# Patient Record
Sex: FEMALE | Race: WHITE | Employment: OTHER | URBAN - METROPOLITAN AREA
[De-identification: names, ages, dates, MRNs, and addresses within clinical notes are randomized per-mention and may not be internally consistent; named-entity substitution may affect disease eponyms.]

---

## 2019-05-08 ENCOUNTER — HOSPITAL ENCOUNTER (INPATIENT)
Age: 69
LOS: 7 days | Discharge: HOME HEALTH | DRG: 330 | End: 2019-05-15
Attending: EMERGENCY MEDICINE | Admitting: FAMILY MEDICINE
Payer: MEDICARE

## 2019-05-08 ENCOUNTER — APPOINTMENT (OUTPATIENT)
Dept: CT IMAGING | Age: 69
DRG: 330 | End: 2019-05-08
Payer: MEDICARE

## 2019-05-08 ENCOUNTER — APPOINTMENT (OUTPATIENT)
Dept: GENERAL RADIOLOGY | Age: 69
DRG: 330 | End: 2019-05-08
Payer: MEDICARE

## 2019-05-08 DIAGNOSIS — K56.609 LARGE BOWEL OBSTRUCTION (HCC): Primary | ICD-10-CM

## 2019-05-08 PROBLEM — R79.89 ELEVATED LACTIC ACID LEVEL: Status: ACTIVE | Noted: 2019-05-08

## 2019-05-08 LAB
ALBUMIN SERPL-MCNC: 4.2 GM/DL (ref 3.4–5)
ALP BLD-CCNC: 77 IU/L (ref 40–129)
ALT SERPL-CCNC: 17 U/L (ref 10–40)
ANION GAP SERPL CALCULATED.3IONS-SCNC: 15 MMOL/L (ref 4–16)
AST SERPL-CCNC: 17 IU/L (ref 15–37)
BASOPHILS ABSOLUTE: 0 K/CU MM
BASOPHILS RELATIVE PERCENT: 0.4 % (ref 0–1)
BILIRUB SERPL-MCNC: 0.5 MG/DL (ref 0–1)
BUN BLDV-MCNC: 10 MG/DL (ref 6–23)
CALCIUM SERPL-MCNC: 10.1 MG/DL (ref 8.3–10.6)
CHLORIDE BLD-SCNC: 101 MMOL/L (ref 99–110)
CO2: 22 MMOL/L (ref 21–32)
CREAT SERPL-MCNC: 0.7 MG/DL (ref 0.6–1.1)
DIFFERENTIAL TYPE: ABNORMAL
EOSINOPHILS ABSOLUTE: 0 K/CU MM
EOSINOPHILS RELATIVE PERCENT: 0.3 % (ref 0–3)
GFR AFRICAN AMERICAN: >60 ML/MIN/1.73M2
GFR NON-AFRICAN AMERICAN: >60 ML/MIN/1.73M2
GLUCOSE BLD-MCNC: 129 MG/DL (ref 70–99)
HCT VFR BLD CALC: 45.6 % (ref 37–47)
HEMOGLOBIN: 13.6 GM/DL (ref 12.5–16)
IMMATURE NEUTROPHIL %: 0.4 % (ref 0–0.43)
LACTATE: 2 MMOL/L (ref 0.4–2)
LACTATE: ABNORMAL MMOL/L (ref 0.4–2)
LACTIC ACID, SEPSIS: 0.9 MMOL/L (ref 0.5–1.9)
LIPASE: 27 IU/L (ref 13–60)
LYMPHOCYTES ABSOLUTE: 1.8 K/CU MM
LYMPHOCYTES RELATIVE PERCENT: 15.8 % (ref 24–44)
MCH RBC QN AUTO: 25.8 PG (ref 27–31)
MCHC RBC AUTO-ENTMCNC: 29.8 % (ref 32–36)
MCV RBC AUTO: 86.5 FL (ref 78–100)
MONOCYTES ABSOLUTE: 0.5 K/CU MM
MONOCYTES RELATIVE PERCENT: 4.2 % (ref 0–4)
NUCLEATED RBC %: 0 %
PDW BLD-RTO: 15.7 % (ref 11.7–14.9)
PLATELET # BLD: 475 K/CU MM (ref 140–440)
PMV BLD AUTO: 9.5 FL (ref 7.5–11.1)
POTASSIUM SERPL-SCNC: 4 MMOL/L (ref 3.5–5.1)
RBC # BLD: 5.27 M/CU MM (ref 4.2–5.4)
SEGMENTED NEUTROPHILS ABSOLUTE COUNT: 8.9 K/CU MM
SEGMENTED NEUTROPHILS RELATIVE PERCENT: 78.9 % (ref 36–66)
SODIUM BLD-SCNC: 138 MMOL/L (ref 135–145)
TOTAL IMMATURE NEUTOROPHIL: 0.04 K/CU MM
TOTAL NUCLEATED RBC: 0 K/CU MM
TOTAL PROTEIN: 8.1 GM/DL (ref 6.4–8.2)
WBC # BLD: 11.3 K/CU MM (ref 4–10.5)

## 2019-05-08 PROCEDURE — 80053 COMPREHEN METABOLIC PANEL: CPT

## 2019-05-08 PROCEDURE — 85025 COMPLETE CBC W/AUTO DIFF WBC: CPT

## 2019-05-08 PROCEDURE — 74177 CT ABD & PELVIS W/CONTRAST: CPT

## 2019-05-08 PROCEDURE — 96375 TX/PRO/DX INJ NEW DRUG ADDON: CPT

## 2019-05-08 PROCEDURE — 93005 ELECTROCARDIOGRAM TRACING: CPT | Performed by: EMERGENCY MEDICINE

## 2019-05-08 PROCEDURE — 6370000000 HC RX 637 (ALT 250 FOR IP): Performed by: NURSE PRACTITIONER

## 2019-05-08 PROCEDURE — 1200000000 HC SEMI PRIVATE

## 2019-05-08 PROCEDURE — 6360000002 HC RX W HCPCS: Performed by: SURGERY

## 2019-05-08 PROCEDURE — 74018 RADEX ABDOMEN 1 VIEW: CPT

## 2019-05-08 PROCEDURE — 6360000004 HC RX CONTRAST MEDICATION: Performed by: PHYSICIAN ASSISTANT

## 2019-05-08 PROCEDURE — 94761 N-INVAS EAR/PLS OXIMETRY MLT: CPT

## 2019-05-08 PROCEDURE — 83690 ASSAY OF LIPASE: CPT

## 2019-05-08 PROCEDURE — 2580000003 HC RX 258: Performed by: NURSE PRACTITIONER

## 2019-05-08 PROCEDURE — 2580000003 HC RX 258: Performed by: PHYSICIAN ASSISTANT

## 2019-05-08 PROCEDURE — 2580000003 HC RX 258: Performed by: SURGERY

## 2019-05-08 PROCEDURE — 96376 TX/PRO/DX INJ SAME DRUG ADON: CPT

## 2019-05-08 PROCEDURE — 36415 COLL VENOUS BLD VENIPUNCTURE: CPT

## 2019-05-08 PROCEDURE — 83605 ASSAY OF LACTIC ACID: CPT

## 2019-05-08 PROCEDURE — 6360000002 HC RX W HCPCS: Performed by: PHYSICIAN ASSISTANT

## 2019-05-08 PROCEDURE — 96365 THER/PROPH/DIAG IV INF INIT: CPT

## 2019-05-08 PROCEDURE — 99285 EMERGENCY DEPT VISIT HI MDM: CPT

## 2019-05-08 PROCEDURE — 96361 HYDRATE IV INFUSION ADD-ON: CPT

## 2019-05-08 RX ORDER — KETOROLAC TROMETHAMINE 30 MG/ML
15 INJECTION, SOLUTION INTRAMUSCULAR; INTRAVENOUS EVERY 6 HOURS PRN
Status: DISPENSED | OUTPATIENT
Start: 2019-05-08 | End: 2019-05-13

## 2019-05-08 RX ORDER — HYDROCODONE BITARTRATE AND ACETAMINOPHEN 5; 325 MG/1; MG/1
1 TABLET ORAL EVERY 6 HOURS PRN
Status: ON HOLD | COMMUNITY
End: 2019-05-15 | Stop reason: HOSPADM

## 2019-05-08 RX ORDER — SODIUM CHLORIDE 0.9 % (FLUSH) 0.9 %
10 SYRINGE (ML) INJECTION PRN
Status: DISCONTINUED | OUTPATIENT
Start: 2019-05-08 | End: 2019-05-15 | Stop reason: HOSPADM

## 2019-05-08 RX ORDER — ATORVASTATIN CALCIUM 10 MG/1
10 TABLET, FILM COATED ORAL DAILY
COMMUNITY

## 2019-05-08 RX ORDER — 0.9 % SODIUM CHLORIDE 0.9 %
1000 INTRAVENOUS SOLUTION INTRAVENOUS ONCE
Status: COMPLETED | OUTPATIENT
Start: 2019-05-08 | End: 2019-05-08

## 2019-05-08 RX ORDER — SODIUM CHLORIDE 9 MG/ML
INJECTION, SOLUTION INTRAVENOUS CONTINUOUS
Status: DISCONTINUED | OUTPATIENT
Start: 2019-05-08 | End: 2019-05-09

## 2019-05-08 RX ORDER — FAMOTIDINE 20 MG/1
20 TABLET, FILM COATED ORAL 2 TIMES DAILY
Status: DISCONTINUED | OUTPATIENT
Start: 2019-05-08 | End: 2019-05-09

## 2019-05-08 RX ORDER — ONDANSETRON 2 MG/ML
4 INJECTION INTRAMUSCULAR; INTRAVENOUS EVERY 6 HOURS PRN
Status: DISCONTINUED | OUTPATIENT
Start: 2019-05-08 | End: 2019-05-15 | Stop reason: HOSPADM

## 2019-05-08 RX ORDER — ROPINIROLE 1 MG/1
2 TABLET, FILM COATED ORAL 2 TIMES DAILY
Status: DISCONTINUED | OUTPATIENT
Start: 2019-05-08 | End: 2019-05-15 | Stop reason: HOSPADM

## 2019-05-08 RX ORDER — OXYMETAZOLINE HYDROCHLORIDE 0.05 G/100ML
2 SPRAY NASAL ONCE
Status: ACTIVE | OUTPATIENT
Start: 2019-05-08 | End: 2019-05-11

## 2019-05-08 RX ORDER — LIDOCAINE HYDROCHLORIDE 20 MG/ML
JELLY TOPICAL ONCE
Status: DISCONTINUED | OUTPATIENT
Start: 2019-05-08 | End: 2019-05-15 | Stop reason: HOSPADM

## 2019-05-08 RX ORDER — ROPINIROLE 2 MG/1
2 TABLET, FILM COATED ORAL 2 TIMES DAILY
COMMUNITY
End: 2021-12-17

## 2019-05-08 RX ORDER — ONDANSETRON 2 MG/ML
4 INJECTION INTRAMUSCULAR; INTRAVENOUS EVERY 30 MIN PRN
Status: DISCONTINUED | OUTPATIENT
Start: 2019-05-08 | End: 2019-05-13

## 2019-05-08 RX ORDER — SODIUM CHLORIDE 0.9 % (FLUSH) 0.9 %
10 SYRINGE (ML) INJECTION EVERY 12 HOURS SCHEDULED
Status: DISCONTINUED | OUTPATIENT
Start: 2019-05-08 | End: 2019-05-15 | Stop reason: HOSPADM

## 2019-05-08 RX ORDER — MORPHINE SULFATE 4 MG/ML
4 INJECTION, SOLUTION INTRAMUSCULAR; INTRAVENOUS EVERY 30 MIN PRN
Status: DISCONTINUED | OUTPATIENT
Start: 2019-05-08 | End: 2019-05-09

## 2019-05-08 RX ORDER — AMOXICILLIN 875 MG/1
875 TABLET, COATED ORAL 2 TIMES DAILY
Status: ON HOLD | COMMUNITY
End: 2019-05-15 | Stop reason: HOSPADM

## 2019-05-08 RX ORDER — PROMETHAZINE HYDROCHLORIDE 25 MG/1
12.5 TABLET ORAL EVERY 6 HOURS PRN
Status: DISCONTINUED | OUTPATIENT
Start: 2019-05-08 | End: 2019-05-09

## 2019-05-08 RX ADMIN — PIPERACILLIN SODIUM AND TAZOBACTAM SODIUM 3.38 G: 3; .375 INJECTION, POWDER, FOR SOLUTION INTRAVENOUS at 18:29

## 2019-05-08 RX ADMIN — FAMOTIDINE 20 MG: 20 TABLET ORAL at 23:27

## 2019-05-08 RX ADMIN — PIPERACILLIN SODIUM AND TAZOBACTAM SODIUM 3.38 G: 3; .375 INJECTION, POWDER, FOR SOLUTION INTRAVENOUS at 23:26

## 2019-05-08 RX ADMIN — MORPHINE SULFATE 4 MG: 4 INJECTION INTRAVENOUS at 14:02

## 2019-05-08 RX ADMIN — MORPHINE SULFATE 4 MG: 4 INJECTION INTRAVENOUS at 18:20

## 2019-05-08 RX ADMIN — SODIUM CHLORIDE, PRESERVATIVE FREE 10 ML: 5 INJECTION INTRAVENOUS at 14:57

## 2019-05-08 RX ADMIN — ONDANSETRON 4 MG: 2 INJECTION INTRAMUSCULAR; INTRAVENOUS at 14:02

## 2019-05-08 RX ADMIN — SODIUM CHLORIDE, PRESERVATIVE FREE 10 ML: 5 INJECTION INTRAVENOUS at 23:27

## 2019-05-08 RX ADMIN — SODIUM CHLORIDE: 9 INJECTION, SOLUTION INTRAVENOUS at 23:26

## 2019-05-08 RX ADMIN — SODIUM CHLORIDE 1000 ML: 9 INJECTION, SOLUTION INTRAVENOUS at 14:02

## 2019-05-08 RX ADMIN — ONDANSETRON 4 MG: 2 INJECTION INTRAMUSCULAR; INTRAVENOUS at 18:20

## 2019-05-08 RX ADMIN — IOPAMIDOL 75 ML: 755 INJECTION, SOLUTION INTRAVENOUS at 14:57

## 2019-05-08 ASSESSMENT — PAIN SCALES - GENERAL
PAINLEVEL_OUTOF10: 8
PAINLEVEL_OUTOF10: 5
PAINLEVEL_OUTOF10: 0

## 2019-05-08 ASSESSMENT — PAIN DESCRIPTION - PAIN TYPE
TYPE: ACUTE PAIN
TYPE: ACUTE PAIN

## 2019-05-08 ASSESSMENT — PAIN DESCRIPTION - LOCATION
LOCATION: ABDOMEN
LOCATION: ABDOMEN

## 2019-05-08 ASSESSMENT — PAIN DESCRIPTION - DESCRIPTORS: DESCRIPTORS: SORE;PRESSURE

## 2019-05-08 ASSESSMENT — PAIN DESCRIPTION - ORIENTATION: ORIENTATION: MID;LOWER

## 2019-05-08 ASSESSMENT — PAIN DESCRIPTION - FREQUENCY: FREQUENCY: INTERMITTENT

## 2019-05-08 NOTE — ED PROVIDER NOTES
Patient Identification  Fam Scott is a 80 y.o. female    Chief Complaint  Abdominal Pain (patient states she is having a diverticulitis flare; NV)      HPI  (History provided by patient)  This is a 80 y.o. female who was brought in by self for chief complaint of abdominal pain, NV. Onset was 3 months ago. Pain is located from the ribs to the vagina throughout the abdomen. She describes it as constant since onset. States it is severe, worse over the last few days. She has a history of diverticulitis and states this feels similar. She has been diagnosed with diverticulitis twice in the last 2 months, states that symptoms never resolved, she has been on Cipro and Flagyl, also completed course of Augmentin without relief. She has never seen a surgeon for this. She notes recurrent issues of with diverticulitis for several years. Reports subjective fevers and chills at home but no documented elevated temperature. She reports vomiting but no changes in bowel movements, last bowel movement was 4 AM today, no bloody stools. REVIEW OF SYSTEMS    Constitutional:  + subjective fever, chills  HENT:  Denies sore throat or ear pain   Eyes: Denies vision changes, eye pain  Cardiovascular:  Denies chest pain, syncope  Respiratory:  Denies shortness of breath, cough   GI:  + abdominal pain, nausea, vomiting  :  Denies dysuria, discharge  Musculoskeletal:  Denies back pain, joint pain  Skin:  Denies rash, pruritis  Neurologic:  Denies headache, focal weakness, or sensory changes     See HPI and nursing notes for additional information     I have reviewed the following nursing documentation:  Allergies: No Known Allergies    Past medical history:  has a past medical history of Diverticulitis, Endometriosis, and Restless leg syndrome. Past surgical history:  has no past surgical history on file.     Home medications:   Prior to Admission medications    Not on File       Social history:  reports that she has quit smoking. She has never used smokeless tobacco. She reports that she does not use drugs. Family history:    Family History   Problem Relation Age of Onset    Dementia Mother     Cancer Father     Lung Cancer Sister     Colon Cancer Sister          Exam  /84   Pulse 96   Temp 97.5 °F (36.4 °C) (Oral)   Resp 16   Ht 5' 9\" (1.753 m)   Wt 192 lb (87.1 kg)   SpO2 99%   BMI 28.35 kg/m²   Nursing note and vitals reviewed. Constitutional: Well developed, well nourished. No acute distress. HENT:      Head: Normocephalic and atraumatic. Ears: External ears normal.      Nose: Nose normal.     Mouth: Membrane mucosa moist and pink. No posterior oropharynx erythema or tonsillar edema  Eyes: Anicteric sclera. No discharge, PERRL  Neck: Supple. Trachea midline. Cardiovascular: RRR, no murmurs, rubs, or gallops, radial pulses 2+ bilaterally. Pulmonary/Chest: Effort normal. No respiratory distress. CTAB. No stridor. No wheezes. No rales. Abdominal: Soft. TTP in suprapubic region, less so in LLQ. No distension. No guarding, rebound tenderness, or evidence of ascites. : No CVA tenderness. Musculoskeletal: Moves all extremities. No gross deformity. Neurological: Alert and oriented to person, place, and time. Normal muscle tone. Skin: Warm and dry. No rash. Psychiatric: Normal mood and affect. Behavior is normal.      Radiographs (if obtained):  [] The following radiograph was interpreted by myself in the absence of a radiologist:   [x] Radiologist's Report Reviewed:  CT ABDOMEN PELVIS W IV CONTRAST   Final Result   Findings compatible with distal large bowel obstruction with transition in   proximal sigmoid colon and upstream dilatation of remainder of large bowel as   well as of small bowel loops to the level of the proximal jejunum. More   distal sigmoid colon is collapsed.   Underlying wall thickening not excluded   which could reflect nonspecific infectious or inflammatory colitis. Underlying mass lesion not excluded. Recommend continued close follow-up and   consideration for direct visualization. Superimposed mild colonic   diverticulosis but without a particularly inflamed diverticulum to suggest   diverticulitis. Prominent appendix without periappendiceal inflammatory change or wall   thickening, felt unlikely to represent acute appendicitis. Mild ascites, likely reactive. Large hiatal hernia. Indeterminate low-attenuation right renal lesion measuring 1.7 x 1.6 cm. Recommend further evaluation with renal mass protocol MRI or CT on a   nonemergent basis.                 Labs  Results for orders placed or performed during the hospital encounter of 05/08/19   CBC auto diff   Result Value Ref Range    WBC 11.3 (H) 4.0 - 10.5 K/CU MM    RBC 5.27 4.2 - 5.4 M/CU MM    Hemoglobin 13.6 12.5 - 16.0 GM/DL    Hematocrit 45.6 37 - 47 %    MCV 86.5 78 - 100 FL    MCH 25.8 (L) 27 - 31 PG    MCHC 29.8 (L) 32.0 - 36.0 %    RDW 15.7 (H) 11.7 - 14.9 %    Platelets 258 (H) 307 - 440 K/CU MM    MPV 9.5 7.5 - 11.1 FL    Differential Type AUTOMATED DIFFERENTIAL     Segs Relative 78.9 (H) 36 - 66 %    Lymphocytes % 15.8 (L) 24 - 44 %    Monocytes % 4.2 (H) 0 - 4 %    Eosinophils % 0.3 0 - 3 %    Basophils % 0.4 0 - 1 %    Segs Absolute 8.9 K/CU MM    Lymphocytes # 1.8 K/CU MM    Monocytes # 0.5 K/CU MM    Eosinophils # 0.0 K/CU MM    Basophils # 0.0 K/CU MM    Nucleated RBC % 0.0 %    Total Nucleated RBC 0.0 K/CU MM    Total Immature Neutrophil 0.04 K/CU MM    Immature Neutrophil % 0.4 0 - 0.43 %   CMP   Result Value Ref Range    Sodium 138 135 - 145 MMOL/L    Potassium 4.0 3.5 - 5.1 MMOL/L    Chloride 101 99 - 110 mMol/L    CO2 22 21 - 32 MMOL/L    BUN 10 6 - 23 MG/DL    CREATININE 0.7 0.6 - 1.1 MG/DL    Glucose 129 (H) 70 - 99 MG/DL    Calcium 10.1 8.3 - 10.6 MG/DL    Alb 4.2 3.4 - 5.0 GM/DL    Total Protein 8.1 6.4 - 8.2 GM/DL    Total Bilirubin 0.5 0.0 - 1.0 MG/DL    ALT 17 10 - 40 U/L    AST 17 15 - 37 IU/L    Alkaline Phosphatase 77 40 - 129 IU/L    GFR Non-African American >60 >60 mL/min/1.73m2    GFR African American >60 >60 mL/min/1.73m2    Anion Gap 15 4 - 16   Lipase   Result Value Ref Range    Lipase 27 13 - 60 IU/L   Lactic Acid, Plasma   Result Value Ref Range    Lactate (HH) 0.4 - 2.0 mMOL/L     2.4  LACT CALLED TO BELA BHARDWAJ ON 05/08/2019 @ 1419 BY SHEN GALVEZ   RESULTS READ BACK     Lactic Acid, Plasma   Result Value Ref Range    Lactate 2.0 0.4 - 2.0 mMOL/L   EKG 12 Lead   Result Value Ref Range    Ventricular Rate 87 BPM    Atrial Rate 87 BPM    P-R Interval 142 ms    QRS Duration 74 ms    Q-T Interval 370 ms    QTc Calculation (Bazett) 445 ms    P Axis 36 degrees    R Axis 54 degrees    T Axis 50 degrees    Diagnosis       Sinus rhythm with fusion complexes  Otherwise normal ECG  No previous ECGs available           MDM  Patient presents for abdominal pain, vomiting. She reports she's been having difficulty passing flatulence. She is concerned may be due to diverticulitis and has been on multiple antibiotics recently for this. Found to have a large bowel obstruction area initial lactic acid was slightly elevated, repeat after fluids is normal.  NG tube placed. Pain is improved with morphine and Zofran. Discussed all results with patient. Discussed that I will need to contact his general surgeon regarding her symptoms. Consult was placed to Dr. Denisse Briggs at patient's request, discussed history and physical examination and imaging, he will assess today, likely surgery tomorrow afternoon. Consult placed to LORENZO Packer who will admit. This patient was also seen and evaluated by Dr. Drew Johnson. Final Impression  1. Large bowel obstruction (HCC)        Blood pressure 121/84, pulse 96, temperature 97.5 °F (36.4 °C), temperature source Oral, resp. rate 16, height 5' 9\" (1.753 m), weight 192 lb (87.1 kg), SpO2 99 %.      Disposition:  Admit to med/surg floor in stable condition. Patient was given scripts for the following medications. I counseled patient how to take these medications. New Prescriptions    No medications on file       This chart was generated using the [address] [place] [address] [address]  dictation system. I created this record but it may contain dictation errors given the limitations of this technology.        Marcel Mcghee PA-C  05/08/19 9602

## 2019-05-08 NOTE — ED PROVIDER NOTES
I independently examined and evaluated Angela Meek. In brief their history revealed a 80 y.o. female who was brought in by self for chief complaint of abdominal pain, NV. Onset was 3 months ago. Pain is located from the ribs to the vagina throughout the abdomen. She describes it as constant since onset. States it is severe, worse over the last few days. She has a history of diverticulitis and states this feels similar. She has been diagnosed with diverticulitis twice in the last 2 months, states that symptoms never resolved, she has been on Cipro and Flagyl, also completed course of Augmentin without relief. She has never seen a surgeon for this. She notes recurrent issues of with diverticulitis for several years. Reports subjective fevers and chills at home but no documented elevated temperature. She reports vomiting but no changes in bowel movements, last bowel movement was 4 AM today, no bloody stools.     Their focused exam revealed alert and oriented female resting in bed in no distress normocephalic atraumatic sclerae clear airway normal lungs clear heart regular rhythm 2 pulse pulses throughout abdomen soft tender diffusely no rebound or guarding or rigidity. Sounds decreased. 5/5 strength throughout skin has no rash or swelling cranial nerves intact     ED course: Patient seen with PA please see his note. Patient here with abdominal pain nausea vomiting constipation found to have a large bowel obstruction general surgery consulted, NG tube inserted patient admitted for observation given pain medicine nausea medicine. 12 lead EKG per my interpretation:  Normal Sinus Rhythm 86  Axis is   Normal  QTc is  440  There is no specific T wave changes appreciated. There is no specific ST wave changes appreciated.     Prior EKG to compare with was not available       All diagnostic, treatment, and disposition decisions were made by myself in conjunction with the [facility]

## 2019-05-08 NOTE — H&P
History and Physical  FABIEN Ram-BC   Internal Medicine Hospitalist        Name:  Marsa Peabody /Age/Sex: 1950  (80 y.o. female)   MRN & CSN:  [id number] & [id number] Admission Date/Time: 2019  1:19 PM   Location:  [facility]01 PCP: No primary care provider on file. Hospital Day: 1      Supervising Physician: Dr. Abdelrahman Beasley    Chief Complaint: Abdominal Pain (patient states she is having a diverticulitis flare; NV)     Assessment and Plan:   Marsa Peabody is a 80 y.o.  female who presents with Large bowel obstruction ([address]     Acute Large Bowel obstruction - could be from adhesions, history of hemorrhoids surgery in . No recent colonoscopy or EGD, CT abdomen findings compatible with distal large bowel obstruction. - admit inpatient, telemetry monitoring           - Consult Gen surgery, Dr. Ryan Sue, possible surgery tomorrow afternoon         - ABx: Zosyn per Dr. Ryan Sue         - strict NPO effective now         - IVF NS         - NG tube placed in ED         - pain control: PRN toradol         - prn Zofran         - check lab works in AM     Elevated lactic acid level - could be 2/2 fluid depletion, lactate 2.4         - cont IVF hydration         - series lactate     Chronic Illnesses: will continue current home medications unless contraindicated by above plan and assessment. - Diverticulitis         - Restless leg syndrome     Current diagnosis and plan of management discussed with the patient at the time of admission in lay language who agree(s) to the above plan and disposition of admission for further care. All concerns and questions addressed. Patient assessment and plan in conjunction with supervising physician - Dr. Alfred Seo Effective Now    DVT Prophylaxis [] Lovenox, []  Heparin, [x] SCDs, [] Ambulation  [] Long term AC  Hold Lovenox for now d/t possible surgery tomorrow afternoon.    GI Prophylaxis [x] PPI,  [] father; Genette Zurdo in her sister; Dementia in her mother; Corinne Stevens in her sister. Soc HX:   Social History     Socioeconomic History    Marital status:      Spouse name: None    Number of children: None    Years of education: None    Highest education level: None   Occupational History    None   Social Needs    Financial resource strain: None    Food insecurity:     Worry: None     Inability: None    Transportation needs:     Medical: None     Non-medical: None   Tobacco Use    Smoking status: Former Smoker    Smokeless tobacco: Never Used   Substance and Sexual Activity    Alcohol use: None    Drug use: Never    Sexual activity: None   Lifestyle    Physical activity:     Days per week: None     Minutes per session: None    Stress: None   Relationships    Social connections:     Talks on phone: None     Gets together: None     Attends Synagogue service: None     Active member of club or organization: None     Attends meetings of clubs or organizations: None     Relationship status: None    Intimate partner violence:     Fear of current or ex partner: None     Emotionally abused: None     Physically abused: None     Forced sexual activity: None   Other Topics Concern    None   Social History Narrative    None     TOBACCO:   reports that she has quit smoking. She has never used smokeless tobacco.  ETOH:   has no alcohol history on file. Drugs:  reports that she does not use drugs.     Allergies: No Known Allergies  Medications:   Medications:    oxymetazoline  2 spray Nasal Once    lidocaine   Topical Once    sodium chloride flush  10 mL Intravenous 2 times per day    famotidine  20 mg Oral BID    piperacillin-tazobactam  3.375 g Intravenous Once    [START ON 5/9/2019] piperacillin-tazobactam  3.375 g Intravenous Q8H      Infusions:   PRN Meds:     morphine 4 mg Q30 Min PRN   ondansetron 4 mg Q30 Min PRN   sodium chloride flush 10 mL PRN   sodium chloride flush 10 mL PRN magnesium hydroxide 30 mL Daily PRN   ondansetron 4 mg Q6H PRN   ketorolac 15 mg Q6H PRN     Prior to Admission Meds:  Prior to Admission medications    Medication Sig Start Date End Date Taking? Authorizing Provider   HYDROcodone-acetaminophen (NORCO) 5-325 MG per tablet Take 1 tablet by mouth every 6 hours as needed for Pain. Yes Historical Provider, MD   rOPINIRole (REQUIP) 2 MG tablet Take 2 mg by mouth 2 times daily   Yes Historical Provider, MD   amoxicillin (AMOXIL) 875 MG tablet Take 875 mg by mouth 2 times daily   Yes Historical Provider, MD   atorvastatin (LIPITOR) 10 MG tablet Take 10 mg by mouth daily   Yes Historical Provider, MD   triamcinolone (KENALOG) 0.1 % ointment Apply topically 2 times daily Apply topically 2 times daily. Yes Historical Provider, MD     Data:     Laboratory this visit:  Reviewed  Recent Labs     05/08/19  1230   WBC 11.3*   HGB 13.6   HCT 45.6   *      Recent Labs     05/08/19  1230      K 4.0      CO2 22   BUN 10   CREATININE 0.7     Recent Labs     05/08/19  1230   AST 17   ALT 17   BILITOT 0.5   ALKPHOS 77     No results for input(s): INR in the last 72 hours. No results for input(s): CKTOTAL, CKMB, CKMBINDEX in the last 72 hours. Invalid input(s): Nikkie Duran input(s): PRO-BNP    Radiology this visit:  Reviewed. Ct Abdomen Pelvis W Iv Contrast    Result Date: 5/8/2019  EXAMINATION: CT OF THE ABDOMEN AND PELVIS WITH CONTRAST 5/8/2019 2:52 pm TECHNIQUE: CT of the abdomen and pelvis was performed with the administration of intravenous contrast. Multiplanar reformatted images are provided for review. Dose modulation, iterative reconstruction, and/or weight based adjustment of the mA/kV was utilized to reduce the radiation dose to as low as reasonably achievable. COMPARISON: None HISTORY: ORDERING SYSTEM PROVIDED HISTORY: ABDOMINAL PAIN TECHNOLOGIST PROVIDED HISTORY: IV contrast only. Thank you.  Ordering Physician Provided Reason for Exam: Abdominal pain; Diverticulitis Acuity: Unknown Type of Exam: Unknown Additional signs and symptoms: PT STATES PAIN X A MONTH Relevant Medical/Surgical History: 75 ML [address]OVUE 370 FINDINGS: Lower Chest: Visualized lung bases appear clear. No pleural effusions. Large hiatal hernia. Organs: Benign liver cyst versus hemangioma to the left lobe of the liver measuring 8 mm in size. No follow-up imaging recommended. Liver otherwise appears unremarkable. Spleen, pancreas, gallbladder and adrenal glands are unremarkable. Bilobed low-attenuation partially exophytic focus to the upper lobe of the left kidney with attenuation characteristics in the range of fluid compatible with benign renal cyst measuring 2.0 x 1.0 cm. There are also two smaller low-attenuation foci noted to the lower pole of the left kidney which are too small to further characterize but compatible with benign renal cysts as well. No follow up imaging of any of these foci recommended. Left kidney is otherwise unremarkable. There is a low-attenuation focus noted to the upper pole of the right kidney with attenuation characteristics higher than fluid. This measures 1.7 x 1.6 cm. Right kidney otherwise appears unremarkable. GI/Bowel: Fluid and air-filled distention of multiple small bowel loops involving jejunum through terminal ileum. There is also fluid in air-filled distention of large bowel to level of proximal sigmoid colon. Distal to this the sigmoid colon is collapsed. Underlying wall thickening not excluded. There may be a few scattered colonic diverticula but no diverticula appeared to be particularly inflamed. Appendix is prominent measuring up to 9 mm in caliber but without periappendiceal inflammatory change or wall thickening of the appendix. Pelvis: Urinary bladder, uterus and adnexa appear unremarkable. Peritoneum/Retroperitoneum: Small amount of intraperitoneal free fluid. No focal fluid collections or intraperitoneal free air. Abdominal aorta is normal in caliber. Mild atherosclerosis. No lymphadenopathy is seen. Bones/Soft Tissues: No acute or suspicious bony or soft tissue abnormalities. Findings compatible with distal large bowel obstruction with transition in proximal sigmoid colon and upstream dilatation of remainder of large bowel as well as of small bowel loops to the level of the jejunum. More distal sigmoid colon is collapsed. Underlying wall thickening not excluded which could reflect nonspecific infectious or inflammatory colitis. Underlying mass lesion not excluded. Recommend continued close follow-up and consideration for direct visualization. Superimposed mild colonic diverticulosis but without a particularly inflamed diverticulum to suggest diverticulitis. Prominent appendix without periappendiceal inflammatory change or wall thickening, felt unlikely to represent acute appendicitis. Mild ascites, likely reactive. Large hiatal hernia. Indeterminate low-attenuation right renal lesion measuring 1.7 x 1.6 cm. Recommend further evaluation with renal mass protocol MRI or CT on a nonemergent basis. EKG this visit:   EKG: Sinus rhythm with fusion complexes, rate 87. Otherwise normal ECG. No previous ECGs available. Current Treatment Team:  Treatment Team: Attending Provider: Erika Qiu MD; Consulting Physician: Joby Higgins MD; Registered Nurse: Roque Gama LPN; Patient Care Tech: Adam Piper; Registered Nurse: Annabelle Birmingham A. Marzella Kayser, RN Sharlyne Grimes, APRN-BC   [facility]  5/8/2019 4:18 PM      Electronically signed by FABIEN Pickard CNP on 5/8/2019 at 4:18 PM

## 2019-05-09 ENCOUNTER — ANESTHESIA EVENT (OUTPATIENT)
Dept: OPERATING ROOM | Age: 69
DRG: 330 | End: 2019-05-09
Payer: MEDICARE

## 2019-05-09 ENCOUNTER — ANESTHESIA (OUTPATIENT)
Dept: OPERATING ROOM | Age: 69
DRG: 330 | End: 2019-05-09
Payer: MEDICARE

## 2019-05-09 VITALS
RESPIRATION RATE: 4 BRPM | SYSTOLIC BLOOD PRESSURE: 138 MMHG | TEMPERATURE: 98 F | OXYGEN SATURATION: 100 % | DIASTOLIC BLOOD PRESSURE: 103 MMHG

## 2019-05-09 LAB
ANION GAP SERPL CALCULATED.3IONS-SCNC: 10 MMOL/L (ref 4–16)
BACTERIA: NEGATIVE /HPF
BASOPHILS ABSOLUTE: 0 K/CU MM
BASOPHILS RELATIVE PERCENT: 0.3 % (ref 0–1)
BILIRUBIN URINE: NEGATIVE MG/DL
BLOOD, URINE: NEGATIVE
BUN BLDV-MCNC: 10 MG/DL (ref 6–23)
CALCIUM SERPL-MCNC: 9.1 MG/DL (ref 8.3–10.6)
CHLORIDE BLD-SCNC: 104 MMOL/L (ref 99–110)
CLARITY: CLEAR
CO2: 26 MMOL/L (ref 21–32)
COLOR: YELLOW
CREAT SERPL-MCNC: 0.8 MG/DL (ref 0.6–1.1)
DIFFERENTIAL TYPE: ABNORMAL
EOSINOPHILS ABSOLUTE: 0.1 K/CU MM
EOSINOPHILS RELATIVE PERCENT: 1.1 % (ref 0–3)
GFR AFRICAN AMERICAN: >60 ML/MIN/1.73M2
GFR NON-AFRICAN AMERICAN: >60 ML/MIN/1.73M2
GLUCOSE BLD-MCNC: 116 MG/DL (ref 70–99)
GLUCOSE, URINE: NEGATIVE MG/DL
HCT VFR BLD CALC: 39.6 % (ref 37–47)
HEMOGLOBIN: 11.9 GM/DL (ref 12.5–16)
IMMATURE NEUTROPHIL %: 0.3 % (ref 0–0.43)
KETONES, URINE: NEGATIVE MG/DL
LEUKOCYTE ESTERASE, URINE: NEGATIVE
LYMPHOCYTES ABSOLUTE: 1 K/CU MM
LYMPHOCYTES RELATIVE PERCENT: 11.4 % (ref 24–44)
MCH RBC QN AUTO: 26 PG (ref 27–31)
MCHC RBC AUTO-ENTMCNC: 30.1 % (ref 32–36)
MCV RBC AUTO: 86.7 FL (ref 78–100)
MONOCYTES ABSOLUTE: 0.6 K/CU MM
MONOCYTES RELATIVE PERCENT: 6.6 % (ref 0–4)
NITRITE URINE, QUANTITATIVE: NEGATIVE
NUCLEATED RBC %: 0 %
PDW BLD-RTO: 15.9 % (ref 11.7–14.9)
PH, URINE: 5 (ref 5–8)
PLATELET # BLD: 362 K/CU MM (ref 140–440)
PMV BLD AUTO: 9.3 FL (ref 7.5–11.1)
POTASSIUM SERPL-SCNC: 4 MMOL/L (ref 3.5–5.1)
PROTEIN UA: NEGATIVE MG/DL
RBC # BLD: 4.57 M/CU MM (ref 4.2–5.4)
RBC URINE: 1 /HPF (ref 0–6)
SEGMENTED NEUTROPHILS ABSOLUTE COUNT: 7.3 K/CU MM
SEGMENTED NEUTROPHILS RELATIVE PERCENT: 80.3 % (ref 36–66)
SODIUM BLD-SCNC: 140 MMOL/L (ref 135–145)
SPECIFIC GRAVITY UA: 1.04 (ref 1–1.03)
SPECIFIC GRAVITY UA: ABNORMAL (ref 1–1.03)
SQUAMOUS EPITHELIAL: 4 /HPF
TOTAL IMMATURE NEUTOROPHIL: 0.03 K/CU MM
TOTAL NUCLEATED RBC: 0 K/CU MM
TRICHOMONAS: ABNORMAL /HPF
UROBILINOGEN, URINE: NORMAL MG/DL (ref 0.2–1)
WBC # BLD: 9.1 K/CU MM (ref 4–10.5)
WBC UA: 2 /HPF (ref 0–5)

## 2019-05-09 PROCEDURE — 7100000000 HC PACU RECOVERY - FIRST 15 MIN: Performed by: SURGERY

## 2019-05-09 PROCEDURE — 0D1N0Z4 BYPASS SIGMOID COLON TO CUTANEOUS, OPEN APPROACH: ICD-10-PCS | Performed by: SURGERY

## 2019-05-09 PROCEDURE — 6360000002 HC RX W HCPCS: Performed by: SURGERY

## 2019-05-09 PROCEDURE — 81001 URINALYSIS AUTO W/SCOPE: CPT

## 2019-05-09 PROCEDURE — 2580000003 HC RX 258: Performed by: NURSE PRACTITIONER

## 2019-05-09 PROCEDURE — 3700000001 HC ADD 15 MINUTES (ANESTHESIA): Performed by: SURGERY

## 2019-05-09 PROCEDURE — 2580000003 HC RX 258: Performed by: NURSE ANESTHETIST, CERTIFIED REGISTERED

## 2019-05-09 PROCEDURE — 6360000002 HC RX W HCPCS: Performed by: NURSE ANESTHETIST, CERTIFIED REGISTERED

## 2019-05-09 PROCEDURE — 2500000003 HC RX 250 WO HCPCS: Performed by: NURSE ANESTHETIST, CERTIFIED REGISTERED

## 2019-05-09 PROCEDURE — 6360000002 HC RX W HCPCS: Performed by: NURSE PRACTITIONER

## 2019-05-09 PROCEDURE — 6360000002 HC RX W HCPCS: Performed by: PHYSICIAN ASSISTANT

## 2019-05-09 PROCEDURE — 3600000004 HC SURGERY LEVEL 4 BASE: Performed by: SURGERY

## 2019-05-09 PROCEDURE — 0DBN0ZZ EXCISION OF SIGMOID COLON, OPEN APPROACH: ICD-10-PCS | Performed by: SURGERY

## 2019-05-09 PROCEDURE — 6360000002 HC RX W HCPCS: Performed by: ANESTHESIOLOGY

## 2019-05-09 PROCEDURE — 88304 TISSUE EXAM BY PATHOLOGIST: CPT

## 2019-05-09 PROCEDURE — 2580000003 HC RX 258: Performed by: SURGERY

## 2019-05-09 PROCEDURE — 7100000001 HC PACU RECOVERY - ADDTL 15 MIN: Performed by: SURGERY

## 2019-05-09 PROCEDURE — 85025 COMPLETE CBC W/AUTO DIFF WBC: CPT

## 2019-05-09 PROCEDURE — 6370000000 HC RX 637 (ALT 250 FOR IP): Performed by: NURSE PRACTITIONER

## 2019-05-09 PROCEDURE — 2720000010 HC SURG SUPPLY STERILE: Performed by: SURGERY

## 2019-05-09 PROCEDURE — C9113 INJ PANTOPRAZOLE SODIUM, VIA: HCPCS | Performed by: SURGERY

## 2019-05-09 PROCEDURE — 3700000000 HC ANESTHESIA ATTENDED CARE: Performed by: SURGERY

## 2019-05-09 PROCEDURE — 3600000014 HC SURGERY LEVEL 4 ADDTL 15MIN: Performed by: SURGERY

## 2019-05-09 PROCEDURE — 36415 COLL VENOUS BLD VENIPUNCTURE: CPT

## 2019-05-09 PROCEDURE — 93010 ELECTROCARDIOGRAM REPORT: CPT | Performed by: INTERNAL MEDICINE

## 2019-05-09 PROCEDURE — 6370000000 HC RX 637 (ALT 250 FOR IP): Performed by: SURGERY

## 2019-05-09 PROCEDURE — 1200000000 HC SEMI PRIVATE

## 2019-05-09 PROCEDURE — 80048 BASIC METABOLIC PNL TOTAL CA: CPT

## 2019-05-09 PROCEDURE — 2580000003 HC RX 258

## 2019-05-09 PROCEDURE — 2709999900 HC NON-CHARGEABLE SUPPLY: Performed by: SURGERY

## 2019-05-09 PROCEDURE — 88307 TISSUE EXAM BY PATHOLOGIST: CPT

## 2019-05-09 PROCEDURE — 0DTJ0ZZ RESECTION OF APPENDIX, OPEN APPROACH: ICD-10-PCS | Performed by: SURGERY

## 2019-05-09 RX ORDER — PROMETHAZINE HYDROCHLORIDE 25 MG/ML
6.25 INJECTION, SOLUTION INTRAMUSCULAR; INTRAVENOUS
Status: DISCONTINUED | OUTPATIENT
Start: 2019-05-09 | End: 2019-05-09 | Stop reason: HOSPADM

## 2019-05-09 RX ORDER — PANTOPRAZOLE SODIUM 40 MG/10ML
40 INJECTION, POWDER, LYOPHILIZED, FOR SOLUTION INTRAVENOUS DAILY
Status: DISCONTINUED | OUTPATIENT
Start: 2019-05-09 | End: 2019-05-15 | Stop reason: HOSPADM

## 2019-05-09 RX ORDER — LIDOCAINE HYDROCHLORIDE 20 MG/ML
INJECTION, SOLUTION INTRAVENOUS PRN
Status: DISCONTINUED | OUTPATIENT
Start: 2019-05-09 | End: 2019-05-09 | Stop reason: SDUPTHER

## 2019-05-09 RX ORDER — LORAZEPAM 2 MG/ML
1 INJECTION INTRAMUSCULAR EVERY 6 HOURS PRN
Status: DISCONTINUED | OUTPATIENT
Start: 2019-05-09 | End: 2019-05-15 | Stop reason: HOSPADM

## 2019-05-09 RX ORDER — SODIUM CHLORIDE 9 MG/ML
INJECTION, SOLUTION INTRAVENOUS CONTINUOUS PRN
Status: DISCONTINUED | OUTPATIENT
Start: 2019-05-09 | End: 2019-05-09 | Stop reason: SDUPTHER

## 2019-05-09 RX ORDER — PROMETHAZINE HYDROCHLORIDE 25 MG/ML
12.5 INJECTION, SOLUTION INTRAMUSCULAR; INTRAVENOUS EVERY 6 HOURS PRN
Status: DISCONTINUED | OUTPATIENT
Start: 2019-05-09 | End: 2019-05-15 | Stop reason: HOSPADM

## 2019-05-09 RX ORDER — HYDROMORPHONE HCL 110MG/55ML
PATIENT CONTROLLED ANALGESIA SYRINGE INTRAVENOUS PRN
Status: DISCONTINUED | OUTPATIENT
Start: 2019-05-09 | End: 2019-05-09 | Stop reason: SDUPTHER

## 2019-05-09 RX ORDER — DEXAMETHASONE SODIUM PHOSPHATE 4 MG/ML
INJECTION, SOLUTION INTRA-ARTICULAR; INTRALESIONAL; INTRAMUSCULAR; INTRAVENOUS; SOFT TISSUE PRN
Status: DISCONTINUED | OUTPATIENT
Start: 2019-05-09 | End: 2019-05-09 | Stop reason: SDUPTHER

## 2019-05-09 RX ORDER — ACETAMINOPHEN 10 MG/ML
1000 INJECTION, SOLUTION INTRAVENOUS ONCE
Status: COMPLETED | OUTPATIENT
Start: 2019-05-09 | End: 2019-05-09

## 2019-05-09 RX ORDER — NALOXONE HYDROCHLORIDE 0.4 MG/ML
0.4 INJECTION, SOLUTION INTRAMUSCULAR; INTRAVENOUS; SUBCUTANEOUS PRN
Status: DISCONTINUED | OUTPATIENT
Start: 2019-05-09 | End: 2019-05-14

## 2019-05-09 RX ORDER — DIPHENHYDRAMINE HYDROCHLORIDE 50 MG/ML
25 INJECTION INTRAMUSCULAR; INTRAVENOUS EVERY 6 HOURS PRN
Status: DISCONTINUED | OUTPATIENT
Start: 2019-05-09 | End: 2019-05-14

## 2019-05-09 RX ORDER — SODIUM CHLORIDE 9 MG/ML
INJECTION, SOLUTION INTRAVENOUS CONTINUOUS
Status: DISCONTINUED | OUTPATIENT
Start: 2019-05-09 | End: 2019-05-13

## 2019-05-09 RX ORDER — HYDRALAZINE HYDROCHLORIDE 20 MG/ML
5 INJECTION INTRAMUSCULAR; INTRAVENOUS EVERY 10 MIN PRN
Status: DISCONTINUED | OUTPATIENT
Start: 2019-05-09 | End: 2019-05-09 | Stop reason: HOSPADM

## 2019-05-09 RX ORDER — HYDROMORPHONE HCL 110MG/55ML
0.25 PATIENT CONTROLLED ANALGESIA SYRINGE INTRAVENOUS EVERY 5 MIN PRN
Status: DISCONTINUED | OUTPATIENT
Start: 2019-05-09 | End: 2019-05-09 | Stop reason: HOSPADM

## 2019-05-09 RX ORDER — ONDANSETRON 2 MG/ML
INJECTION INTRAMUSCULAR; INTRAVENOUS PRN
Status: DISCONTINUED | OUTPATIENT
Start: 2019-05-09 | End: 2019-05-09 | Stop reason: SDUPTHER

## 2019-05-09 RX ORDER — FENTANYL CITRATE 50 UG/ML
INJECTION, SOLUTION INTRAMUSCULAR; INTRAVENOUS PRN
Status: DISCONTINUED | OUTPATIENT
Start: 2019-05-09 | End: 2019-05-09 | Stop reason: SDUPTHER

## 2019-05-09 RX ORDER — ROCURONIUM BROMIDE 10 MG/ML
INJECTION, SOLUTION INTRAVENOUS PRN
Status: DISCONTINUED | OUTPATIENT
Start: 2019-05-09 | End: 2019-05-09 | Stop reason: SDUPTHER

## 2019-05-09 RX ORDER — FENTANYL CITRATE 50 UG/ML
25 INJECTION, SOLUTION INTRAMUSCULAR; INTRAVENOUS EVERY 5 MIN PRN
Status: DISCONTINUED | OUTPATIENT
Start: 2019-05-09 | End: 2019-05-09 | Stop reason: HOSPADM

## 2019-05-09 RX ORDER — HYDROMORPHONE HCL 110MG/55ML
0.5 PATIENT CONTROLLED ANALGESIA SYRINGE INTRAVENOUS EVERY 5 MIN PRN
Status: DISCONTINUED | OUTPATIENT
Start: 2019-05-09 | End: 2019-05-09 | Stop reason: HOSPADM

## 2019-05-09 RX ORDER — PROPOFOL 10 MG/ML
INJECTION, EMULSION INTRAVENOUS PRN
Status: DISCONTINUED | OUTPATIENT
Start: 2019-05-09 | End: 2019-05-09 | Stop reason: SDUPTHER

## 2019-05-09 RX ORDER — SODIUM CHLORIDE 9 MG/ML
INJECTION, SOLUTION INTRAVENOUS
Status: COMPLETED
Start: 2019-05-09 | End: 2019-05-09

## 2019-05-09 RX ORDER — MORPHINE SULFATE 2 MG/ML
2 INJECTION, SOLUTION INTRAMUSCULAR; INTRAVENOUS EVERY 5 MIN PRN
Status: DISCONTINUED | OUTPATIENT
Start: 2019-05-09 | End: 2019-05-09 | Stop reason: HOSPADM

## 2019-05-09 RX ORDER — LABETALOL HYDROCHLORIDE 5 MG/ML
5 INJECTION, SOLUTION INTRAVENOUS EVERY 10 MIN PRN
Status: DISCONTINUED | OUTPATIENT
Start: 2019-05-09 | End: 2019-05-09 | Stop reason: HOSPADM

## 2019-05-09 RX ADMIN — HYDROMORPHONE HYDROCHLORIDE 1 MG: 2 INJECTION INTRAMUSCULAR; INTRAVENOUS; SUBCUTANEOUS at 18:48

## 2019-05-09 RX ADMIN — ONDANSETRON 4 MG: 2 INJECTION INTRAMUSCULAR; INTRAVENOUS at 16:15

## 2019-05-09 RX ADMIN — ROPINIROLE HYDROCHLORIDE 2 MG: 1 TABLET, FILM COATED ORAL at 22:48

## 2019-05-09 RX ADMIN — SODIUM CHLORIDE: 9 INJECTION, SOLUTION INTRAVENOUS at 16:04

## 2019-05-09 RX ADMIN — DEXAMETHASONE SODIUM PHOSPHATE 8 MG: 4 INJECTION, SOLUTION INTRAMUSCULAR; INTRAVENOUS at 16:15

## 2019-05-09 RX ADMIN — FENTANYL CITRATE 25 MCG: 50 INJECTION, SOLUTION INTRAMUSCULAR; INTRAVENOUS at 19:38

## 2019-05-09 RX ADMIN — ACETAMINOPHEN 1000 MG: 10 INJECTION, SOLUTION INTRAVENOUS at 18:44

## 2019-05-09 RX ADMIN — PHENYLEPHRINE HYDROCHLORIDE 100 MCG: 10 INJECTION INTRAVENOUS at 16:24

## 2019-05-09 RX ADMIN — FENTANYL CITRATE 50 MCG: 50 INJECTION INTRAMUSCULAR; INTRAVENOUS at 16:39

## 2019-05-09 RX ADMIN — PIPERACILLIN SODIUM,TAZOBACTAM SODIUM 3.38 G: 3; .375 INJECTION, POWDER, FOR SOLUTION INTRAVENOUS at 10:16

## 2019-05-09 RX ADMIN — PROMETHAZINE HYDROCHLORIDE 12.5 MG: 25 TABLET ORAL at 00:26

## 2019-05-09 RX ADMIN — FENTANYL CITRATE 25 MCG: 50 INJECTION INTRAMUSCULAR; INTRAVENOUS at 18:42

## 2019-05-09 RX ADMIN — SODIUM CHLORIDE: 9 INJECTION, SOLUTION INTRAVENOUS at 10:07

## 2019-05-09 RX ADMIN — MORPHINE SULFATE 4 MG: 4 INJECTION INTRAVENOUS at 00:24

## 2019-05-09 RX ADMIN — FENTANYL CITRATE 50 MCG: 50 INJECTION INTRAMUSCULAR; INTRAVENOUS at 17:04

## 2019-05-09 RX ADMIN — PIPERACILLIN SODIUM,TAZOBACTAM SODIUM 3.38 G: 3; .375 INJECTION, POWDER, FOR SOLUTION INTRAVENOUS at 02:44

## 2019-05-09 RX ADMIN — Medication 12 MG: at 22:01

## 2019-05-09 RX ADMIN — SODIUM CHLORIDE: 9 INJECTION, SOLUTION INTRAVENOUS at 17:23

## 2019-05-09 RX ADMIN — ONDANSETRON 4 MG: 2 INJECTION INTRAMUSCULAR; INTRAVENOUS at 22:48

## 2019-05-09 RX ADMIN — FENTANYL CITRATE 50 MCG: 50 INJECTION INTRAMUSCULAR; INTRAVENOUS at 17:21

## 2019-05-09 RX ADMIN — FENTANYL CITRATE 25 MCG: 50 INJECTION INTRAMUSCULAR; INTRAVENOUS at 17:44

## 2019-05-09 RX ADMIN — PANTOPRAZOLE SODIUM 40 MG: 40 INJECTION, POWDER, LYOPHILIZED, FOR SOLUTION INTRAVENOUS at 19:34

## 2019-05-09 RX ADMIN — ROCURONIUM BROMIDE 50 MG: 50 INJECTION, SOLUTION INTRAVENOUS at 16:08

## 2019-05-09 RX ADMIN — ROCURONIUM BROMIDE 10 MG: 50 INJECTION, SOLUTION INTRAVENOUS at 17:18

## 2019-05-09 RX ADMIN — ONDANSETRON 4 MG: 2 INJECTION INTRAMUSCULAR; INTRAVENOUS at 10:06

## 2019-05-09 RX ADMIN — HYDROMORPHONE HYDROCHLORIDE 1 MG: 2 INJECTION INTRAMUSCULAR; INTRAVENOUS; SUBCUTANEOUS at 18:47

## 2019-05-09 RX ADMIN — FENTANYL CITRATE 100 MCG: 50 INJECTION INTRAMUSCULAR; INTRAVENOUS at 16:08

## 2019-05-09 RX ADMIN — FENTANYL CITRATE 25 MCG: 50 INJECTION, SOLUTION INTRAMUSCULAR; INTRAVENOUS at 19:44

## 2019-05-09 RX ADMIN — SODIUM CHLORIDE: 9 INJECTION, SOLUTION INTRAVENOUS at 19:27

## 2019-05-09 RX ADMIN — PROPOFOL 200 MG: 10 INJECTION, EMULSION INTRAVENOUS at 16:08

## 2019-05-09 RX ADMIN — LIDOCAINE HYDROCHLORIDE 100 MG: 20 INJECTION, SOLUTION INTRAVENOUS at 16:08

## 2019-05-09 RX ADMIN — KETOROLAC TROMETHAMINE 15 MG: 30 INJECTION, SOLUTION INTRAMUSCULAR at 10:21

## 2019-05-09 RX ADMIN — ROPINIROLE HYDROCHLORIDE 2 MG: 1 TABLET, FILM COATED ORAL at 00:25

## 2019-05-09 RX ADMIN — PIPERACILLIN SODIUM,TAZOBACTAM SODIUM 3.38 G: 3; .375 INJECTION, POWDER, FOR SOLUTION INTRAVENOUS at 18:22

## 2019-05-09 RX ADMIN — SUGAMMADEX 200 MG: 100 INJECTION, SOLUTION INTRAVENOUS at 18:43

## 2019-05-09 RX ADMIN — ROCURONIUM BROMIDE 20 MG: 50 INJECTION, SOLUTION INTRAVENOUS at 16:58

## 2019-05-09 ASSESSMENT — PULMONARY FUNCTION TESTS
PIF_VALUE: 18
PIF_VALUE: 18
PIF_VALUE: 4
PIF_VALUE: 16
PIF_VALUE: 18
PIF_VALUE: 16
PIF_VALUE: 17
PIF_VALUE: 16
PIF_VALUE: 16
PIF_VALUE: 18
PIF_VALUE: 16
PIF_VALUE: 17
PIF_VALUE: 17
PIF_VALUE: 18
PIF_VALUE: 17
PIF_VALUE: 18
PIF_VALUE: 16
PIF_VALUE: 18
PIF_VALUE: 16
PIF_VALUE: 17
PIF_VALUE: 18
PIF_VALUE: 17
PIF_VALUE: 18
PIF_VALUE: 17
PIF_VALUE: 16
PIF_VALUE: 18
PIF_VALUE: 8
PIF_VALUE: 16
PIF_VALUE: 18
PIF_VALUE: 17
PIF_VALUE: 18
PIF_VALUE: 18
PIF_VALUE: 16
PIF_VALUE: 18
PIF_VALUE: 18
PIF_VALUE: 16
PIF_VALUE: 16
PIF_VALUE: 17
PIF_VALUE: 18
PIF_VALUE: 13
PIF_VALUE: 17
PIF_VALUE: 4
PIF_VALUE: 17
PIF_VALUE: 16
PIF_VALUE: 17
PIF_VALUE: 17
PIF_VALUE: 0
PIF_VALUE: 19
PIF_VALUE: 17
PIF_VALUE: 18
PIF_VALUE: 16
PIF_VALUE: 16
PIF_VALUE: 4
PIF_VALUE: 18
PIF_VALUE: 14
PIF_VALUE: 18
PIF_VALUE: 12
PIF_VALUE: 17
PIF_VALUE: 17
PIF_VALUE: 11
PIF_VALUE: 18
PIF_VALUE: 17
PIF_VALUE: 18
PIF_VALUE: 17
PIF_VALUE: 16
PIF_VALUE: 18
PIF_VALUE: 16
PIF_VALUE: 17
PIF_VALUE: 4
PIF_VALUE: 16
PIF_VALUE: 16
PIF_VALUE: 17
PIF_VALUE: 17
PIF_VALUE: 16
PIF_VALUE: 18
PIF_VALUE: 16
PIF_VALUE: 17
PIF_VALUE: 18
PIF_VALUE: 17
PIF_VALUE: 17
PIF_VALUE: 0
PIF_VALUE: 18
PIF_VALUE: 18
PIF_VALUE: 16
PIF_VALUE: 0
PIF_VALUE: 16
PIF_VALUE: 18
PIF_VALUE: 17
PIF_VALUE: 1
PIF_VALUE: 9
PIF_VALUE: 17
PIF_VALUE: 6
PIF_VALUE: 17
PIF_VALUE: 16
PIF_VALUE: 17
PIF_VALUE: 16
PIF_VALUE: 16
PIF_VALUE: 18
PIF_VALUE: 18
PIF_VALUE: 17
PIF_VALUE: 16
PIF_VALUE: 16
PIF_VALUE: 18
PIF_VALUE: 17
PIF_VALUE: 17
PIF_VALUE: 18
PIF_VALUE: 16
PIF_VALUE: 16
PIF_VALUE: 18
PIF_VALUE: 18
PIF_VALUE: 16
PIF_VALUE: 16
PIF_VALUE: 17
PIF_VALUE: 19
PIF_VALUE: 15
PIF_VALUE: 18
PIF_VALUE: 17
PIF_VALUE: 16
PIF_VALUE: 17
PIF_VALUE: 18
PIF_VALUE: 16
PIF_VALUE: 16
PIF_VALUE: 18
PIF_VALUE: 16
PIF_VALUE: 18
PIF_VALUE: 17
PIF_VALUE: 0
PIF_VALUE: 17
PIF_VALUE: 16
PIF_VALUE: 18
PIF_VALUE: 16
PIF_VALUE: 17
PIF_VALUE: 18
PIF_VALUE: 17
PIF_VALUE: 17
PIF_VALUE: 18
PIF_VALUE: 3
PIF_VALUE: 18
PIF_VALUE: 5
PIF_VALUE: 16
PIF_VALUE: 18
PIF_VALUE: 16
PIF_VALUE: 18
PIF_VALUE: 16
PIF_VALUE: 15
PIF_VALUE: 1
PIF_VALUE: 5

## 2019-05-09 ASSESSMENT — PAIN SCALES - GENERAL
PAINLEVEL_OUTOF10: 0
PAINLEVEL_OUTOF10: 5
PAINLEVEL_OUTOF10: 8
PAINLEVEL_OUTOF10: 0
PAINLEVEL_OUTOF10: 6
PAINLEVEL_OUTOF10: 7
PAINLEVEL_OUTOF10: 6
PAINLEVEL_OUTOF10: 7
PAINLEVEL_OUTOF10: 6
PAINLEVEL_OUTOF10: 0
PAINLEVEL_OUTOF10: 0

## 2019-05-09 ASSESSMENT — PAIN DESCRIPTION - FREQUENCY
FREQUENCY: CONTINUOUS
FREQUENCY: INTERMITTENT
FREQUENCY: CONTINUOUS

## 2019-05-09 ASSESSMENT — PAIN DESCRIPTION - DESCRIPTORS
DESCRIPTORS: DISCOMFORT
DESCRIPTORS: DISCOMFORT
DESCRIPTORS: ACHING;CONSTANT;DISCOMFORT
DESCRIPTORS: DISCOMFORT
DESCRIPTORS: CRAMPING;DISCOMFORT

## 2019-05-09 ASSESSMENT — PAIN DESCRIPTION - LOCATION
LOCATION: ABDOMEN

## 2019-05-09 ASSESSMENT — PAIN - FUNCTIONAL ASSESSMENT
PAIN_FUNCTIONAL_ASSESSMENT: PREVENTS OR INTERFERES SOME ACTIVE ACTIVITIES AND ADLS
PAIN_FUNCTIONAL_ASSESSMENT: PREVENTS OR INTERFERES SOME ACTIVE ACTIVITIES AND ADLS

## 2019-05-09 ASSESSMENT — PAIN DESCRIPTION - PAIN TYPE
TYPE: SURGICAL PAIN
TYPE: SURGICAL PAIN
TYPE: ACUTE PAIN
TYPE: ACUTE PAIN;SURGICAL PAIN
TYPE: SURGICAL PAIN

## 2019-05-09 ASSESSMENT — PAIN DESCRIPTION - ORIENTATION
ORIENTATION: MID
ORIENTATION: MID;LOWER

## 2019-05-09 ASSESSMENT — PAIN DESCRIPTION - ONSET
ONSET: ON-GOING
ONSET: ON-GOING

## 2019-05-09 ASSESSMENT — PAIN DESCRIPTION - PROGRESSION: CLINICAL_PROGRESSION: NOT CHANGED

## 2019-05-09 NOTE — BRIEF OP NOTE
Brief Postoperative Note  ______________________________________________________________    Patient: Ki Wadsworth  YOB: 1950  MRN: [id number]  Date of Procedure: 5/9/2019    Pre-Op Diagnosis: LBO secondary to sigmoid diverticulitis. Post-Op Diagnosis: Same       Procedure(s):  BOWEL RESECTION COLOSTOMY (HARTMANS PROCEDURE) INCIDENTAL APPENDECTOMY MULTIPLE SMALL BOWEL SEROSAL TEAR REPAIRS    Anesthesia: General    Surgeon(s):  Josh Espinosa MD    Assistant: nonphysician    Estimated Blood Loss (mL): 50    Complications: None    Specimens:   ID Type Source Tests Collected by Time Destination   A : APPENDIX Tissue Appendix SURGICAL PATHOLOGY Josh Espinosa MD 5/9/2019 1706    B : SIGMOID COLON  Tissue Colon SURGICAL PATHOLOGY Josh Espinosa MD 5/9/2019 1823        Implants:  * No implants in log *      Drains:   NG/OG/NJ/NE Tube Nasogastric Right nostril (Active)   Surrounding Skin Intact;Dry 5/8/2019  7:21 PM   Securement device Yes 5/8/2019  7:21 PM   Placement Verified by X-Ray (Initial) 5/8/2019  7:21 PM   Output (mL) 500 ml 5/9/2019  6:00 AM       Colostomy LLQ Descending/sigmoid (Active)       Urethral Catheter Non-latex 16 fr (Active)       [REMOVED] NG/OG/NJ/NE Tube Nasogastric 14 fr Right nostril (Removed)   Surrounding Skin Non reddened; Intact 5/8/2019  3:45 PM   Securement device Yes 5/8/2019  3:45 PM   Status Suction-low continuous 5/8/2019  3:45 PM   Placement Verified by External Catheter Length;by X-Ray (repeat) 5/8/2019  3:45 PM   Drainage Appearance Bile 5/8/2019  3:45 PM       Findings: as above    Josh Espinosa MD  Date: 5/9/2019  Time: 6:42 PM

## 2019-05-09 NOTE — CONSULTS
[address], [address] [place] [address]                                  CONSULTATION    PATIENT NAME: Carlos Kenney                    :        1950  MED REC NO:   [id number]                          ROOM:       1187  ACCOUNT NO:   [de-identified]                           ADMIT DATE: 2019  PROVIDER:     Cristina Magallanes MD    CONSULT DATE:  2019    REFERRING PHYSICIAN:  Hospitalist service, Dr. Calista Combs. CHIEF COMPLAINT AND HISTORY OF PRESENT ILLNESS:  The patient is a very  pleasant 50-year-old  female, who has an ongoing history now  since February of left lower quadrant abdominal pain. She has already  been seen twice over in [facility] and imaging studies have been  performed which did reveal sigmoid diverticulitis. She then saw Dr. Calista Combs and she is going to see him in consultation. She was placed on  antibiotic therapy and then starting yesterday into early this morning,  she had increased nausea, episodes of emesis and worsening abdominal  pain. Due to the severity of her pain, she then presented to the  Emergency Room at [facility] for further  evaluation. On that evaluation, she did have a slight leukocytosis of  11,300. Electrolyte panel was within normal limits along with liver  function tests. Additionally, her lactic acid level was 2.0. She then  had a CT of the abdomen and pelvis performed and this did reveal  findings compatible with large bowel obstruction with a transition zone  in the proximal sigmoid colon. She also had multiple dilated loops of  small bowel secondary to the large bowel obstruction. Wall thickening  was present within this area suggestive of her diverticular disease. On  further questioning, she reports that she had multiple colonoscopies in  the past and denies any familial history for colon cancer.   She does  report thin caliber stools, but denies any prior melena, hematochezia,  black tarry stools, or bright red blood per rectum. She also denies any  dysuria or frequency or hematuria. A nasogastric tube was placed. She  got immediate relief from this and is mostly gastric and bilious nature  while seeing her at the bedside. She also reports the abdominal  distention is decreased and her pain has been controlled with IV  medications at this time. She denies any fevers or chills also. PAST MEDICAL HISTORY:  Restless leg syndrome. PAST SURGICAL HISTORY:  Prior history of endometriosis with a left  oophorectomy. HOME MEDICATIONS:  Please refer to the electronic medication  reconciliation sheet. SOCIAL HISTORY:  Prior history of smoking, but has quit. Denies any  alcohol or IV drug abuse. FAMILY HISTORY:  History of dementia also lung cancer. REVIEW OF SYSTEMS:  A 10-point review of systems is otherwise negative  unless stated as above in the history of present illness. PHYSICAL EXAMINATION:  VITAL SIGNS:  Temperature 97.5, respirations 16, pulse 96, blood  pressure 121/84, O2 sat 99% on room air. GENERAL:  No acute distress. Alert, awake, oriented x3, pleasant,  cooperative, very informative patient. HEENT:  Normocephalic, atraumatic. Pupils are equal, round, and  reactive to light. Extraocular movements intact. Trachea is midline. No lymphadenopathy. Anicteric sclerae. Dry mucous membranes. NECK:  Supple. No lymphadenopathy. HEART:  Positive S1, S2.  Normal sinus rhythm. No murmurs, rubs, or  gallops. CHEST:  Clear to auscultation bilaterally. No rales, rhonchi, wheezes,  or crackles. ABDOMEN:  Soft. minimal left lower quadrant tenderness with no rebound  or guarding. Tympanic. No CVA tenderness. No hernias, pulsations or  fluid shifts. Well healed Pfannenstiel incision. EXTREMITIES:  Negative for erythema, edema, cellulitis, or cyanosis. Good capillary refill.   Extremities are well perfused. ASSESSMENT AND PLAN:  A 62-year-old  female who presents with  large bowel obstruction secondary to a likely diverticular stenosis or  stricture. We will continue nasogastric tube decompression at this  time, IV fluid resuscitation, and the plan will be to take her to the  operative room tomorrow for an exploratory laparotomy, sigmoid  colectomy, and a proximal diverting colostomy. I will go ahead and  start her on IV antibiotic coverage at this time. I did discuss the  procedure in detail to the patient along with all risks, benefits and  alternatives and she is in agreement to proceed with surgical  intervention.         Rodolfo Fletcher MD    D: 05/08/2019 18:07:42       T: 05/08/2019 19:23:27     [email or web address]  Job#: [id number]     Doc#: [id number]    CC:  <>

## 2019-05-09 NOTE — PROGRESS NOTES
Hospitalist Progress Note      Name:  Riley Eubanks /Age/Sex: 1950  (80 y.o. female)   MRN & CSN:  [id number] & [id number] Admission Date/Time: 2019  1:19 PM   Location:  [address] [place] [address] PCP: No primary care provider on file. Riley Eubanks is a 80 y.o.  female  who presents with Abdominal Pain (patient states she is having a diverticulitis flare; NV)      Assessment and Plan:   Large Bowel Obstruction, Clinical Diverticulitis  - NPO  - IVF  - NGT  - plan for OR today for Gabriel's  - Empiric Zosyn  - SCDs  - surgery following            Diet Diet NPO Effective Now   Code Status Full Code     Medications:   Medications:    oxymetazoline  2 spray Nasal Once    lidocaine   Topical Once    sodium chloride flush  10 mL Intravenous 2 times per day    famotidine  20 mg Oral BID    piperacillin-tazobactam  3.375 g Intravenous Q8H    rOPINIRole  2 mg Oral BID      Infusions:    sodium chloride 100 mL/hr at 19 2326     PRN Meds:   promethazine 12.5 mg Q6H PRN   LORazepam 1 mg Q6H PRN   morphine 4 mg Q30 Min PRN   ondansetron 4 mg Q30 Min PRN   sodium chloride flush 10 mL PRN   sodium chloride flush 10 mL PRN   ondansetron 4 mg Q6H PRN   ketorolac 15 mg Q6H PRN     Subjective:   No distress, + lower abd pain    Objective: Intake/Output Summary (Last 24 hours) at 2019 0959  Last data filed at 2019 0600  Gross per 24 hour   Intake --   Output 500 ml   Net -500 ml      Vitals:   Vitals:    19 0713   BP: 116/70   Pulse: 70   Resp: 15   Temp: 97.7 °F (36.5 °C)   SpO2: 95%     Physical Exam:   Gen:  awake, alert, cooperative, no apparent distress  Head/Eyes:  Normocephalic atraumatic, EOMI   NECK:   symmetrical, trachea midline  LUNGS: Normal Effort   CARDIOVASCULAR:  Normal rate  ABDOMEN: +mild tenderness in lower abdomen, non distended, no HSM noted. MUSCULOSKELETAL:  ROM WNL  NEUROLOGIC: Alert and Oriented,  Cranial nerves II-XII are grossly intact.    SKIN:  no bruising or bleeding, normal skin color,  no redness      Data:       CBC   Recent Labs     05/08/19  1230 05/09/19  0603   WBC 11.3* 9.1   HGB 13.6 11.9*   HCT 45.6 39.6   * 362      BMP Recent Labs     05/08/19  1230 05/09/19  0603    140   K 4.0 4.0    104   CO2 22 26   BUN 10 10   CREATININE 0.7 0.8         Electronically signed by Angie Newell MD on 5/9/2019 at 9:59 AM

## 2019-05-09 NOTE — PLAN OF CARE
Problem: Pain Control  Goal: Maintain pain level at or below patient's acceptable level (or 5 if patient is unable to determine acceptable level)  Outcome: Ongoing  Goal: Improvement in pain related behaviors BP/HR WNL  Outcome: Ongoing     Problem: GI  Goal: No bowel complications  Outcome: Ongoing  Goal: Bowel movement at least every other day  Outcome: Ongoing     Problem: Nutrition  Goal: Optimal nutrition therapy  Outcome: Ongoing  Goal: Understanding of nutritional guidelines  Outcome: Ongoing     Problem: Intellectual/Education/Knowledge Deficit  Goal: Teaching initiated upon admission  Outcome: Ongoing  Goal: Written Disposition Instruction form completed  Outcome: Ongoing     Problem: Emotional/Psychosocial  Goal: Understanding of community resources  Outcome: Ongoing     Problem: Spiritual  Goal: Coping methods/spiritual issues explored  Outcome: Ongoing  Goal: Acknowledge understanding of advance directives  Outcome: Ongoing  Goal: Supportive care provided  Outcome: Ongoing

## 2019-05-09 NOTE — ANESTHESIA PRE PROCEDURE
Department of Anesthesiology  Preprocedure Note       Name:  Geraldine Eastman   Age:  80 y.o.  :  1950                                          MRN:  [id number]         Date:  2019      Surgeon: Tutu Gottlieb):  Angi Rodriguez MD    Procedure: BOWEL RESECTION COLOSTOMY HARTMANS PROCEDURE (N/A )    Medications prior to admission:   Prior to Admission medications    Medication Sig Start Date End Date Taking? Authorizing Provider   HYDROcodone-acetaminophen (NORCO) 5-325 MG per tablet Take 1 tablet by mouth every 6 hours as needed for Pain. Yes Historical Provider, MD   rOPINIRole (REQUIP) 2 MG tablet Take 2 mg by mouth 2 times daily   Yes Historical Provider, MD   amoxicillin (AMOXIL) 875 MG tablet Take 875 mg by mouth 2 times daily   Yes Historical Provider, MD   atorvastatin (LIPITOR) 10 MG tablet Take 10 mg by mouth daily   Yes Historical Provider, MD   triamcinolone (KENALOG) 0.1 % ointment Apply topically 2 times daily Apply topically 2 times daily.    Yes Historical Provider, MD       Current medications:    Current Facility-Administered Medications   Medication Dose Route Frequency Provider Last Rate Last Dose    promethazine (PHENERGAN) injection 12.5 mg  12.5 mg Intravenous Q6H PRN FABIEN Cox - NP        LORazepam (ATIVAN) injection 1 mg  1 mg Intravenous Q6H PRN Angi Rodriguez MD        morphine sulfate (PF) injection 4 mg  4 mg Intravenous Q30 Min PRN LASHAE Gonzalez-C   4 mg at 19 0024    ondansetron (ZOFRAN) injection 4 mg  4 mg Intravenous Q30 Min PRN LASHAE Gonzalez-C   4 mg at 19 1820    sodium chloride flush 0.9 % injection 10 mL  10 mL Intravenous PRN Ricco Jorge PA-C   10 mL at 19 1457    oxymetazoline (AFRIN) 0.05 % nasal spray 2 spray  2 spray Nasal Once Ricco Jorge PA-C        lidocaine (XYLOCAINE) 2 % jelly   Topical Once Ricco Jorge PA-C        sodium chloride flush 0.9 % injection 10 mL  10 mL Intravenous 2 times per day FABIEN Crews - CNP   10 mL at 05/08/19 2327    sodium chloride flush 0.9 % injection 10 mL  10 mL Intravenous PRN Iva Mcgarry APRN - CNP        ondansetron (ZOFRAN) injection 4 mg  4 mg Intravenous Q6H PRN Iva Mcgarry APRN - CNP        famotidine (PEPCID) tablet 20 mg  20 mg Oral BID FABIEN Crews - CNP   20 mg at 05/08/19 2327    0.9 % sodium chloride infusion   Intravenous Continuous FABIEN Moran -  mL/hr at 05/08/19 2326      ketorolac (TORADOL) injection 15 mg  15 mg Intravenous Q6H PRN Desirae Lowe APRN - CNP        piperacillin-tazobactam (ZOSYN) 3.375 g in dextrose 5 % 50 mL IVPB extended infusion (mini-bag)  3.375 g Intravenous Q8H Pravin Hernandez MD   Stopped at 05/09/19 0658    rOPINIRole (REQUIP) tablet 2 mg  2 mg Oral BID FABIEN Dumont NP   2 mg at 05/09/19 0025       Allergies:  No Known Allergies    Problem List:    Patient Active Problem List   Diagnosis Code    Large bowel obstruction ([facility] K56.609    Elevated lactic acid level R79.89       Past Medical History:        Diagnosis Date    Diverticulitis     Endometriosis     Restless leg syndrome        Past Surgical History:  History reviewed. No pertinent surgical history.     Social History:    Social History     Tobacco Use    Smoking status: Former Smoker    Smokeless tobacco: Never Used   Substance Use Topics    Alcohol use: Not on file                                Counseling given: Not Answered      Vital Signs (Current):   Vitals:    05/08/19 1908 05/08/19 2023 05/08/19 2203 05/09/19 0713   BP: 132/65  116/65 116/70   Pulse: 68  76 70   Resp: 19  18 15   Temp: 36.6 °C (97.9 °F)  36.9 °C (98.5 °F) 36.5 °C (97.7 °F)   TempSrc: Oral  Oral Oral   SpO2: 97%  97% 95%   Weight:  192 lb 1.6 oz (87.1 kg)     Height:                                                  BP Readings from Last 3 Encounters:   05/09/19 116/70       NPO Status: BMI:   Wt Readings from Last 3 Encounters:   05/08/19 192 lb 1.6 oz (87.1 kg)     Body mass index is 28.37 kg/m². CBC:   Lab Results   Component Value Date    WBC 9.1 05/09/2019    RBC 4.57 05/09/2019    HGB 11.9 05/09/2019    HCT 39.6 05/09/2019    MCV 86.7 05/09/2019    RDW 15.9 05/09/2019     05/09/2019       CMP:   Lab Results   Component Value Date     05/09/2019    K 4.0 05/09/2019     05/09/2019    CO2 26 05/09/2019    BUN 10 05/09/2019    CREATININE 0.8 05/09/2019    GFRAA >60 05/09/2019    LABGLOM >60 05/09/2019    GLUCOSE 116 05/09/2019    PROT 8.1 05/08/2019    CALCIUM 9.1 05/09/2019    BILITOT 0.5 05/08/2019    ALKPHOS 77 05/08/2019    AST 17 05/08/2019    ALT 17 05/08/2019       POC Tests: No results for input(s): POCGLU, POCNA, POCK, POCCL, POCBUN, POCHEMO, POCHCT in the last 72 hours.     Coags: No results found for: PROTIME, INR, APTT    HCG (If Applicable): No results found for: PREGTESTUR, PREGSERUM, HCG, HCGQUANT     ABGs: No results found for: PHART, PO2ART, LGS8HVH, HPQ6APP, BEART, M5AHMVKU     Type & Screen (If Applicable):  No results found for: LABABO, [address]    Anesthesia Evaluation  Patient summary reviewed and Nursing notes reviewed no history of anesthetic complications:   Airway: Mallampati: II  TM distance: >3 FB   Neck ROM: full  Mouth opening: > = 3 FB Dental: normal exam         Pulmonary:                              Cardiovascular:    (+) hyperlipidemia      ECG reviewed               Beta Blocker:  Not on Beta Blocker      ROS comment: HR 87   Sinus rhythm with fusion complexes   Otherwise normal ECG   No previous ECGs available   Confirmed by Jessenia Turner MD, Sohail Brock ([id number]) on 5/9/2019 4:57:26 AM      Neuro/Psych:                ROS comment: Patient states she has bone chips/spurs in her skull that can become lodged GI/Hepatic/Renal:   (+) GERD:,          ROS comment: Diverticulitis, bowel

## 2019-05-09 NOTE — PROGRESS NOTES
Pt seen and examined. AF VSS. One episode of emesis last night. NGT with bilious contents. Abd is distended, NR, NG, lower abd tenderness within LLQ. Complicated sigmoid diverticulitis with LBO. Plan for OR this afternoon for Hima's procedure. NPO, NGT, IVF's. SCD's. Zosyn. Consent to be signed and placed in chart. R/B/A's discussed with pt, agrees to proceed with surgical intervention.

## 2019-05-10 LAB
ANION GAP SERPL CALCULATED.3IONS-SCNC: 13 MMOL/L (ref 4–16)
BUN BLDV-MCNC: 13 MG/DL (ref 6–23)
CALCIUM SERPL-MCNC: 8.6 MG/DL (ref 8.3–10.6)
CHLORIDE BLD-SCNC: 104 MMOL/L (ref 99–110)
CO2: 24 MMOL/L (ref 21–32)
CREAT SERPL-MCNC: 0.8 MG/DL (ref 0.6–1.1)
GFR AFRICAN AMERICAN: >60 ML/MIN/1.73M2
GFR NON-AFRICAN AMERICAN: >60 ML/MIN/1.73M2
GLUCOSE BLD-MCNC: 106 MG/DL (ref 70–99)
HCT VFR BLD CALC: 38 % (ref 37–47)
HEMOGLOBIN: 11.2 GM/DL (ref 12.5–16)
MCH RBC QN AUTO: 26.4 PG (ref 27–31)
MCHC RBC AUTO-ENTMCNC: 29.5 % (ref 32–36)
MCV RBC AUTO: 89.4 FL (ref 78–100)
PDW BLD-RTO: 16.2 % (ref 11.7–14.9)
PLATELET # BLD: 338 K/CU MM (ref 140–440)
PMV BLD AUTO: 9.6 FL (ref 7.5–11.1)
POTASSIUM SERPL-SCNC: 4.7 MMOL/L (ref 3.5–5.1)
RBC # BLD: 4.25 M/CU MM (ref 4.2–5.4)
SODIUM BLD-SCNC: 141 MMOL/L (ref 135–145)
WBC # BLD: 5.5 K/CU MM (ref 4–10.5)

## 2019-05-10 PROCEDURE — 6370000000 HC RX 637 (ALT 250 FOR IP): Performed by: SURGERY

## 2019-05-10 PROCEDURE — 2580000003 HC RX 258: Performed by: SURGERY

## 2019-05-10 PROCEDURE — 85027 COMPLETE CBC AUTOMATED: CPT

## 2019-05-10 PROCEDURE — C9113 INJ PANTOPRAZOLE SODIUM, VIA: HCPCS | Performed by: SURGERY

## 2019-05-10 PROCEDURE — 6360000002 HC RX W HCPCS: Performed by: SURGERY

## 2019-05-10 PROCEDURE — 1200000000 HC SEMI PRIVATE

## 2019-05-10 PROCEDURE — 36415 COLL VENOUS BLD VENIPUNCTURE: CPT

## 2019-05-10 PROCEDURE — 80048 BASIC METABOLIC PNL TOTAL CA: CPT

## 2019-05-10 RX ADMIN — PIPERACILLIN SODIUM,TAZOBACTAM SODIUM 3.38 G: 3; .375 INJECTION, POWDER, FOR SOLUTION INTRAVENOUS at 09:33

## 2019-05-10 RX ADMIN — PIPERACILLIN SODIUM,TAZOBACTAM SODIUM 3.38 G: 3; .375 INJECTION, POWDER, FOR SOLUTION INTRAVENOUS at 02:25

## 2019-05-10 RX ADMIN — PANTOPRAZOLE SODIUM 40 MG: 40 INJECTION, POWDER, LYOPHILIZED, FOR SOLUTION INTRAVENOUS at 06:12

## 2019-05-10 RX ADMIN — PIPERACILLIN SODIUM,TAZOBACTAM SODIUM 3.38 G: 3; .375 INJECTION, POWDER, FOR SOLUTION INTRAVENOUS at 18:50

## 2019-05-10 RX ADMIN — SODIUM CHLORIDE: 9 INJECTION, SOLUTION INTRAVENOUS at 09:32

## 2019-05-10 RX ADMIN — SODIUM CHLORIDE: 9 INJECTION, SOLUTION INTRAVENOUS at 17:18

## 2019-05-10 RX ADMIN — SODIUM CHLORIDE: 9 INJECTION, SOLUTION INTRAVENOUS at 02:25

## 2019-05-10 RX ADMIN — ROPINIROLE HYDROCHLORIDE 2 MG: 1 TABLET, FILM COATED ORAL at 09:33

## 2019-05-10 RX ADMIN — ROPINIROLE HYDROCHLORIDE 2 MG: 1 TABLET, FILM COATED ORAL at 23:04

## 2019-05-10 RX ADMIN — ENOXAPARIN SODIUM 40 MG: 40 INJECTION SUBCUTANEOUS at 09:32

## 2019-05-10 ASSESSMENT — PAIN DESCRIPTION - PROGRESSION
CLINICAL_PROGRESSION: NOT CHANGED

## 2019-05-10 ASSESSMENT — PAIN SCALES - GENERAL
PAINLEVEL_OUTOF10: 5
PAINLEVEL_OUTOF10: 8

## 2019-05-10 NOTE — PROGRESS NOTES
POD 1 Hima's procedure. AF VSS  Some pain issues. Will adjust PCA. NGT with bilious contents. Adequate UOP. Abd is soft, colostomy is pink and edematous. Incision c/d/i  Decrease IVF's. Abx's x 24 hrs and then d/c  Lovenox. Await for return of GI function. Mobilize. Check labs. Keep in echols to monitor I/O's.

## 2019-05-10 NOTE — OP NOTE
[address] [place] [address], [address] [place] [address]                                OPERATIVE REPORT    PATIENT NAME: Wu Cardenas                    :        1950  MED REC NO:   [id number]                          ROOM:       4011  ACCOUNT NO:   [de-identified]                           ADMIT DATE: 2019  PROVIDER:     Dax Musa MD    DATE OF PROCEDURE:  2019    PREOPERATIVE DIAGNOSIS:  Large bowel obstruction secondary to  diverticulitis of the sigmoid colon. POSTOPERATIVE DIAGNOSIS:  Large bowel obstruction secondary to  diverticulitis of the sigmoid colon. PROCEDURE PERFORMED:  1. Exploratory laparotomy. 2.  Hima's procedure. 3.  Small bowel serosal tear repairs x2.  4.  Incidental appendectomy. SURGEON:  Dax Musa MD    ANESTHESIA:  General anesthesia combined with endotracheal tube  intubation. SPECIMEN:  1. Sigmoid colon. 2.  Appendix. COMPLICATIONS:  None. DRAINS:  None. ESTIMATED BLOOD LOSS:  50 mL. INTRAOPERATIVE FINDINGS:  Large bowel obstruction caused by very severe  sigmoid diverticulitis. INDICATIONS FOR PROCEDURE:  As follows: This 78-year-old   female who presents with abdominal pain and obstructive symptoms due to  history of sigmoid diverticulitis since 2019. It then became so  severe she presented to [facility] for further  evaluation. CT imaging did revealed large bowel obstruction secondary  to a obstructive process in the sigmoid colon. A nasogastric tube was  placed. IV fluid was started along with IV antibiotics. She was then  admitted to the hospital.  There is no evidence of any perforation  within this area or intraabdominal abscess. She had no evidence of any  pneumoperitoneum either. She did have a slight leukocytosis on  admission. She was then brought into the hospital overnight and  resuscitated with IV fluids. She felt much better after having  nasogastric tube placed. An incision was then made. I recommend we  proceed to the operating room for Hima's procedure. I did discuss  all the risks, benefits and alternatives of the procedure in detail with  the patient. Her operative consent was signed and placed upon the  chart. DESCRIPTION OF PROCEDURE:  The patient was brought to the operating  room, placed in the supine position. Bilateral lower extremity  compression boots were placed. General anesthesia combined with  endotracheal tube intubation was then performed. A Mendez catheter was  then inserted. The abdomen was then prepped and draped in the usual  sterile fashion. Universal time-out was performed verifying the  patient, date of birth, site of surgery and we were all in agreement to  proceed. A vertical midline incision was then made half way between the  xiphoid process on the umbilicus to just above the pubis. This was then  deepened to the subcutaneous tissues and hemostasis was achieved with  electrocautery. Linea alba was identified and incised. The peritoneal  cavity was then entered. The abdomen was explored. A small bowel  adhesion was then identified to the diverticular process with sigmoid  colon and these were then bluntly taken down along sharply under direct  vision with Metzenbaum scissors. Examination of the small bowel  demonstrated two areas of serosal tears that did occurred during this  process. These were repaired with 3-0 silk sutures in a Lembert fashion  along the _____ and I repaired these in a transverse manner _____ the  small bowel lumen. The small bowel was then inspected from the ligament  of Treitz to ileocecal valve and no other abnormalities were noted. During this process, I had also noticed that the appendix was very  dilated from the large bowel obstruction process.   I did not have any  gross findings from the appendicitis, but due to the size of incision was deepened through all layers  of the abdominal wall and dilated to admit two fingers. The colon was  then passed out through the ostomy site without torsion or tension. It  was then tagged with interrupted sutures of 3-0 silk. I did tagged my  rectal stump on the sides of the staple line with 2.0 Prolene sutures. I then copiously irrigated out the abdominal cavity with 1 liter of warm  normal saline solution. Hemostasis was showing to be intact. The small  bowel was then placed back and the self-retaining Bookwalter device was  then removed. Small bowel was reexamined. No other abnormalities were  noted. It was then placed back into the abdominal cavity lacy S-like  fashion. Fascia was then closed with a running suture of #1 looped PDS  and subcutaneous tissue was then closed with a running 3-0 Vicryl. The  skin was then approximated with surgical staples. A dry sterile  dressing with Tegaderm was then applied. The colostomy was then sutured  with multiple interrupted sutures of 4-0 Vicryl. Ostomy bag was then  applied. The patient tolerated the procedure well and was taken to the  post anesthetic care unit in satisfactory and stable condition. All  instrument, sponge and needle counts were correct at the end of the  case. Jesica Orantes MD    D: 05/10/2019 6:17:10       T: 05/10/2019 9:49:26     [id number]  Job#: [id number]     Doc#: [id number]    CC:   Terrence Post MD

## 2019-05-10 NOTE — CARE COORDINATION
Reviewed chart and spoke with pt about discharge needs/ plans. Pt lives with her partner and her  lives in [place]. PTA she was independent but was struggling d/t abd pain. Pt has a PCP but does not like him, gave her PCP list of local PCPs taking new pt's. Pt has  Insurance and can afford her medications. Discussed possible need for SNU or Home Care. She is not open to discussing either once at this point d/t pain. CM will revisit Monday AM.  Patients white board updated and  card provided to pt/family.

## 2019-05-10 NOTE — PROGRESS NOTES
Hospitalist Progress Note      Name:  Julia Horton /Age/Sex: 1950  (80 y.o. female)   MRN & CSN:  [id number] & [id number] Admission Date/Time: 2019  1:19 PM   Location:  [address] [place] [address] PCP: No primary care provider on file. Julia Horton is a 80 y.o.  female  who presents with Abdominal Pain (patient states she is having a diverticulitis flare; NV)      Assessment and Plan:   Large Bowel Obstruction, Clinical Diverticulitis, Appendicitis  - s/p Bowel resection colostomy, incidental appendectomy, multiple small bowel serosal tear repairs POD#1  - NPO  - IVF  - NGT  - dilaudid PCA for pain  - Empiric Zosyn for 1 more day  - lovenox   - surgery following                Diet Diet NPO Effective Now   Code Status Full Code     Medications:   Medications:    pantoprazole  40 mg Intravenous Daily    enoxaparin  40 mg Subcutaneous Daily    oxymetazoline  2 spray Nasal Once    lidocaine   Topical Once    sodium chloride flush  10 mL Intravenous 2 times per day    piperacillin-tazobactam  3.375 g Intravenous Q8H    rOPINIRole  2 mg Oral BID      Infusions:    sodium chloride 125 mL/hr at 05/10/19 0834    HYDROmorphone       PRN Meds:   promethazine 12.5 mg Q6H PRN   LORazepam 1 mg Q6H PRN   naloxone 0.4 mg PRN   diphenhydrAMINE 25 mg Q6H PRN   ondansetron 4 mg Q30 Min PRN   sodium chloride flush 10 mL PRN   sodium chloride flush 10 mL PRN   ondansetron 4 mg Q6H PRN   ketorolac 15 mg Q6H PRN     Subjective:   Reports mid abd pain, feels thirsty    Objective:        Intake/Output Summary (Last 24 hours) at 5/10/2019 0914  Last data filed at 5/10/2019 0740  Gross per 24 hour   Intake 4082.5 ml   Output 1035 ml   Net 3047.5 ml      Vitals:   Vitals:    05/10/19 0700   BP: 135/74   Pulse: 87   Resp: 15   Temp:    SpO2: 100%     Physical Exam:   Gen:  awake, alert, cooperative, no apparent distress  Head/Eyes:  Normocephalic atraumatic, EOMI   NECK:   symmetrical, trachea midline  LUNGS: Normal Effort CARDIOVASCULAR:  Normal rate  ABDOMEN: +tenderness to palpation  MUSCULOSKELETAL:  ROM WNL  NEUROLOGIC: Alert and Oriented,  Cranial nerves II-XII are grossly intact.    SKIN:  no bruising or bleeding, normal skin color,  no redness      Data:       CBC   Recent Labs     05/08/19  1230 05/09/19  0603   WBC 11.3* 9.1   HGB 13.6 11.9*   HCT 45.6 39.6   * 362      BMP Recent Labs     05/08/19  1230 05/09/19  0603    140   K 4.0 4.0    104   CO2 22 26   BUN 10 10   CREATININE 0.7 0.8         Electronically signed by Miki Whitlock MD on 5/10/2019 at 9:14 AM

## 2019-05-10 NOTE — ANESTHESIA POSTPROCEDURE EVALUATION
Department of Anesthesiology  Postprocedure Note    Patient: Fam Scott  MRN: [id number]  YOB: 1950  Date of evaluation: 5/10/2019  Time:  8:33 AM     Procedure Summary     Date:  05/09/19 Room / Location:  [place] [address] 03 / [facility]    Anesthesia Start:  3878 Anesthesia Stop:  1912    Procedure:  BOWEL RESECTION COLOSTOMY HARTMANS PROCEDURE INCIDENTAL APPENDECTOMY MULTIPLE SMALL BOWEL SEROSAL REPAIR (N/A Abdomen) Diagnosis:  (BOWEL)    Surgeon:  Trinidad Arceo MD Responsible Provider:  Alisa Sanders MD    Anesthesia Type:  general ASA Status:  3          Anesthesia Type: general    Sheila Phase I: Sheila Score: 8    Sheila Phase II:      Last vitals: Reviewed and per EMR flowsheets.        Anesthesia Post Evaluation    Patient location during evaluation: PACU  Patient participation: complete - patient participated  Level of consciousness: awake and alert  Pain score: 3  Airway patency: patent  Nausea & Vomiting: no nausea and no vomiting  Complications: no  Cardiovascular status: blood pressure returned to baseline  Respiratory status: acceptable  Hydration status: euvolemic

## 2019-05-10 NOTE — PLAN OF CARE
Problem: Falls - Risk of:  Goal: Will remain free from falls  Description  Will remain free from falls  Outcome: Met This Shift  Goal: Absence of physical injury  Description  Absence of physical injury  Outcome: Met This Shift     Problem: Pain Control  Goal: Maintain pain level at or below patient's acceptable level (or 5 if patient is unable to determine acceptable level)  Outcome: Ongoing  Goal: Improvement in pain related behaviors BP/HR WNL  Outcome: Ongoing     Problem: GI  Goal: No bowel complications  Outcome: Ongoing  Goal: Bowel movement at least every other day  Outcome: Ongoing     Problem: Nutrition  Goal: Optimal nutrition therapy  Outcome: Ongoing  Goal: Understanding of nutritional guidelines  Outcome: Ongoing     Problem: Intellectual/Education/Knowledge Deficit  Goal: Teaching initiated upon admission  Outcome: Ongoing  Goal: Written Disposition Instruction form completed  Outcome: Ongoing     Problem: Emotional/Psychosocial  Goal: Understanding of community resources  Outcome: Ongoing     Problem: Spiritual  Goal: Coping methods/spiritual issues explored  Outcome: Ongoing  Goal: Acknowledge understanding of advance directives  Outcome: Ongoing  Goal: Supportive care provided  Outcome: Ongoing

## 2019-05-11 PROCEDURE — 6360000002 HC RX W HCPCS: Performed by: SURGERY

## 2019-05-11 PROCEDURE — 2700000000 HC OXYGEN THERAPY PER DAY

## 2019-05-11 PROCEDURE — 6370000000 HC RX 637 (ALT 250 FOR IP): Performed by: SURGERY

## 2019-05-11 PROCEDURE — 1200000000 HC SEMI PRIVATE

## 2019-05-11 PROCEDURE — 94761 N-INVAS EAR/PLS OXIMETRY MLT: CPT

## 2019-05-11 PROCEDURE — 2580000003 HC RX 258: Performed by: SURGERY

## 2019-05-11 PROCEDURE — C9113 INJ PANTOPRAZOLE SODIUM, VIA: HCPCS | Performed by: SURGERY

## 2019-05-11 RX ORDER — HYDROMORPHONE HCL 110MG/55ML
1 PATIENT CONTROLLED ANALGESIA SYRINGE INTRAVENOUS
Status: DISCONTINUED | OUTPATIENT
Start: 2019-05-11 | End: 2019-05-15 | Stop reason: HOSPADM

## 2019-05-11 RX ORDER — OXYCODONE HYDROCHLORIDE AND ACETAMINOPHEN 5; 325 MG/1; MG/1
1 TABLET ORAL EVERY 4 HOURS PRN
Status: DISCONTINUED | OUTPATIENT
Start: 2019-05-11 | End: 2019-05-15 | Stop reason: HOSPADM

## 2019-05-11 RX ADMIN — ONDANSETRON 4 MG: 2 INJECTION INTRAMUSCULAR; INTRAVENOUS at 21:33

## 2019-05-11 RX ADMIN — PANTOPRAZOLE SODIUM 40 MG: 40 INJECTION, POWDER, LYOPHILIZED, FOR SOLUTION INTRAVENOUS at 06:46

## 2019-05-11 RX ADMIN — PROMETHAZINE HYDROCHLORIDE 12.5 MG: 25 INJECTION INTRAMUSCULAR; INTRAVENOUS at 13:25

## 2019-05-11 RX ADMIN — SODIUM CHLORIDE: 9 INJECTION, SOLUTION INTRAVENOUS at 13:27

## 2019-05-11 RX ADMIN — ENOXAPARIN SODIUM 40 MG: 40 INJECTION SUBCUTANEOUS at 10:24

## 2019-05-11 RX ADMIN — ROPINIROLE HYDROCHLORIDE 2 MG: 1 TABLET, FILM COATED ORAL at 10:24

## 2019-05-11 RX ADMIN — SODIUM CHLORIDE: 9 INJECTION, SOLUTION INTRAVENOUS at 01:39

## 2019-05-11 RX ADMIN — PIPERACILLIN SODIUM,TAZOBACTAM SODIUM 3.38 G: 3; .375 INJECTION, POWDER, FOR SOLUTION INTRAVENOUS at 02:39

## 2019-05-11 ASSESSMENT — PAIN SCALES - GENERAL: PAINLEVEL_OUTOF10: 4

## 2019-05-11 NOTE — PROGRESS NOTES
Hospitalist Progress Note      Name:  Kristopher Ontiveros /Age/Sex: 1950  (80 y.o. female)   MRN & CSN:  [id number] & [id number] Admission Date/Time: 2019  1:19 PM   Location:  [address] [place] [address] PCP: No primary care provider on file. Kristopher Ontiveros is a 80 y.o.  female  who presents with Abdominal Pain (patient states she is having a diverticulitis flare; NV)      Assessment and Plan:   Large Bowel Obstruction, Clinical Diverticulitis, Appendicitis  - s/p Bowel resection colostomy, incidental appendectomy, multiple small bowel serosal tear repairs POD#2  - NPO, advanced to clear liquids  - IVF  - NGT out today  - dilaudid PCA for pain  - zosyn stopped   - lovenox   - surgery following  - ambulate                   Diet DIET CLEAR LIQUID;   Code Status Full Code     Medications:   Medications:    pantoprazole  40 mg Intravenous Daily    enoxaparin  40 mg Subcutaneous Daily    oxymetazoline  2 spray Nasal Once    lidocaine   Topical Once    sodium chloride flush  10 mL Intravenous 2 times per day    rOPINIRole  2 mg Oral BID      Infusions:    sodium chloride 50 mL/hr at 19 0646    HYDROmorphone       PRN Meds:   oxyCODONE-acetaminophen 1 tablet Q4H PRN   HYDROmorphone 1 mg Q3H PRN   promethazine 12.5 mg Q6H PRN   LORazepam 1 mg Q6H PRN   naloxone 0.4 mg PRN   diphenhydrAMINE 25 mg Q6H PRN   ondansetron 4 mg Q30 Min PRN   sodium chloride flush 10 mL PRN   sodium chloride flush 10 mL PRN   ondansetron 4 mg Q6H PRN   ketorolac 15 mg Q6H PRN     Subjective:   +BM, less pain    Objective:        Intake/Output Summary (Last 24 hours) at 2019 0935  Last data filed at 2019 0856  Gross per 24 hour   Intake 1711.67 ml   Output 1400 ml   Net 311.67 ml      Vitals:   Vitals:    19 0856   BP: 133/73   Pulse: 93   Resp: 15   Temp: 98.6 °F (37 °C)   SpO2: 99%     Physical Exam:   Gen:  awake, alert, cooperative, no apparent distress  Head/Eyes:  Normocephalic atraumatic, EOMI   NECK: symmetrical, trachea midline  LUNGS: Normal Effort   CARDIOVASCULAR:  Normal rate  ABDOMEN: Non tender, non distended, no HSM noted. MUSCULOSKELETAL:  ROM WNL  NEUROLOGIC: Alert and Oriented,  Cranial nerves II-XII are grossly intact.    SKIN:  no bruising or bleeding, normal skin color,  no redness      Data:       CBC   Recent Labs     05/08/19  1230 05/09/19  0603 05/10/19  1255   WBC 11.3* 9.1 5.5   HGB 13.6 11.9* 11.2*   HCT 45.6 39.6 38.0   * 362 338      BMP Recent Labs     05/08/19  1230 05/09/19  0603 05/10/19  1255    140 141   K 4.0 4.0 4.7    104 104   CO2 22 26 24   BUN 10 10 13   CREATININE 0.7 0.8 0.8         Electronically signed by Oswaldo Tam MD on 5/11/2019 at 9:35 AM

## 2019-05-11 NOTE — PROGRESS NOTES
POD 2 Hima's procedure. AF VSS  Still with some pain issues. Will adjust PCA. NGT with bilious contents,decreased. Excellent UOP. Abd is soft, colostomy is pink and edematous. Some stool in appliance now. Incision c/d/i  Decrease IVF's. Remove abx's. Lovenox. I removed NGT. Start clrs and percocet  Mobilize. Keep in echols to monitor I/O's.

## 2019-05-12 PROCEDURE — 6360000002 HC RX W HCPCS: Performed by: SURGERY

## 2019-05-12 PROCEDURE — C9113 INJ PANTOPRAZOLE SODIUM, VIA: HCPCS | Performed by: SURGERY

## 2019-05-12 PROCEDURE — 6370000000 HC RX 637 (ALT 250 FOR IP): Performed by: SURGERY

## 2019-05-12 PROCEDURE — 2700000000 HC OXYGEN THERAPY PER DAY

## 2019-05-12 PROCEDURE — 94761 N-INVAS EAR/PLS OXIMETRY MLT: CPT

## 2019-05-12 PROCEDURE — 1200000000 HC SEMI PRIVATE

## 2019-05-12 PROCEDURE — 2580000003 HC RX 258: Performed by: SURGERY

## 2019-05-12 RX ADMIN — ROPINIROLE HYDROCHLORIDE 2 MG: 1 TABLET, FILM COATED ORAL at 20:31

## 2019-05-12 RX ADMIN — PANTOPRAZOLE SODIUM 40 MG: 40 INJECTION, POWDER, LYOPHILIZED, FOR SOLUTION INTRAVENOUS at 06:01

## 2019-05-12 RX ADMIN — ONDANSETRON 4 MG: 2 INJECTION INTRAMUSCULAR; INTRAVENOUS at 14:38

## 2019-05-12 RX ADMIN — ENOXAPARIN SODIUM 40 MG: 40 INJECTION SUBCUTANEOUS at 09:21

## 2019-05-12 RX ADMIN — SODIUM CHLORIDE, PRESERVATIVE FREE 10 ML: 5 INJECTION INTRAVENOUS at 09:22

## 2019-05-12 RX ADMIN — ROPINIROLE HYDROCHLORIDE 2 MG: 1 TABLET, FILM COATED ORAL at 09:21

## 2019-05-12 RX ADMIN — PROMETHAZINE HYDROCHLORIDE 12.5 MG: 25 INJECTION INTRAMUSCULAR; INTRAVENOUS at 19:03

## 2019-05-12 NOTE — PROGRESS NOTES
Hospitalist Progress Note      Name:  Georgia Lynch /Age/Sex: 1950  (80 y.o. female)   MRN & CSN:  [id number] & [id number] Admission Date/Time: 2019  1:19 PM   Location:  [address] [place] [address] PCP: No primary care provider on file. Georgia Lynch is a 80 y.o.  female  who presents with Abdominal Pain (patient states she is having a diverticulitis flare; NV)      Assessment and Plan:   Large Bowel Obstruction, Clinical Diverticulitis, Appendicitis  - s/p Bowel resection colostomy, incidental appendectomy, multiple small bowel serosal tear repairs POD#3  - full liquids  - IVF decreased  - NGT out   - dilaudid PCA for pain  - zosyn stopped   - lovenox   - surgery following  - ambulate                   Diet DIET FULL LIQUID;   Code Status Full Code     Medications:   Medications:    pantoprazole  40 mg Intravenous Daily    enoxaparin  40 mg Subcutaneous Daily    lidocaine   Topical Once    sodium chloride flush  10 mL Intravenous 2 times per day    rOPINIRole  2 mg Oral BID      Infusions:    sodium chloride 25 mL/hr at 19 0601    HYDROmorphone       PRN Meds:   oxyCODONE-acetaminophen 1 tablet Q4H PRN   HYDROmorphone 1 mg Q3H PRN   promethazine 12.5 mg Q6H PRN   LORazepam 1 mg Q6H PRN   naloxone 0.4 mg PRN   diphenhydrAMINE 25 mg Q6H PRN   ondansetron 4 mg Q30 Min PRN   sodium chloride flush 10 mL PRN   sodium chloride flush 10 mL PRN   ondansetron 4 mg Q6H PRN   ketorolac 15 mg Q6H PRN     Subjective:     Doing better  Objective:        Intake/Output Summary (Last 24 hours) at 2019 0914  Last data filed at 2019 0819  Gross per 24 hour   Intake 932 ml   Output 1350 ml   Net -418 ml      Vitals:   Vitals:    19 0554   BP: 122/68   Pulse: 88   Resp: 16   Temp: 98 °F (36.7 °C)   SpO2: 100%     Physical Exam:   Gen:  awake, alert, cooperative, no apparent distress  Head/Eyes:  Normocephalic atraumatic, EOMI   NECK:   symmetrical, trachea midline  LUNGS: Normal Effort CARDIOVASCULAR:  Normal rate  ABDOMEN: mild tender, non distended, no HSM noted. MUSCULOSKELETAL:  ROM WNL  NEUROLOGIC: Alert and Oriented,  Cranial nerves II-XII are grossly intact.    SKIN:  no bruising or bleeding, normal skin color,  no redness      Data:       CBC   Recent Labs     05/10/19  1255   WBC 5.5   HGB 11.2*   HCT 38.0         BMP Recent Labs     05/10/19  1255      K 4.7      CO2 24   BUN 13   CREATININE 0.8         Electronically signed by Gene Mendez MD on 5/12/2019 at 9:14 AM

## 2019-05-13 LAB
ANION GAP SERPL CALCULATED.3IONS-SCNC: 13 MMOL/L (ref 4–16)
BUN BLDV-MCNC: 8 MG/DL (ref 6–23)
CALCIUM SERPL-MCNC: 8.4 MG/DL (ref 8.3–10.6)
CHLORIDE BLD-SCNC: 98 MMOL/L (ref 99–110)
CO2: 26 MMOL/L (ref 21–32)
CREAT SERPL-MCNC: 0.5 MG/DL (ref 0.6–1.1)
GFR AFRICAN AMERICAN: >60 ML/MIN/1.73M2
GFR NON-AFRICAN AMERICAN: >60 ML/MIN/1.73M2
GLUCOSE BLD-MCNC: 105 MG/DL (ref 70–99)
HCT VFR BLD CALC: 34.8 % (ref 37–47)
HEMOGLOBIN: 10.7 GM/DL (ref 12.5–16)
MCH RBC QN AUTO: 26.2 PG (ref 27–31)
MCHC RBC AUTO-ENTMCNC: 30.7 % (ref 32–36)
MCV RBC AUTO: 85.1 FL (ref 78–100)
PDW BLD-RTO: 15.2 % (ref 11.7–14.9)
PLATELET # BLD: 303 K/CU MM (ref 140–440)
PMV BLD AUTO: 9.4 FL (ref 7.5–11.1)
POTASSIUM SERPL-SCNC: ABNORMAL MMOL/L (ref 3.5–5.1)
RBC # BLD: 4.09 M/CU MM (ref 4.2–5.4)
SODIUM BLD-SCNC: 137 MMOL/L (ref 135–145)
WBC # BLD: 7 K/CU MM (ref 4–10.5)

## 2019-05-13 PROCEDURE — 6370000000 HC RX 637 (ALT 250 FOR IP): Performed by: SURGERY

## 2019-05-13 PROCEDURE — 6360000002 HC RX W HCPCS: Performed by: SURGERY

## 2019-05-13 PROCEDURE — 85027 COMPLETE CBC AUTOMATED: CPT

## 2019-05-13 PROCEDURE — 6370000000 HC RX 637 (ALT 250 FOR IP): Performed by: FAMILY MEDICINE

## 2019-05-13 PROCEDURE — 99213 OFFICE O/P EST LOW 20 MIN: CPT

## 2019-05-13 PROCEDURE — 1200000000 HC SEMI PRIVATE

## 2019-05-13 PROCEDURE — C9113 INJ PANTOPRAZOLE SODIUM, VIA: HCPCS | Performed by: SURGERY

## 2019-05-13 PROCEDURE — 36415 COLL VENOUS BLD VENIPUNCTURE: CPT

## 2019-05-13 PROCEDURE — 97116 GAIT TRAINING THERAPY: CPT

## 2019-05-13 PROCEDURE — 80048 BASIC METABOLIC PNL TOTAL CA: CPT

## 2019-05-13 PROCEDURE — 97162 PT EVAL MOD COMPLEX 30 MIN: CPT

## 2019-05-13 PROCEDURE — 97166 OT EVAL MOD COMPLEX 45 MIN: CPT

## 2019-05-13 PROCEDURE — 97530 THERAPEUTIC ACTIVITIES: CPT

## 2019-05-13 RX ORDER — POTASSIUM CHLORIDE 20 MEQ/1
40 TABLET, EXTENDED RELEASE ORAL 2 TIMES DAILY WITH MEALS
Status: COMPLETED | OUTPATIENT
Start: 2019-05-13 | End: 2019-05-14

## 2019-05-13 RX ADMIN — ROPINIROLE HYDROCHLORIDE 2 MG: 1 TABLET, FILM COATED ORAL at 20:57

## 2019-05-13 RX ADMIN — PANTOPRAZOLE SODIUM 40 MG: 40 INJECTION, POWDER, LYOPHILIZED, FOR SOLUTION INTRAVENOUS at 06:21

## 2019-05-13 RX ADMIN — ONDANSETRON 4 MG: 2 INJECTION INTRAMUSCULAR; INTRAVENOUS at 01:10

## 2019-05-13 RX ADMIN — POTASSIUM CHLORIDE 40 MEQ: 20 TABLET, EXTENDED RELEASE ORAL at 17:22

## 2019-05-13 RX ADMIN — ENOXAPARIN SODIUM 40 MG: 40 INJECTION SUBCUTANEOUS at 09:43

## 2019-05-13 RX ADMIN — ONDANSETRON 4 MG: 2 INJECTION INTRAMUSCULAR; INTRAVENOUS at 12:26

## 2019-05-13 RX ADMIN — ROPINIROLE HYDROCHLORIDE 2 MG: 1 TABLET, FILM COATED ORAL at 09:42

## 2019-05-13 ASSESSMENT — PAIN DESCRIPTION - DESCRIPTORS
DESCRIPTORS: CONSTANT;SORE
DESCRIPTORS: CONSTANT;SHARP

## 2019-05-13 ASSESSMENT — PAIN DESCRIPTION - PROGRESSION
CLINICAL_PROGRESSION: GRADUALLY WORSENING
CLINICAL_PROGRESSION: NOT CHANGED

## 2019-05-13 ASSESSMENT — PAIN SCALES - GENERAL
PAINLEVEL_OUTOF10: 0
PAINLEVEL_OUTOF10: 3
PAINLEVEL_OUTOF10: 6
PAINLEVEL_OUTOF10: 1
PAINLEVEL_OUTOF10: 5

## 2019-05-13 ASSESSMENT — PAIN DESCRIPTION - ORIENTATION
ORIENTATION: LEFT;MID
ORIENTATION: MID

## 2019-05-13 ASSESSMENT — PAIN DESCRIPTION - ONSET
ONSET: ON-GOING
ONSET: ON-GOING

## 2019-05-13 ASSESSMENT — PAIN DESCRIPTION - PAIN TYPE
TYPE: SURGICAL PAIN
TYPE: SURGICAL PAIN

## 2019-05-13 ASSESSMENT — PAIN DESCRIPTION - FREQUENCY
FREQUENCY: CONTINUOUS
FREQUENCY: CONTINUOUS

## 2019-05-13 ASSESSMENT — PAIN DESCRIPTION - LOCATION
LOCATION: ABDOMEN
LOCATION: ABDOMEN

## 2019-05-13 NOTE — PROGRESS NOTES
Visit to room and delivered odor eliminator spray, had patient sign Tuan's form for free starter kit, provided [employer] literature for stoma care. Patient up in chair and spouse in room. She requested an emesis basin C/O nausea. Call light within reach.   Electronically signed by Adrien Sky RN on 5/13/2019 at 3:44 PM

## 2019-05-13 NOTE — CONSULTS
[facility] PHYSICAL THERAPY EVALUATION  Karthikeyan Norman, 1950, 4011/4011-A, 5/13/2019    History  Torres Martinez:  The encounter diagnosis was Large bowel obstruction ([address] Patient  has a past medical history of Diverticulitis, Endometriosis, and Restless leg syndrome. Patient  has a past surgical history that includes colostomy (N/A, 5/9/2019). Subjective:  Patient states:  Amenable to therapy. Pain:  5/10, abdomen. Communication with other providers:  Handoff to RN, co-eval with OT. Restrictions: Fall risk    Home Setup/Prior level of function  Social/Functional History  Lives With: Spouse  Type of Home: House  Home Layout: One level, Work area in basement(goes to basement frequently but could live on one floor)  Home Access: Stairs to enter without rails  Entrance Stairs - Number of Steps: 1  Bathroom Shower/Tub: Walk-in shower, Shower chair with back  Bathroom Toilet: Standard  Bathroom Equipment: Grab bars in shower  Bathroom Accessibility: Accessible  Home Equipment: Standard walker, Cane  ADL Assistance: Independent  Homemaking Assistance: Needs assistance  Ambulation Assistance: Independent  Transfer Assistance: Independent  Active : Yes  Mode of Transportation: Car  Type of occupation: garage selling  Additional Comments: Pt reports no falls in last 6 months    Examination of body systems (includes body structures/functions, activity/participation limitations):  · Observation:  Supine in bed upon arrival   · Vision:  WFL, glasses  · Hearing:  [facility]  · Cardiopulmonary: On 4L, no O2 needs at baseline, performed entire assessment without O2, maintained O2 sats at 98%  · Cognition: WFL, see OT/SLP note for further evaluation. Musculoskeletal  · ROM R/L:  WFL. · Strength R/L:  4+/5, min weakness in function and endurance. Mobility:  · Supine to sit:  SBA  · Transfers: CGA  · Sitting balance:  CGA. · Standing balance:  CGA.     · Gait: Min A    [facility] 6 Clicks

## 2019-05-13 NOTE — PROGRESS NOTES
Hospitalist Progress Note      Name:  Marsa Peabody /Age/Sex: 1950  (80 y.o. female)   MRN & CSN:  [id number] & [id number] Admission Date/Time: 2019  1:19 PM   Location:  [address] [place] [address] PCP: No primary care provider on file. Marsa Peabody is a 80 y.o.  female  who presents with Abdominal Pain (patient states she is having a diverticulitis flare; NV)      Assessment and Plan:   Large Bowel Obstruction, Clinical Diverticulitis, Appendicitis  - s/p Bowel resection colostomy, incidental appendectomy, multiple small bowel serosal tear repairs POD#4  - full liquids, advanced to gen diet today  - IVF stopped  - NGT out   - dilaudid PCA for pain, weaned  - zosyn stopped   - lovenox   - surgery following  - ambulate                Diet DIET GENERAL;   Code Status Full Code     Medications:   Medications:    pantoprazole  40 mg Intravenous Daily    enoxaparin  40 mg Subcutaneous Daily    lidocaine   Topical Once    sodium chloride flush  10 mL Intravenous 2 times per day    rOPINIRole  2 mg Oral BID      Infusions:    HYDROmorphone       PRN Meds:   oxyCODONE-acetaminophen 1 tablet Q4H PRN   HYDROmorphone 1 mg Q3H PRN   promethazine 12.5 mg Q6H PRN   LORazepam 1 mg Q6H PRN   naloxone 0.4 mg PRN   diphenhydrAMINE 25 mg Q6H PRN   sodium chloride flush 10 mL PRN   sodium chloride flush 10 mL PRN   ondansetron 4 mg Q6H PRN   ketorolac 15 mg Q6H PRN     Subjective:   Getting wound care done currently, no distress    Objective:        Intake/Output Summary (Last 24 hours) at 2019 0940  Last data filed at 2019 0705  Gross per 24 hour   Intake 150 ml   Output 1650 ml   Net -1500 ml      Vitals:   Vitals:    19 0428   BP: 135/68   Pulse: 88   Resp: 16   Temp: 98.6 °F (37 °C)   SpO2: 100%     Physical Exam:   Gen:  awake, alert, cooperative, no apparent distress  Head/Eyes:  Normocephalic atraumatic, EOMI   NECK:   symmetrical, trachea midline  LUNGS: Normal Effort   CARDIOVASCULAR:  Normal rate  ABDOMEN: did not assess  MUSCULOSKELETAL:  ROM WNL  NEUROLOGIC: Alert and Oriented,  Cranial nerves II-XII are grossly intact.    SKIN:  no bruising or bleeding, normal skin color,  no redness      Data:       CBC   Recent Labs     05/10/19  1255   WBC 5.5   HGB 11.2*   HCT 38.0         BMP Recent Labs     05/10/19  1255      K 4.7      CO2 24   BUN 13   CREATININE 0.8         Electronically signed by Akbar Andrade MD on 5/13/2019 at 9:40 AM

## 2019-05-13 NOTE — PROGRESS NOTES
Occupational Therapy   Occupational Therapy Initial Assessment  Date: 2019   Patient Name: Rowena Puentes  MRN: [id number]     : 1950    Date of Service: 2019    Discharge Recommendations:  Home with Home health OT, S Level 1, 24 hour supervision or assist(from )  OT Equipment Recommendations  Other: defer    Assessment   Performance deficits / Impairments: Decreased functional mobility ; Decreased endurance;Decreased ADL status; Decreased safe awareness;Decreased high-level IADLs;Decreased balance  Treatment Diagnosis: Large bowel obstruction  Prognosis: Good  Decision Making: Medium Complexity  Assistance / Modification: Pt is a 79 yo female admitted from home for bowel obstruction. Pt at baseline is Independent for ADLs, needs assistance for high level IADLs and is Independent for functional mobility/transfers w/ AD. Pt currently presents w/ above deficits and requires increased assistance for functional activities. Pt would benefit from continued acute care OT services w/ discharge to home w/ home health OT S1 w/  supervision/assist from . Patient Education: ot role, discharge rec, log rolling bed mobility  Barriers to Learning: none  REQUIRES OT FOLLOW UP: Yes  Activity Tolerance  Activity Tolerance: Patient Tolerated treatment well  Safety Devices  Safety Devices in place: Yes  Type of devices: All fall risk precautions in place;Nurse notified;Call light within reach; Chair alarm in place; Left in chair;Patient at risk for falls;Gait belt( in room at end of session)  Restraints  Initially in place: No           Patient Diagnosis(es): The encounter diagnosis was Large bowel obstruction ([facility] has a past medical history of Diverticulitis, Endometriosis, and Restless leg syndrome. has a past surgical history that includes colostomy (N/A, 2019).     Treatment Diagnosis: Large bowel obstruction      Restrictions  Restrictions/Precautions  Restrictions/Precautions: Supervision  Transfers  Stand Step Transfers: Contact guard assistance  Sit to stand: Contact guard assistance  Stand to sit: Contact guard assistance     Cognition  Overall Cognitive Status: WNL  Perception  Overall Perceptual Status: WFL     Sensation  Overall Sensation Status: WFL        LUE AROM (degrees)  LUE AROM : WFL  RUE AROM (degrees)  RUE AROM : WFL  LUE Strength  Gross LUE Strength: WFL  L Hand Grasp: 4+/5  RUE Strength  Gross RUE Strength: WFL  R Hand Grasp: 4+/5           Therapeutic Activity Training:   Therapeutic activity training was instructed today. Cues were given for safety, sequence, UE/LE placement, awareness, and balance.     Activities performed today included bed mobility training, sup-sit, sit-stand, functional mobility, stand to sit          Plan   Plan  Times per week: 2x+  Times per day: Daily  Current Treatment Recommendations: Endurance Training, Patient/Caregiver Education & Training, Positioning, Equipment Evaluation, Education, & procurement, Balance Training, Gait Training, Pain Management, Self-Care / ADL, Functional Mobility Training, Stair training, Safety Education & Training, Home Management Training             -PAC Score        [facility] Inpatient Daily Activity Raw Score: 17  AM-PAC Inpatient ADL T-Scale Score : 37.26  ADL Inpatient CMS 0-100% Score: 50.11  ADL Inpatient CMS G-Code Modifier : CK    Goals  Short term goals  Time Frame for Short term goals: discharge  Short term goal 1: Pt will perform UE bathing/dressing Independent  Short term goal 2: Pt will perform LE bathing/dressing Arabella w/ AD  Short term goal 3: Pt will perform functional transfer w/ AD Arabella  Short term goal 4: Pt will perform functional mobility to/from bathroom w/ AD Arabella  Short term goal 5: Pt will perform toileting w/AD Arabella  Patient Goals   Patient goals : go home     Time In: 1435  Time Out: 1455  Total Treatment Minutes: 10 minutes  Total Treatment Time: 20 minutes    [facility] OTR/L [id number]  4:20 PM,5/13/2019

## 2019-05-13 NOTE — PROGRESS NOTES
POD 4 Hima's procedure. AF VSS  Excellent UOP. Tolerating diet. Having some issues with flange on colostomy. Stoma nurse to see pt today. Abd is soft, colostomy is pink and edematous. Some stool in appliance now. Incision c/d/i  Lovenox. Regular diet. Check labs. Mobilize. Wean PCA.

## 2019-05-13 NOTE — CONSULTS
[facility] Contin[facility] Nurse  Consult Note       Delta Ahr  AGE: 80 y.o. GENDER: female  : 1950  TODAY'S DATE:  2019    Subjective:     Reason for [facility] Evaluation and Assessment: stoma consult      Delta Ahr is a 80 y.o. female referred by:   [x] Physician  [] Nursing  [] Other:     Wound Identification:  Wound Type: colostomy  Contributing Factors: none        PAST MEDICAL HISTORY        Diagnosis Date    Diverticulitis     Endometriosis     Restless leg syndrome        PAST SURGICAL HISTORY    Past Surgical History:   Procedure Laterality Date    COLOSTOMY N/A 2019    BOWEL RESECTION COLOSTOMY HARTMANS PROCEDURE INCIDENTAL APPENDECTOMY MULTIPLE SMALL BOWEL SEROSAL REPAIR performed by Dereck Rapp MD at [facility]    Family History   Problem Relation Age of Onset    Dementia Mother     Cancer Father     Lung Cancer Sister     Colon Cancer Sister        SOCIAL HISTORY    Social History     Tobacco Use    Smoking status: Former Smoker    Smokeless tobacco: Never Used   Substance Use Topics    Alcohol use: Not on file    Drug use: Never       ALLERGIES    No Known Allergies    MEDICATIONS    No current facility-administered medications on file prior to encounter. Current Outpatient Medications on File Prior to Encounter   Medication Sig Dispense Refill    HYDROcodone-acetaminophen (NORCO) 5-325 MG per tablet Take 1 tablet by mouth every 6 hours as needed for Pain.  rOPINIRole (REQUIP) 2 MG tablet Take 2 mg by mouth 2 times daily      amoxicillin (AMOXIL) 875 MG tablet Take 875 mg by mouth 2 times daily      atorvastatin (LIPITOR) 10 MG tablet Take 10 mg by mouth daily      triamcinolone (KENALOG) 0.1 % ointment Apply topically 2 times daily Apply topically 2 times daily.            Objective:      BP (!) 149/68   Pulse 89   Temp 98.2 °F (36.8 °C) (Oral)   Resp 16   Ht 5' 9\" (1.753 m)   Wt 211 lb 6.4 oz (95.9 kg)   SpO2 96% BMI 31.22 kg/m²   Anibal Risk Score: Anibal Scale Score: 20    LABS    CBC:   Lab Results   Component Value Date    WBC 7.0 05/13/2019    RBC 4.09 05/13/2019    HGB 10.7 05/13/2019    HCT 34.8 05/13/2019    MCV 85.1 05/13/2019    MCH 26.2 05/13/2019    MCHC 30.7 05/13/2019    RDW 15.2 05/13/2019     05/13/2019    MPV 9.4 05/13/2019     CMP:    Lab Results   Component Value Date     05/13/2019    K 4.7 05/10/2019    CL 98 05/13/2019    CO2 26 05/13/2019    BUN 8 05/13/2019    CREATININE 0.5 05/13/2019    GFRAA >60 05/13/2019    LABGLOM >60 05/13/2019    GLUCOSE 105 05/13/2019    PROT 8.1 05/08/2019    LABALBU 4.2 05/08/2019    CALCIUM 8.4 05/13/2019    BILITOT 0.5 05/08/2019    ALKPHOS 77 05/08/2019    AST 17 05/08/2019    ALT 17 05/08/2019     Albumin:    Lab Results   Component Value Date    LABALBU 4.2 05/08/2019     PT/INR:  No results found for: PROTIME, INR  HgBA1c:  No results found for: LABA1C      Assessment:     Patient Active Problem List   Diagnosis    Large bowel obstruction (HCC)    Elevated lactic acid level       Measurements:       Response to treatment:  Well tolerated by patient. Pain Assessment:  Severity:  mild  Quality of pain: tender  Wound Pain Timing/Severity: when applying any pressure to left abdomen  Premedicated: no    Plan:     Plan of Care: Incision 05/09/19 Abdomen-Dressing/Treatment: Non adherent, Dry Dressing  Noted leaking colostomy with liquid stool. Removed stoma pouching system and cleansed skin. Picture taken. Provided stoma care and educated while demonstrating. Recommend cutting oval opening for stoma instead of round to match the size and shape of stoma. Patient denies questions and is attentive. Recommended [facility] for her to assist in further education until she becomes independent. Practiced using the velcro closure. After applying a one-piece convex and a stoma belt seal was obtained.   Plan to supply literature and [employer]uan's form later today. Report given to RN. Specialty Bed Required : no, ambulatory  [] Low Air Loss   [] Pressure Redistribution  [] Fluid Immersion  [] Bariatric  [] Total Pressure Relief  [] Other:     Discharge Plan:  Placement for patient upon discharge: home  Hospice Care: no  Patient appropriate for Outpatient [address] [place] [address] Street: no    Patient/Caregiver Teaching:  Level of patient/caregiver understanding able to:   PT voices understanding of basic stoma care.        Electronically signed by Maral Bray RN, [address] [place] [address],[address] on 5/13/2019 at 11:57 AM

## 2019-05-13 NOTE — PROGRESS NOTES
Pt has remained alert and oriented. Pt ambulated in the halls today with therapy. Pt has been up and voided. Small liquid stool in colostomy bag. Colostomy bag was changed today to a one piece bag and has remained intact so far today. Pt has some pain suggested that patient use the pca and she refused for now. Daysi Eunice is within reach and pt states she will use if she needs to.

## 2019-05-14 LAB
ANION GAP SERPL CALCULATED.3IONS-SCNC: 10 MMOL/L (ref 4–16)
BUN BLDV-MCNC: 6 MG/DL (ref 6–23)
CALCIUM SERPL-MCNC: 8.1 MG/DL (ref 8.3–10.6)
CHLORIDE BLD-SCNC: 99 MMOL/L (ref 99–110)
CO2: 29 MMOL/L (ref 21–32)
CREAT SERPL-MCNC: 0.5 MG/DL (ref 0.6–1.1)
GFR AFRICAN AMERICAN: >60 ML/MIN/1.73M2
GFR NON-AFRICAN AMERICAN: >60 ML/MIN/1.73M2
GLUCOSE BLD-MCNC: 92 MG/DL (ref 70–99)
HCT VFR BLD CALC: 30.2 % (ref 37–47)
HEMOGLOBIN: 9.5 GM/DL (ref 12.5–16)
MCH RBC QN AUTO: 26.2 PG (ref 27–31)
MCHC RBC AUTO-ENTMCNC: 31.5 % (ref 32–36)
MCV RBC AUTO: 83.2 FL (ref 78–100)
PDW BLD-RTO: 15.1 % (ref 11.7–14.9)
PLATELET # BLD: 315 K/CU MM (ref 140–440)
PMV BLD AUTO: 9.5 FL (ref 7.5–11.1)
POTASSIUM SERPL-SCNC: 3.6 MMOL/L (ref 3.5–5.1)
RBC # BLD: 3.63 M/CU MM (ref 4.2–5.4)
SODIUM BLD-SCNC: 138 MMOL/L (ref 135–145)
WBC # BLD: 6.3 K/CU MM (ref 4–10.5)

## 2019-05-14 PROCEDURE — 6360000002 HC RX W HCPCS: Performed by: SURGERY

## 2019-05-14 PROCEDURE — 1200000000 HC SEMI PRIVATE

## 2019-05-14 PROCEDURE — 6370000000 HC RX 637 (ALT 250 FOR IP): Performed by: FAMILY MEDICINE

## 2019-05-14 PROCEDURE — 6370000000 HC RX 637 (ALT 250 FOR IP): Performed by: SURGERY

## 2019-05-14 PROCEDURE — 80048 BASIC METABOLIC PNL TOTAL CA: CPT

## 2019-05-14 PROCEDURE — 97530 THERAPEUTIC ACTIVITIES: CPT

## 2019-05-14 PROCEDURE — 97110 THERAPEUTIC EXERCISES: CPT

## 2019-05-14 PROCEDURE — C9113 INJ PANTOPRAZOLE SODIUM, VIA: HCPCS | Performed by: SURGERY

## 2019-05-14 PROCEDURE — 2580000003 HC RX 258: Performed by: SURGERY

## 2019-05-14 PROCEDURE — 85027 COMPLETE CBC AUTOMATED: CPT

## 2019-05-14 RX ADMIN — ONDANSETRON 4 MG: 2 INJECTION INTRAMUSCULAR; INTRAVENOUS at 01:04

## 2019-05-14 RX ADMIN — ROPINIROLE HYDROCHLORIDE 2 MG: 1 TABLET, FILM COATED ORAL at 10:09

## 2019-05-14 RX ADMIN — SODIUM CHLORIDE, PRESERVATIVE FREE 10 ML: 5 INJECTION INTRAVENOUS at 10:10

## 2019-05-14 RX ADMIN — POTASSIUM CHLORIDE 40 MEQ: 20 TABLET, EXTENDED RELEASE ORAL at 10:09

## 2019-05-14 RX ADMIN — SODIUM CHLORIDE, PRESERVATIVE FREE 10 ML: 5 INJECTION INTRAVENOUS at 20:16

## 2019-05-14 RX ADMIN — OXYCODONE HYDROCHLORIDE AND ACETAMINOPHEN 1 TABLET: 5; 325 TABLET ORAL at 23:09

## 2019-05-14 RX ADMIN — PANTOPRAZOLE SODIUM 40 MG: 40 INJECTION, POWDER, LYOPHILIZED, FOR SOLUTION INTRAVENOUS at 05:34

## 2019-05-14 RX ADMIN — ONDANSETRON 4 MG: 2 INJECTION INTRAMUSCULAR; INTRAVENOUS at 23:09

## 2019-05-14 RX ADMIN — OXYCODONE HYDROCHLORIDE AND ACETAMINOPHEN 1 TABLET: 5; 325 TABLET ORAL at 17:46

## 2019-05-14 RX ADMIN — ROPINIROLE HYDROCHLORIDE 2 MG: 1 TABLET, FILM COATED ORAL at 20:15

## 2019-05-14 RX ADMIN — ENOXAPARIN SODIUM 40 MG: 40 INJECTION SUBCUTANEOUS at 10:09

## 2019-05-14 ASSESSMENT — PAIN DESCRIPTION - LOCATION
LOCATION: ABDOMEN
LOCATION: ABDOMEN

## 2019-05-14 ASSESSMENT — PAIN SCALES - GENERAL
PAINLEVEL_OUTOF10: 5
PAINLEVEL_OUTOF10: 2
PAINLEVEL_OUTOF10: 2
PAINLEVEL_OUTOF10: 5

## 2019-05-14 ASSESSMENT — PAIN DESCRIPTION - ORIENTATION
ORIENTATION: MID;RIGHT;LEFT
ORIENTATION: LEFT;MID

## 2019-05-14 ASSESSMENT — PAIN - FUNCTIONAL ASSESSMENT
PAIN_FUNCTIONAL_ASSESSMENT: ACTIVITIES ARE NOT PREVENTED
PAIN_FUNCTIONAL_ASSESSMENT: PREVENTS OR INTERFERES SOME ACTIVE ACTIVITIES AND ADLS

## 2019-05-14 ASSESSMENT — PAIN DESCRIPTION - PROGRESSION
CLINICAL_PROGRESSION: GRADUALLY WORSENING
CLINICAL_PROGRESSION: GRADUALLY WORSENING

## 2019-05-14 ASSESSMENT — PAIN DESCRIPTION - DESCRIPTORS
DESCRIPTORS: ACHING;DISCOMFORT
DESCRIPTORS: ACHING

## 2019-05-14 ASSESSMENT — PAIN DESCRIPTION - ONSET
ONSET: ON-GOING
ONSET: ON-GOING

## 2019-05-14 ASSESSMENT — PAIN DESCRIPTION - FREQUENCY
FREQUENCY: CONTINUOUS
FREQUENCY: INTERMITTENT

## 2019-05-14 ASSESSMENT — PAIN DESCRIPTION - PAIN TYPE
TYPE: SURGICAL PAIN
TYPE: SURGICAL PAIN

## 2019-05-14 NOTE — CARE COORDINATION
Spoke with pt and she is hoping to return home with [address]. PS to Nancy Young, requesting she speak with d/t she has questions about home care. CM will continue to follow.

## 2019-05-14 NOTE — PROGRESS NOTES
POD 5 Hima's procedure. AF VSS  Excellent UOP. Voiding after echols out. Had some nausea yesterday. Better now. Abd is soft, colostomy is pink and edematous. Some stool in appliance. Incision c/d/i  Lovenox. Regular diet. Mobilize. HLIV. Remove PCA. ?  Discharge tomorrow.

## 2019-05-14 NOTE — PROGRESS NOTES
Hospitalist Progress Note      Name:  Natalie Carrera /Age/Sex: 1950  (80 y.o. female)   MRN & CSN:  [id number] & [id number] Admission Date/Time: 2019  1:19 PM   Location:  [address] [place] [address] PCP: No primary care provider on file. Natalie Carrera is a 80 y.o.  female  who presents with Abdominal Pain (patient states she is having a diverticulitis flare; NV)      Assessment and Plan:   Large Bowel Obstruction, Clinical Diverticulitis, Appendicitis  - s/p Bowel resection colostomy, incidental appendectomy, multiple small bowel serosal tear repairs POD#5  - tolerated gen diet now  - IVF stopped  - NGT out   - dilaudid PCA off  - zosyn stopped   - lovenox   - surgery following  - ambulate    D/c tomorrow                   Diet DIET GENERAL;   Code Status Full Code     Medications:   Medications:    pantoprazole  40 mg Intravenous Daily    enoxaparin  40 mg Subcutaneous Daily    lidocaine   Topical Once    sodium chloride flush  10 mL Intravenous 2 times per day    rOPINIRole  2 mg Oral BID      Infusions:   PRN Meds:   oxyCODONE-acetaminophen 1 tablet Q4H PRN   HYDROmorphone 1 mg Q3H PRN   promethazine 12.5 mg Q6H PRN   LORazepam 1 mg Q6H PRN   sodium chloride flush 10 mL PRN   sodium chloride flush 10 mL PRN   ondansetron 4 mg Q6H PRN     Subjective:   Doing well    Objective: Intake/Output Summary (Last 24 hours) at 2019 1023  Last data filed at 2019 1010  Gross per 24 hour   Intake 533 ml   Output 1675 ml   Net -1142 ml      Vitals:   Vitals:    19 0450   BP: 139/82   Pulse: 89   Resp: 18   Temp: 98.5 °F (36.9 °C)   SpO2: 99%     Physical Exam:   Gen:  awake, alert, cooperative, no apparent distress  Head/Eyes:  Normocephalic atraumatic, EOMI   NECK:   symmetrical, trachea midline  LUNGS: Normal Effort   CARDIOVASCULAR:  Normal rate  ABDOMEN: Non tender, non distended, no HSM noted.   MUSCULOSKELETAL:  ROM WNL  NEUROLOGIC: Alert and Oriented,  Cranial nerves II-XII are grossly intact.    SKIN:  no bruising or bleeding, normal skin color,  no redness      Data:       CBC   Recent Labs     05/13/19  1055 05/14/19  0500   WBC 7.0 6.3   HGB 10.7* 9.5*   HCT 34.8* 30.2*    315      BMP Recent Labs     05/13/19  1055 05/14/19  0500    138   K 3.0  K CALLED TO IDALMIS HERNANDEZ ON 05/13/2019 @ 1212 BY SHEN GALVEZ   RESULTS READ BACK  * 3.6   CL 98* 99   CO2 26 29   BUN 8 6   CREATININE 0.5* 0.5*         Electronically signed by Tobey Merlin, MD on 5/14/2019 at 10:23 AM

## 2019-05-14 NOTE — PROGRESS NOTES
Occupational Therapy    Occupational Therapy Treatment Note  Name: Amilcar Guerin MRN: [id number] :   1950   Date:  2019   Admission Date: 2019 Room:  [address] [place], [place] [address]   Restrictions/Precautions:  Restrictions/Precautions  Restrictions/Precautions: General Precautions, Fall Risk     Communication with other providers:   Cleared for treatment by King Arnulfo RN. Subjective:  Patient states:  Pt reports \"I would like to go on a walk\". Pain:   Location, Type, Intensity (0/10 to 10/10):  5/10 abdomen    Objective:    Observation:  Pt alert and oriented. Treatment, including education: For BUE strengthening for ADL & functional mobility Indep pt performed BUE strengthening HEP using Medium strength theraband  X 7 exer for R/L UE x 15 reps c minimal rest breaks needed. Pt completed functional mobility with no AD x 50' x 2 with SBA with noted SOB and required mod rest breaks throughout session with nursing notified at end of treatment session. Assessment / Impression:        Patient's tolerance of treatment: Good   Adverse Reaction: None  Significant change in status and impact:  None  Barriers to improvement:  None    Plan for Next Session:    Continue with POC.     Time in:  1435  Time out: 1515  Timed treatment minutes:  40  Total treatment time:  40  Electronically signed by:    Rakan Reyes, [address]     2019, 3:21 PM    Previously filed values:  Patient Goals   Patient goals : go home  Short term goals  Time Frame for Short term goals: discharge  Short term goal 1: Pt will perform UE bathing/dressing Independent  Short term goal 2: Pt will perform LE bathing/dressing Arabella w/ AD  Short term goal 3: Pt will perform functional transfer w/ AD Arabella  Short term goal 4: Pt will perform functional mobility to/from bathroom w/ AD Arabella  Short term goal 5: Pt will perform toileting w/AD Arabella

## 2019-05-15 VITALS
DIASTOLIC BLOOD PRESSURE: 61 MMHG | TEMPERATURE: 98.4 F | HEART RATE: 88 BPM | WEIGHT: 205.2 LBS | HEIGHT: 69 IN | OXYGEN SATURATION: 96 % | SYSTOLIC BLOOD PRESSURE: 135 MMHG | BODY MASS INDEX: 30.39 KG/M2 | RESPIRATION RATE: 16 BRPM

## 2019-05-15 PROCEDURE — 6360000002 HC RX W HCPCS: Performed by: SURGERY

## 2019-05-15 PROCEDURE — 6370000000 HC RX 637 (ALT 250 FOR IP): Performed by: SURGERY

## 2019-05-15 PROCEDURE — 2580000003 HC RX 258: Performed by: SURGERY

## 2019-05-15 PROCEDURE — C9113 INJ PANTOPRAZOLE SODIUM, VIA: HCPCS | Performed by: SURGERY

## 2019-05-15 PROCEDURE — 99213 OFFICE O/P EST LOW 20 MIN: CPT

## 2019-05-15 RX ORDER — OXYCODONE HYDROCHLORIDE AND ACETAMINOPHEN 5; 325 MG/1; MG/1
1 TABLET ORAL EVERY 6 HOURS PRN
Qty: 10 TABLET | Refills: 0 | Status: SHIPPED | OUTPATIENT
Start: 2019-05-15 | End: 2019-05-18

## 2019-05-15 RX ADMIN — SODIUM CHLORIDE, PRESERVATIVE FREE 10 ML: 5 INJECTION INTRAVENOUS at 10:02

## 2019-05-15 RX ADMIN — PANTOPRAZOLE SODIUM 40 MG: 40 INJECTION, POWDER, LYOPHILIZED, FOR SOLUTION INTRAVENOUS at 05:28

## 2019-05-15 RX ADMIN — ENOXAPARIN SODIUM 40 MG: 40 INJECTION SUBCUTANEOUS at 09:57

## 2019-05-15 RX ADMIN — ROPINIROLE HYDROCHLORIDE 2 MG: 1 TABLET, FILM COATED ORAL at 09:57

## 2019-05-15 ASSESSMENT — PAIN SCALES - GENERAL: PAINLEVEL_OUTOF10: 0

## 2019-05-15 NOTE — CONSULTS
[facility] Continence Nurse  Consult Note       Marsa Peabody  AGE: 80 y.o. GENDER: female  : 1950  TODAY'S DATE:  5/15/2019    Subjective:     Reason for [facility] Evaluation and Assessment: stoma consult and education      Marsa Peabody is a 80 y.o. female referred by:   [x] Physician  [] Nursing  [] Other:     Wound Identification:  Wound Type: colostomy  Contributing Factors: none        PAST MEDICAL HISTORY        Diagnosis Date    Diverticulitis     Endometriosis     Restless leg syndrome        PAST SURGICAL HISTORY    Past Surgical History:   Procedure Laterality Date    COLOSTOMY N/A 2019    BOWEL RESECTION COLOSTOMY HARTMANS PROCEDURE INCIDENTAL APPENDECTOMY MULTIPLE SMALL BOWEL SEROSAL REPAIR performed by Shahid Nash MD at [facility]    Family History   Problem Relation Age of Onset    Dementia Mother     Cancer Father     Lung Cancer Sister     Colon Cancer Sister        SOCIAL HISTORY    Social History     Tobacco Use    Smoking status: Former Smoker    Smokeless tobacco: Never Used   Substance Use Topics    Alcohol use: Not on file    Drug use: Never       ALLERGIES    No Known Allergies    MEDICATIONS    No current facility-administered medications on file prior to encounter. Current Outpatient Medications on File Prior to Encounter   Medication Sig Dispense Refill    rOPINIRole (REQUIP) 2 MG tablet Take 2 mg by mouth 2 times daily      atorvastatin (LIPITOR) 10 MG tablet Take 10 mg by mouth daily      triamcinolone (KENALOG) 0.1 % ointment Apply topically 2 times daily Apply topically 2 times daily.            Objective:      /61   Pulse 88   Temp 98.4 °F (36.9 °C) (Oral)   Resp 16   Ht 5' 9\" (1.753 m)   Wt 205 lb 3.2 oz (93.1 kg)   SpO2 96%   BMI 30.30 kg/m²   Anibal Risk Score: Anibal Scale Score: 20    LABS    CBC:   Lab Results   Component Value Date    WBC 6.3 2019    RBC 3.63 2019    HGB 9.5 2019 including powder, 1-piece pouches, barrier extenders, and she is wearing a belt. Leo Secure Start kit should be arriving at her home by early next week. She has my contact # as well as Leo's # and booklets. She is moderately confident that she will be able to become independent with stoma care. Report given to RN, Jared Pan. Karuna ambulates in room and mcguire is denies skin problems. Her Anibal is 20. Specialty Bed Required : no  [] Low Air Loss   [] Pressure Redistribution  [] Fluid Immersion  [] Bariatric  [] Total Pressure Relief  [] Other:     Discharge Plan:  Placement for patient upon discharge: home  Hospice Care: no  Patient appropriate for Outpatient [address] [place] [address] Street: no    Patient/Caregiver Teaching:  Level of patient/caregiver understanding able to:   Pt voices understanding of basic stoma care.        Electronically signed by Zaid Dominguez RN, [address] [place] [address],[address] on 5/15/2019 at 10:24 AM

## 2019-05-15 NOTE — PROGRESS NOTES
POD 6 Hima's procedure. AF VSS  Pathology revealed:  Final Pathologic Diagnosis:  A.  Appendix, appendectomy:  -  Focal mild acute appendicitis. Vernette Pates, sigmoid, resection:  -  Marked diverticulitis with surrounding inflammatory  reaction and fibrosis.    -  Focal benign lymphoid aggregate is noted. -  One benign reactive lymph node is identified. -  Negative for dysplasia or malignancy.    I did an incidental appendectomy at the time of the colon resection due to marked dilation of the appendix secondary to the LBO. No perforation or acute inflammation changes were present intra-operatively. Excellent UOP. Voiding after echols out. Had some nausea yesterday. Better now. Abd is soft, colostomy is pink and edematous. Some stool in appliance. Incision c/d/i  Discharge today. F/U in one week in my office.

## 2019-05-15 NOTE — DISCHARGE SUMMARY
[facility] 1950 [id number]  PCP:  No primary care provider on file. Admit date: 5/8/2019  Admitting Physician: Kendell Barber MD    Discharge date: 5/15/2019 Discharge Physician: Kendell Barber MD         Hospital Course and Discharge Diagnoses Include:    Large Bowel Obstruction, Clinical Diverticulitis, Appendicitis  - s/p Bowel resection colostomy, incidental appendectomy, multiple small bowel serosal tear repairs POD#6  - tolerated gen diet now  - IVF stopped  - NGT out   - dilaudid PCA off  - zosyn stopped   - lovenox   - surgery following  - ambulated well    Final Pathologic Diagnosis:  A.  Appendix, appendectomy:  -  Focal mild acute appendicitis. Dollene Milder, sigmoid, resection:  -  Marked diverticulitis with surrounding inflammatory  reaction and fibrosis.    -  Focal benign lymphoid aggregate is noted. -  One benign reactive lymph node is identified. -  Negative for dysplasia or malignancy.                           Physical Exam on Discharge date: 05/15/19  Gen:  awake, alert, cooperative, no apparent distress  Head/Eyes:  Normocephalic atraumatic, EOMI   NECK:   symmetrical, trachea midline  LUNGS: Normal Effort   CARDIOVASCULAR:  Normal rate  ABDOMEN: Non tender, non distended, no HSM noted. MUSCULOSKELETAL:  ROM WNL  NEUROLOGIC: Alert and Oriented,  Cranial nerves II-XII are grossly intact. SKIN:  no bruising or bleeding, normal skin color,  no redness    Procedures:  See above  Ct Abdomen Pelvis W Iv Contrast    Result Date: 5/8/2019  EXAMINATION: CT OF THE ABDOMEN AND PELVIS WITH CONTRAST 5/8/2019 2:52 pm TECHNIQUE: CT of the abdomen and pelvis was performed with the administration of intravenous contrast. Multiplanar reformatted images are provided for review. Dose modulation, iterative reconstruction, and/or weight based adjustment of the mA/kV was utilized to reduce the radiation dose to as low as reasonably achievable.  COMPARISON: None HISTORY: [address] PROVIDED HISTORY: ABDOMINAL PAIN TECHNOLOGIST PROVIDED HISTORY: IV contrast only. Thank you. Ordering Physician Provided Reason for Exam: Abdominal pain; Diverticulitis Acuity: Unknown Type of Exam: Unknown Additional signs and symptoms: PT STATES PAIN X A MONTH Relevant Medical/Surgical History: 75 ML ISOVUE 370 FINDINGS: Lower Chest: Visualized lung bases appear clear. No pleural effusions. Large hiatal hernia. Organs: Benign liver cyst versus hemangioma to the left lobe of the liver measuring 8 mm in size. No follow-up imaging recommended. Liver otherwise appears unremarkable. Spleen, pancreas, gallbladder and adrenal glands are unremarkable. Bilobed low-attenuation partially exophytic focus to the upper lobe of the left kidney with attenuation characteristics in the range of fluid compatible with benign renal cyst measuring 2.0 x 1.0 cm. There are also two smaller low-attenuation foci noted to the lower pole of the left kidney which are too small to further characterize but compatible with benign renal cysts as well. No follow up imaging of any of these foci recommended. Left kidney is otherwise unremarkable. There is a low-attenuation focus noted to the upper pole of the right kidney with attenuation characteristics higher than fluid. This measures 1.7 x 1.6 cm. Right kidney otherwise appears unremarkable. GI/Bowel: Fluid and air-filled distention of multiple small bowel loops involving jejunum through terminal ileum. There is also fluid in air-filled distention of large bowel to level of proximal sigmoid colon. Distal to this the sigmoid colon is collapsed. Underlying wall thickening not excluded. There may be a few scattered colonic diverticula but no diverticula appeared to be particularly inflamed. Appendix is prominent measuring up to 9 mm in caliber but without periappendiceal inflammatory change or wall thickening of the appendix. Pelvis: Urinary bladder, uterus and adnexa appear unremarkable. cm.  No free air or pneumatosis. 1. Nasogastric tube is located within the hiatal hernia. 2. Gas distended loops of small bowel measuring up to 4.5 cm. Findings are compatible with the patient's known distal obstruction. Significant Diagnostic Studies at discharge:   CBC:   Lab Results   Component Value Date    WBC 6.3 05/14/2019    RBC 3.63 05/14/2019    HGB 9.5 05/14/2019    HCT 30.2 05/14/2019    MCV 83.2 05/14/2019    MCH 26.2 05/14/2019    MCHC 31.5 05/14/2019    RDW 15.1 05/14/2019     05/14/2019    MPV 9.5 05/14/2019       Patient Instructions:     Medication List      START taking these medications    oxyCODONE-acetaminophen 5-325 MG per tablet  Commonly known as:  PERCOCET  Take 1 tablet by mouth every 6 hours as needed for Pain for up to 3 days. CONTINUE taking these medications    atorvastatin 10 MG tablet  Commonly known as:  LIPITOR     rOPINIRole 2 MG tablet  Commonly known as:  REQUIP     triamcinolone 0.1 % ointment  Commonly known as:  KENALOG        STOP taking these medications    amoxicillin 875 MG tablet  Commonly known as:  AMOXIL     HYDROcodone-acetaminophen 5-325 MG per tablet  Commonly known as:  [facility]           Where to Get Your Medications      You can get these medications from any pharmacy    Bring a paper prescription for each of these medications  · oxyCODONE-acetaminophen 5-325 MG per tablet            Code Status: Full Code     Consults:   IP CONSULT TO HOSPITALIST  IP CONSULT TO GENERAL SURGERY  IP CONSULT TO SOCIAL WORK  IP CONSULT TO HOME CARE NEEDS    Diet: regular diet    Activity: activity as tolerated   Work:    Discharged Condition: good    Prognosis: Fair - Good    Disposition: home      Follow-up with   1. PCP within   5-7    Days    Follow up labs: none       Discharge Physician Signed: Telma Esteban M.D.     The patient was seen and examined on day of discharge and this discharge summary is in conjunction with any daily progress note from day of discharge.   Time spent on discharge in the examination, evaluation, counseling and review of medications and discharge plan: 34 minutes

## 2019-05-15 NOTE — PROGRESS NOTES
[facility] Liaison aware of discharge & will initiate [facility].

## 2019-05-16 LAB
EKG ATRIAL RATE: 87 BPM
EKG DIAGNOSIS: NORMAL
EKG P AXIS: 36 DEGREES
EKG P-R INTERVAL: 142 MS
EKG Q-T INTERVAL: 370 MS
EKG QRS DURATION: 74 MS
EKG QTC CALCULATION (BAZETT): 445 MS
EKG R AXIS: 54 DEGREES
EKG T AXIS: 50 DEGREES
EKG VENTRICULAR RATE: 87 BPM

## 2019-05-23 PROBLEM — G89.18 POST-OP PAIN: Status: ACTIVE | Noted: 2019-05-23

## 2019-05-23 PROBLEM — Z09 S/P COLOSTOMY, FOLLOW-UP EXAM: Status: ACTIVE | Noted: 2019-05-23

## 2019-06-22 PROBLEM — Z09 S/P COLOSTOMY, FOLLOW-UP EXAM: Status: RESOLVED | Noted: 2019-05-23 | Resolved: 2019-06-22

## 2019-07-27 PROBLEM — I87.2 CHRONIC VENOUS INSUFFICIENCY: Status: ACTIVE | Noted: 2019-07-27

## 2019-08-06 PROBLEM — I83.893 VARICOSE VEINS OF LOWER EXTREMITIES WITH COMPLICATIONS, BILATERAL: Status: ACTIVE | Noted: 2019-08-06

## 2019-08-06 PROBLEM — Z09 S/P COLOSTOMY, FOLLOW-UP EXAM: Status: RESOLVED | Noted: 2019-05-23 | Resolved: 2019-08-06

## 2019-09-19 ENCOUNTER — HOSPITAL ENCOUNTER (OUTPATIENT)
Age: 69
Setting detail: SPECIMEN
Discharge: HOME | End: 2019-09-19

## 2019-09-19 LAB
ABO/RH: NORMAL
ANTIBODY SCREEN: NEGATIVE
COMMENT: NORMAL

## 2019-09-19 PROCEDURE — 86850 RBC ANTIBODY SCREEN: CPT

## 2019-09-19 PROCEDURE — 86900 BLOOD TYPING SEROLOGIC ABO: CPT

## 2019-09-19 PROCEDURE — 86901 BLOOD TYPING SEROLOGIC RH(D): CPT

## 2019-10-03 ENCOUNTER — TELEPHONE (OUTPATIENT)
Dept: CARDIOLOGY CLINIC | Age: 69
End: 2019-10-03

## 2019-10-04 ENCOUNTER — NURSE ONLY (OUTPATIENT)
Dept: CARDIOLOGY CLINIC | Age: 69
End: 2019-10-04
Payer: MEDICARE

## 2019-10-04 ENCOUNTER — INITIAL CONSULT (OUTPATIENT)
Dept: CARDIOLOGY CLINIC | Age: 69
End: 2019-10-04
Payer: MEDICARE

## 2019-10-04 VITALS
HEART RATE: 88 BPM | DIASTOLIC BLOOD PRESSURE: 70 MMHG | WEIGHT: 203 LBS | BODY MASS INDEX: 30.07 KG/M2 | SYSTOLIC BLOOD PRESSURE: 118 MMHG | HEIGHT: 69 IN

## 2019-10-04 DIAGNOSIS — Z01.810 PRE-OPERATIVE CARDIOVASCULAR EXAMINATION: ICD-10-CM

## 2019-10-04 DIAGNOSIS — R06.02 SOB (SHORTNESS OF BREATH): ICD-10-CM

## 2019-10-04 DIAGNOSIS — R00.1 BRADYCARDIA: Primary | ICD-10-CM

## 2019-10-04 PROCEDURE — 93225 XTRNL ECG REC<48 HRS REC: CPT | Performed by: INTERNAL MEDICINE

## 2019-10-04 PROCEDURE — G8427 DOCREV CUR MEDS BY ELIG CLIN: HCPCS | Performed by: INTERNAL MEDICINE

## 2019-10-04 PROCEDURE — 99204 OFFICE O/P NEW MOD 45 MIN: CPT | Performed by: INTERNAL MEDICINE

## 2019-10-04 PROCEDURE — 1090F PRES/ABSN URINE INCON ASSESS: CPT | Performed by: INTERNAL MEDICINE

## 2019-10-04 PROCEDURE — G8419 CALC BMI OUT NRM PARAM NOF/U: HCPCS | Performed by: INTERNAL MEDICINE

## 2019-10-04 PROCEDURE — G8484 FLU IMMUNIZE NO ADMIN: HCPCS | Performed by: INTERNAL MEDICINE

## 2019-10-07 ENCOUNTER — HOSPITAL ENCOUNTER (OUTPATIENT)
Age: 69
Discharge: HOME | End: 2019-10-07
Payer: MEDICARE

## 2019-10-07 DIAGNOSIS — I49.9 CARDIAC ARRHYTHMIA, UNSPECIFIED CARDIAC ARRHYTHMIA TYPE: Primary | ICD-10-CM

## 2019-10-07 DIAGNOSIS — R00.1 BRADYCARDIA: ICD-10-CM

## 2019-10-07 LAB
MAGNESIUM: 2.1 MG/DL (ref 1.8–2.4)
TSH HIGH SENSITIVITY: 1.54 UIU/ML (ref 0.27–4.2)

## 2019-10-07 PROCEDURE — 36415 COLL VENOUS BLD VENIPUNCTURE: CPT

## 2019-10-07 PROCEDURE — 83735 ASSAY OF MAGNESIUM: CPT

## 2019-10-07 PROCEDURE — 84443 ASSAY THYROID STIM HORMONE: CPT

## 2019-10-09 ENCOUNTER — TELEPHONE (OUTPATIENT)
Dept: CARDIOLOGY CLINIC | Age: 69
End: 2019-10-09

## 2019-10-17 ENCOUNTER — PROCEDURE VISIT (OUTPATIENT)
Dept: CARDIOLOGY CLINIC | Age: 69
End: 2019-10-17
Payer: MEDICARE

## 2019-10-17 VITALS
DIASTOLIC BLOOD PRESSURE: 72 MMHG | HEIGHT: 69 IN | WEIGHT: 203 LBS | HEART RATE: 92 BPM | SYSTOLIC BLOOD PRESSURE: 132 MMHG | BODY MASS INDEX: 30.07 KG/M2

## 2019-10-17 DIAGNOSIS — R06.02 SOB (SHORTNESS OF BREATH): ICD-10-CM

## 2019-10-17 DIAGNOSIS — R00.1 BRADYCARDIA: Primary | ICD-10-CM

## 2019-10-17 DIAGNOSIS — R94.31 ABNORMAL EKG: ICD-10-CM

## 2019-10-17 DIAGNOSIS — Z01.810 PRE-OPERATIVE CARDIOVASCULAR EXAMINATION: ICD-10-CM

## 2019-10-17 LAB
LV EF: 58 %
LVEF MODALITY: NORMAL

## 2019-10-17 PROCEDURE — 93015 CV STRESS TEST SUPVJ I&R: CPT | Performed by: INTERNAL MEDICINE

## 2019-10-17 PROCEDURE — 93306 TTE W/DOPPLER COMPLETE: CPT | Performed by: INTERNAL MEDICINE

## 2019-10-18 ENCOUNTER — TELEPHONE (OUTPATIENT)
Dept: CARDIOLOGY CLINIC | Age: 69
End: 2019-10-18

## 2019-10-18 PROCEDURE — 93227 XTRNL ECG REC<48 HR R&I: CPT | Performed by: INTERNAL MEDICINE

## 2019-10-21 ENCOUNTER — TELEPHONE (OUTPATIENT)
Dept: CARDIOLOGY CLINIC | Age: 69
End: 2019-10-21

## 2019-10-21 PROBLEM — Z98.890 STATUS POST ENDOVENOUS RADIOFREQUENCY ABLATION (RFA) OF SAPHENOUS VEIN: Status: ACTIVE | Noted: 2019-10-21

## 2019-11-03 PROBLEM — Z01.810 PRE-OPERATIVE CARDIOVASCULAR EXAMINATION: Status: RESOLVED | Noted: 2019-10-04 | Resolved: 2019-11-03

## 2019-11-04 ENCOUNTER — TELEPHONE (OUTPATIENT)
Dept: CARDIOLOGY CLINIC | Age: 69
End: 2019-11-04

## 2019-11-14 ENCOUNTER — TELEPHONE (OUTPATIENT)
Dept: CARDIOLOGY CLINIC | Age: 69
End: 2019-11-14

## 2019-11-19 ENCOUNTER — TELEPHONE (OUTPATIENT)
Dept: CARDIOLOGY CLINIC | Age: 69
End: 2019-11-19

## 2019-11-29 ENCOUNTER — TELEPHONE (OUTPATIENT)
Dept: CARDIOLOGY CLINIC | Age: 69
End: 2019-11-29

## 2019-12-02 PROBLEM — Z98.890 S/P COLOSTOMY TAKEDOWN: Status: ACTIVE | Noted: 2019-12-02

## 2020-02-10 ENCOUNTER — OFFICE VISIT (OUTPATIENT)
Dept: CARDIOLOGY CLINIC | Age: 70
End: 2020-02-10
Payer: MEDICARE

## 2020-02-10 VITALS
SYSTOLIC BLOOD PRESSURE: 110 MMHG | HEIGHT: 69 IN | BODY MASS INDEX: 32.2 KG/M2 | DIASTOLIC BLOOD PRESSURE: 70 MMHG | WEIGHT: 217.4 LBS | HEART RATE: 80 BPM

## 2020-02-10 PROCEDURE — G8417 CALC BMI ABV UP PARAM F/U: HCPCS | Performed by: INTERNAL MEDICINE

## 2020-02-10 PROCEDURE — G8484 FLU IMMUNIZE NO ADMIN: HCPCS | Performed by: INTERNAL MEDICINE

## 2020-02-10 PROCEDURE — G8427 DOCREV CUR MEDS BY ELIG CLIN: HCPCS | Performed by: INTERNAL MEDICINE

## 2020-02-10 PROCEDURE — 1090F PRES/ABSN URINE INCON ASSESS: CPT | Performed by: INTERNAL MEDICINE

## 2020-02-10 PROCEDURE — 1123F ACP DISCUSS/DSCN MKR DOCD: CPT | Performed by: INTERNAL MEDICINE

## 2020-02-10 PROCEDURE — 1036F TOBACCO NON-USER: CPT | Performed by: INTERNAL MEDICINE

## 2020-02-10 PROCEDURE — G8400 PT W/DXA NO RESULTS DOC: HCPCS | Performed by: INTERNAL MEDICINE

## 2020-02-10 PROCEDURE — 99213 OFFICE O/P EST LOW 20 MIN: CPT | Performed by: INTERNAL MEDICINE

## 2020-02-10 PROCEDURE — 3017F COLORECTAL CA SCREEN DOC REV: CPT | Performed by: INTERNAL MEDICINE

## 2020-02-10 PROCEDURE — 4040F PNEUMOC VAC/ADMIN/RCVD: CPT | Performed by: INTERNAL MEDICINE

## 2020-02-10 NOTE — PROGRESS NOTES
of cardiovascular stress test 10/17/2019    Fair Exercise Tolerance. No chest pain noted during testing.  Hx of Doppler echocardiogram 10/17/2019    Normal left ventricle structure and systolic function with an ejection fraction of 55-60%. Grade I diastolic dysfunction. Sclerotic, but non-stenotic aortic valve. Mild aortic regurgitation with a pressure half time of 823. Normal pulmonary artery pressure. No evidence of pericardial effusion.  Hyperlipidemia     Pre-op evaluation 10/2019    PVC's (premature ventricular contractions)     SOB (shortness of breath)      Past Surgical History:   Procedure Laterality Date    COLOSTOMY N/A 5/9/2019    BOWEL RESECTION COLOSTOMY HARTMANS PROCEDURE INCIDENTAL APPENDECTOMY MULTIPLE SMALL BOWEL SEROSAL REPAIR [facility]       Family History   Problem Relation Age of Onset    Dementia Mother     Cancer Father     Lung Cancer Sister     Colon Cancer Sister      Social History     Tobacco Use    Smoking status: Former Smoker    Smokeless tobacco: Never Used   Substance Use Topics    Alcohol use: Yes     Comment: once a week        Review of Systems:   · Constitutional: No Fever or Weight Loss   · Eyes: No Decreased Vision  · ENT: No Headaches, Hearing Loss or Vertigo  · Cardiovascular: as per note above   · Respiratory: No cough or wheezing and as per note above.    · Gastrointestinal: No abdominal pain, appetite loss, blood in stools, constipation, diarrhea or heartburn  · Genitourinary: No dysuria, trouble voiding, or hematuria  · Musculoskeletal:  denies any new  joint aches , swelling  or pain   · Integumentary: No rash or pruritis  · Neurological: No TIA or stroke symptoms  · Psychiatric: No anxiety or depression  · Endocrine: No malaise, fatigue or temperature intolerance  · Hematologic/Lymphatic: No bleeding problems, blood clots or swollen lymph nodes  · Allergic/Immunologic: No nasal

## 2020-02-10 NOTE — LETTER
CLINICAL STAFF DOCUMENTATION    Zayda Long  1950  [id number]    Have you had any Chest Pain - No      Have you had any Shortness of Breath - Yes  If Yes - When on exertion    Have you had any dizziness - No      Have you had any palpitations - No        Do you have any edema - swelling in legs    If Yes - CHECK TO SEE IF THE EDEMA IS PITTING  How long have they been having edema - Years      Do you have a surgery or procedure scheduled in the near future - No

## 2020-02-11 ENCOUNTER — TELEPHONE (OUTPATIENT)
Dept: CARDIOLOGY CLINIC | Age: 70
End: 2020-02-11

## 2020-02-20 ENCOUNTER — TELEPHONE (OUTPATIENT)
Dept: CARDIOLOGY CLINIC | Age: 70
End: 2020-02-20

## 2020-02-20 NOTE — TELEPHONE ENCOUNTER
Referral was sent to [facility] office on 2/11/20; placed call to Dr. Lasalle Gosselin office, spoke with Arcelia Reyna who advised that the referral has not yet been received. Faxed referral, demographics, ins card, and office note to Dr. PARSON [facility] office at Fax# [phone].

## 2020-02-24 ENCOUNTER — TELEPHONE (OUTPATIENT)
Dept: CARDIOLOGY CLINIC | Age: 70
End: 2020-02-24

## 2020-02-24 NOTE — TELEPHONE ENCOUNTER
Placed call to [facility] office requesting confirmation on whether referral has been received and if patient has been scheduled; spoke with Farheen Her who advised that the referral has not been received. Per Farheen Her, they have been having trouble with their fax machine, referral faxed again to a different fax# [phone]. Left message with patient advising of referral status.

## 2020-02-24 NOTE — TELEPHONE ENCOUNTER
Patient called stating she called Dr. Blackman Reasoner office regarding her referral and they have not received anything.  You can reach her on her home number

## 2020-02-25 ENCOUNTER — TELEPHONE (OUTPATIENT)
Dept: CARDIOLOGY CLINIC | Age: 70
End: 2020-02-25

## 2020-03-06 ENCOUNTER — TELEPHONE (OUTPATIENT)
Dept: CARDIOLOGY CLINIC | Age: 70
End: 2020-03-06

## 2020-09-20 PROBLEM — K62.5 PAINLESS RECTAL BLEEDING: Status: ACTIVE | Noted: 2020-09-20

## 2020-11-05 NOTE — TELEPHONE ENCOUNTER
90 days with refills next appt 2/21.   She stated that Babs Freire has tried to   Contact us nothing in [address]

## 2020-12-08 ENCOUNTER — APPOINTMENT (OUTPATIENT)
Dept: CT IMAGING | Age: 70
End: 2020-12-08
Payer: MEDICARE

## 2020-12-08 ENCOUNTER — HOSPITAL ENCOUNTER (EMERGENCY)
Age: 70
Discharge: HOME | End: 2020-12-08
Payer: MEDICARE

## 2020-12-08 VITALS
SYSTOLIC BLOOD PRESSURE: 111 MMHG | HEART RATE: 73 BPM | BODY MASS INDEX: 32.73 KG/M2 | HEIGHT: 69 IN | TEMPERATURE: 98.4 F | RESPIRATION RATE: 17 BRPM | OXYGEN SATURATION: 99 % | WEIGHT: 221 LBS | DIASTOLIC BLOOD PRESSURE: 58 MMHG

## 2020-12-08 LAB
ABO/RH: NORMAL
ADENOVIRUS F 40 41 PCR: NOT DETECTED
ALBUMIN SERPL-MCNC: 3.7 GM/DL (ref 3.4–5)
ALP BLD-CCNC: 87 IU/L (ref 40–129)
ALT SERPL-CCNC: 16 U/L (ref 10–40)
ANION GAP SERPL CALCULATED.3IONS-SCNC: 11 MMOL/L (ref 4–16)
ANTIBODY SCREEN: NEGATIVE
AST SERPL-CCNC: 20 IU/L (ref 15–37)
ASTROVIRUS PCR: NOT DETECTED
BACTERIA: ABNORMAL /HPF
BASOPHILS ABSOLUTE: 0.1 K/CU MM
BASOPHILS RELATIVE PERCENT: 0.7 % (ref 0–1)
BILIRUB SERPL-MCNC: 0.3 MG/DL (ref 0–1)
BILIRUBIN URINE: NEGATIVE MG/DL
BLOOD, URINE: NEGATIVE
BUN BLDV-MCNC: 7 MG/DL (ref 6–23)
CALCIUM SERPL-MCNC: 8.7 MG/DL (ref 8.3–10.6)
CAMPYLOBACTER PCR: NOT DETECTED
CHLORIDE BLD-SCNC: 101 MMOL/L (ref 99–110)
CLARITY: CLEAR
CO2: 25 MMOL/L (ref 21–32)
COLOR: YELLOW
CREAT SERPL-MCNC: 0.8 MG/DL (ref 0.6–1.1)
CRYPTOSPORIDIUM PCR: NOT DETECTED
CYCLOSPORA CAYETANENSIS PCR: NOT DETECTED
DIFFERENTIAL TYPE: ABNORMAL
E COLI 0157 PCR: NOT DETECTED
E COLI ENTEROAGGREGATIVE PCR: NOT DETECTED
E COLI ENTEROPATHOGENIC PCR: NOT DETECTED
E COLI ENTEROTOXIGENIC PCR: NOT DETECTED
E COLI SHIGA LIKE TOXIN PCR: NOT DETECTED
E COLI SHIGELLA/ENTEROINVASIVE PCR: NOT DETECTED
ENTAMOEBA HISTOLYTICA PCR: NOT DETECTED
EOSINOPHILS ABSOLUTE: 0.2 K/CU MM
EOSINOPHILS RELATIVE PERCENT: 2.1 % (ref 0–3)
GFR AFRICAN AMERICAN: >60 ML/MIN/1.73M2
GFR NON-AFRICAN AMERICAN: >60 ML/MIN/1.73M2
GIARDIA LAMBLIA PCR: NOT DETECTED
GLUCOSE BLD-MCNC: 103 MG/DL (ref 70–99)
GLUCOSE, URINE: NEGATIVE MG/DL
HCT VFR BLD CALC: 31.4 % (ref 37–47)
HEMOGLOBIN: 9.6 GM/DL (ref 12.5–16)
IMMATURE NEUTROPHIL %: 0.6 % (ref 0–0.43)
KETONES, URINE: NEGATIVE MG/DL
LEUKOCYTE ESTERASE, URINE: ABNORMAL
LYMPHOCYTES ABSOLUTE: 1.4 K/CU MM
LYMPHOCYTES RELATIVE PERCENT: 19.7 % (ref 24–44)
MCH RBC QN AUTO: 25.5 PG (ref 27–31)
MCHC RBC AUTO-ENTMCNC: 30.6 % (ref 32–36)
MCV RBC AUTO: 83.3 FL (ref 78–100)
MONOCYTES ABSOLUTE: 0.5 K/CU MM
MONOCYTES RELATIVE PERCENT: 7.7 % (ref 0–4)
NITRITE URINE, QUANTITATIVE: NEGATIVE
NOROVIRUS GI GII PCR: NOT DETECTED
NUCLEATED RBC %: 0 %
PDW BLD-RTO: 13.5 % (ref 11.7–14.9)
PH, URINE: 7 (ref 5–8)
PLATELET # BLD: 380 K/CU MM (ref 140–440)
PLESIOMONAS SHIGELLOIDES PCR: NOT DETECTED
PMV BLD AUTO: 9.3 FL (ref 7.5–11.1)
POTASSIUM SERPL-SCNC: 4 MMOL/L (ref 3.5–5.1)
PROTEIN UA: NEGATIVE MG/DL
RBC # BLD: 3.77 M/CU MM (ref 4.2–5.4)
RBC URINE: 1 /HPF (ref 0–6)
REASON FOR REJECTION: NORMAL
REJECTED TEST: NORMAL
ROTAVIRUS A PCR: NOT DETECTED
SALMONELLA PCR: NOT DETECTED
SAPOVIRUS PCR: NOT DETECTED
SEGMENTED NEUTROPHILS ABSOLUTE COUNT: 4.9 K/CU MM
SEGMENTED NEUTROPHILS RELATIVE PERCENT: 69.2 % (ref 36–66)
SODIUM BLD-SCNC: 137 MMOL/L (ref 135–145)
SPECIFIC GRAVITY UA: 1.03 (ref 1–1.03)
SQUAMOUS EPITHELIAL: 1 /HPF
TOTAL IMMATURE NEUTOROPHIL: 0.04 K/CU MM
TOTAL NUCLEATED RBC: 0 K/CU MM
TOTAL PROTEIN: 7.1 GM/DL (ref 6.4–8.2)
TRICHOMONAS: ABNORMAL /HPF
UROBILINOGEN, URINE: NORMAL MG/DL (ref 0.2–1)
VIBRIO CHOLERAE PCR: NOT DETECTED
VIBRIO PCR: NOT DETECTED
WBC # BLD: 7.1 K/CU MM (ref 4–10.5)
WBC UA: 1 /HPF (ref 0–5)
YERSINIA ENTEROCOLITICA PCR: NOT DETECTED

## 2020-12-08 PROCEDURE — 85025 COMPLETE CBC W/AUTO DIFF WBC: CPT

## 2020-12-08 PROCEDURE — 86850 RBC ANTIBODY SCREEN: CPT

## 2020-12-08 PROCEDURE — 99285 EMERGENCY DEPT VISIT HI MDM: CPT

## 2020-12-08 PROCEDURE — 36415 COLL VENOUS BLD VENIPUNCTURE: CPT

## 2020-12-08 PROCEDURE — 80053 COMPREHEN METABOLIC PANEL: CPT

## 2020-12-08 PROCEDURE — 87324 CLOSTRIDIUM AG IA: CPT

## 2020-12-08 PROCEDURE — 81001 URINALYSIS AUTO W/SCOPE: CPT

## 2020-12-08 PROCEDURE — 96374 THER/PROPH/DIAG INJ IV PUSH: CPT

## 2020-12-08 PROCEDURE — 86901 BLOOD TYPING SEROLOGIC RH(D): CPT

## 2020-12-08 PROCEDURE — C9113 INJ PANTOPRAZOLE SODIUM, VIA: HCPCS | Performed by: EMERGENCY MEDICINE

## 2020-12-08 PROCEDURE — 74177 CT ABD & PELVIS W/CONTRAST: CPT

## 2020-12-08 PROCEDURE — 6360000002 HC RX W HCPCS: Performed by: PHYSICIAN ASSISTANT

## 2020-12-08 PROCEDURE — 6360000004 HC RX CONTRAST MEDICATION: Performed by: EMERGENCY MEDICINE

## 2020-12-08 PROCEDURE — 87507 IADNA-DNA/RNA PROBE TQ 12-25: CPT

## 2020-12-08 PROCEDURE — 94761 N-INVAS EAR/PLS OXIMETRY MLT: CPT

## 2020-12-08 PROCEDURE — 2580000003 HC RX 258: Performed by: EMERGENCY MEDICINE

## 2020-12-08 PROCEDURE — 96375 TX/PRO/DX INJ NEW DRUG ADDON: CPT

## 2020-12-08 PROCEDURE — 86900 BLOOD TYPING SEROLOGIC ABO: CPT

## 2020-12-08 PROCEDURE — 6360000002 HC RX W HCPCS: Performed by: EMERGENCY MEDICINE

## 2020-12-08 RX ORDER — ONDANSETRON 4 MG/1
4 TABLET, ORALLY DISINTEGRATING ORAL EVERY 6 HOURS
Qty: 10 TABLET | Refills: 0 | Status: SHIPPED | OUTPATIENT
Start: 2020-12-08 | End: 2021-12-17

## 2020-12-08 RX ORDER — PANTOPRAZOLE SODIUM 40 MG/10ML
80 INJECTION, POWDER, LYOPHILIZED, FOR SOLUTION INTRAVENOUS ONCE
Status: COMPLETED | OUTPATIENT
Start: 2020-12-08 | End: 2020-12-08

## 2020-12-08 RX ORDER — CIPROFLOXACIN 500 MG/1
500 TABLET, FILM COATED ORAL 2 TIMES DAILY
Qty: 20 TABLET | Refills: 0 | Status: SHIPPED | OUTPATIENT
Start: 2020-12-08 | End: 2020-12-18

## 2020-12-08 RX ORDER — ONDANSETRON 2 MG/ML
4 INJECTION INTRAMUSCULAR; INTRAVENOUS ONCE
Status: COMPLETED | OUTPATIENT
Start: 2020-12-08 | End: 2020-12-08

## 2020-12-08 RX ORDER — METRONIDAZOLE 500 MG/1
500 TABLET ORAL 3 TIMES DAILY
Qty: 30 TABLET | Refills: 0 | Status: SHIPPED | OUTPATIENT
Start: 2020-12-08 | End: 2020-12-18

## 2020-12-08 RX ORDER — 0.9 % SODIUM CHLORIDE 0.9 %
1000 INTRAVENOUS SOLUTION INTRAVENOUS ONCE
Status: COMPLETED | OUTPATIENT
Start: 2020-12-08 | End: 2020-12-08

## 2020-12-08 RX ADMIN — ONDANSETRON 4 MG: 2 INJECTION INTRAMUSCULAR; INTRAVENOUS at 12:10

## 2020-12-08 RX ADMIN — IOPAMIDOL 75 ML: 755 INJECTION, SOLUTION INTRAVENOUS at 10:58

## 2020-12-08 RX ADMIN — SODIUM CHLORIDE 1000 ML: 9 INJECTION, SOLUTION INTRAVENOUS at 10:19

## 2020-12-08 RX ADMIN — PANTOPRAZOLE SODIUM 80 MG: 40 INJECTION, POWDER, FOR SOLUTION INTRAVENOUS at 10:19

## 2020-12-08 ASSESSMENT — PAIN DESCRIPTION - PAIN TYPE: TYPE: ACUTE PAIN

## 2020-12-08 ASSESSMENT — PAIN DESCRIPTION - LOCATION: LOCATION: ABDOMEN

## 2020-12-08 ASSESSMENT — PAIN DESCRIPTION - DESCRIPTORS: DESCRIPTORS: CRAMPING

## 2020-12-08 NOTE — ED PROVIDER NOTES
Jason STEEL Ashley 89 ENCOUNTER      PCP: Lena Bernard MD    [address] [facility]    Chief Complaint   Patient presents with    Abdominal Pain    Rectal Bleeding     for 6 months    Diarrhea     This patient was not evaluated by the attending physician. I have independently evaluated this patient. HPI    Addy Ku is a 72 y.o. female who presents with lower abdominal cramping pain, bright red blood in stool. Onset approximately 6 months ago. Patient states she does have history of hemorrhoids. Patient has history of bowel resection, colostomy and then colostomy reversal with Dr. Anaid Araya. Patient states she is had diarrhea for the past 2 months. Patient denies recent antibiotic use, contacts with similar symptoms or recent travel. Patient denies fever, chest pain, shortness of breath, lightheadedness, urinary symptoms. Patient was recommended to come to emergency department by gastroenterologist Dr. Bradley Mauro office. REVIEW OF SYSTEMS    Constitutional:  Denies fever  HENT:  Denies sore throat or ear pain   Cardiovascular:  Denies chest pain  Respiratory:  Denies cough or shortness of breath   GI:  See HPI above  : No hematuria or dysuria. Musculoskeletal:  Denies back pain   Skin:  Denies rash  Neurologic:  Denies focal weakness or sensory changes   Lymphatic:  Denies swollen glands     All other review of systems are negative  See HPI and nursing notes for additional information     PAST MEDICAL & SURGICAL HISTORY    Past Medical History:   Diagnosis Date    Abnormal EKG     Bradycardia     Edema leg     Family history of coronary artery disease     Former smoker     History of cardiac monitoring 10/18/2019    48 hr monitor, Short runs of SVT, few PAC and PVC noted, no significant arrhythmias, no significant pauses.  Hx of cardiovascular stress test 10/17/2019    Fair Exercise Tolerance. No chest pain noted during testing.     Hx of Doppler echocardiogram 10/17/2019    Normal left ventricle structure and systolic function with an ejection fraction of 55-60%. Grade I diastolic dysfunction. Sclerotic, but non-stenotic aortic valve. Mild aortic regurgitation with a pressure half time of 823. Normal pulmonary artery pressure. No evidence of pericardial effusion.  Hyperlipidemia     Pre-op evaluation 10/2019    PVC's (premature ventricular contractions)     SOB (shortness of breath)      Past Surgical History:   Procedure Laterality Date    COLOSTOMY N/A 5/9/2019    BOWEL RESECTION COLOSTOMY HARTMANS PROCEDURE INCIDENTAL APPENDECTOMY MULTIPLE SMALL BOWEL SEROSAL REPAIR performed by Karen Keys MD at [facility] 284         CURRENT MEDICATIONS    Current Outpatient Rx   Medication Sig Dispense Refill    ciprofloxacin (CIPRO) 500 MG tablet Take 1 tablet by mouth 2 times daily for 10 days 20 tablet 0    metroNIDAZOLE (FLAGYL) 500 MG tablet Take 1 tablet by mouth 3 times daily for 10 days 30 tablet 0    ondansetron (ZOFRAN ODT) 4 MG disintegrating tablet Take 1 tablet by mouth every 6 hours 10 tablet 0    metoprolol tartrate (LOPRESSOR) 25 MG tablet Take 1 tablet by mouth 2 times daily 180 tablet 3    gabapentin (NEURONTIN) 300 MG capsule Take 300 mg by mouth.       triamterene-hydroCHLOROthiazide (DYAZIDE) 37.5-25 MG per capsule Take 1 capsule by mouth daily as needed      cimetidine (TAGAMET) 300 MG tablet TAKE 1 TABLET BY MOUTH AT BEDTIME      Misc Natural Products (T-RELIEF CBD+13) SUBL Place under the tongue      HYDROcodone-acetaminophen (NORCO) 5-325 MG per tablet       Handicap Placard MISC by Does not apply route 1 each 0    rOPINIRole (REQUIP) 2 MG tablet Take 2 mg by mouth 2 times daily      atorvastatin (LIPITOR) 10 MG tablet Take 10 mg by mouth daily         ALLERGIES    No Known Allergies    SOCIAL AND FAMILY HISTORY    Social History     Socioeconomic History    Marital status:      Spouse name: None    Number of children: None    Years of education: None    Highest education level: None   Occupational History    None   Social Needs    Financial resource strain: None    Food insecurity     Worry: None     Inability: None    Transportation needs     Medical: None     Non-medical: None   Tobacco Use    Smoking status: Former Smoker    Smokeless tobacco: Never Used   Substance and Sexual Activity    Alcohol use: Yes     Comment: once a week    Drug use: Never    Sexual activity: None   Lifestyle    Physical activity     Days per week: None     Minutes per session: None    Stress: None   Relationships    Social connections     Talks on phone: None     Gets together: None     Attends [employer] service: None     Active member of club or organization: None     Attends meetings of clubs or organizations: None     Relationship status: None    Intimate partner violence     Fear of current or ex partner: None     Emotionally abused: None     Physically abused: None     Forced sexual activity: None   Other Topics Concern    None   Social History Narrative    None     Family History   Problem Relation Age of Onset    Dementia Mother     Cancer Father     Lung Cancer Sister     Colon Cancer Sister        PHYSICAL EXAM    VITAL SIGNS: BP (!) 111/58   Pulse 73   Temp 98.4 °F (36.9 °C) (Oral)   Resp 17   Ht 5' 9\" (1.753 m)   Wt 221 lb (100.2 kg)   SpO2 99%   BMI 32.64 kg/m²   Constitutional:  Well developed, well nourished  Eyes:  Sclera nonicteric, conjunctiva moist  HENT:  Atraumatic. PERRL. EOMI.  moist mucus membranes. Neck/Lymphatics: supple, no JVD, no swollen nodes  Respiratory:  No retractions, no accessory muscle use, normal breath sounds   Cardiovascular: normal rate, normal rhythm, no murmurs    GI:     No gross discoloration.       -no Uzair's sign (periumbilical ecchymosis)       -no Grey-Malik's sign (flank ecchymosis)    Bowel sounds present, no audible bruits.  Soft,  no guarding,   no abdominal tenderness, no rebound, no palpable pulsatile masses  Back:  No CVA tenderness to percussion.   Musculoskeletal:  No edema, no deformity  Integument: No rash, dry skin  Neurologic:  Alert & oriented, normal speech  Psychiatric: Cooperative, pleasant affect       LABS:  Results for orders placed or performed during the hospital encounter of 12/08/20   CBC Auto Differential   Result Value Ref Range    WBC 7.1 4.0 - 10.5 K/CU MM    RBC 3.77 (L) 4.2 - 5.4 M/CU MM    Hemoglobin 9.6 (L) 12.5 - 16.0 GM/DL    Hematocrit 31.4 (L) 37 - 47 %    MCV 83.3 78 - 100 FL    MCH 25.5 (L) 27 - 31 PG    MCHC 30.6 (L) 32.0 - 36.0 %    RDW 13.5 11.7 - 14.9 %    Platelets 533 401 - 734 K/CU MM    MPV 9.3 7.5 - 11.1 FL    Differential Type AUTOMATED DIFFERENTIAL     Segs Relative 69.2 (H) 36 - 66 %    Lymphocytes % 19.7 (L) 24 - 44 %    Monocytes % 7.7 (H) 0 - 4 %    Eosinophils % 2.1 0 - 3 %    Basophils % 0.7 0 - 1 %    Segs Absolute 4.9 K/CU MM    Lymphocytes Absolute 1.4 K/CU MM    Monocytes Absolute 0.5 K/CU MM    Eosinophils Absolute 0.2 K/CU MM    Basophils Absolute 0.1 K/CU MM    Nucleated RBC % 0.0 %    Total Nucleated RBC 0.0 K/CU MM    Total Immature Neutrophil 0.04 K/CU MM    Immature Neutrophil % 0.6 (H) 0 - 0.43 %   Comprehensive Metabolic Panel w/ Reflex to MG   Result Value Ref Range    Sodium 137 135 - 145 MMOL/L    Potassium 4.0 3.5 - 5.1 MMOL/L    Chloride 101 99 - 110 mMol/L    CO2 25 21 - 32 MMOL/L    BUN 7 6 - 23 MG/DL    CREATININE 0.8 0.6 - 1.1 MG/DL    Glucose 103 (H) 70 - 99 MG/DL    Calcium 8.7 8.3 - 10.6 MG/DL    Alb 3.7 3.4 - 5.0 GM/DL    Total Protein 7.1 6.4 - 8.2 GM/DL    Total Bilirubin 0.3 0.0 - 1.0 MG/DL    ALT 16 10 - 40 U/L    AST 20 15 - 37 IU/L    Alkaline Phosphatase 87 40 - 129 IU/L    GFR Non-African American >60 >60 mL/min/1.73m2    GFR African American >60 >60 mL/min/1.73m2    Anion Gap 11 4 - 16   Urinalysis   Result Value Ref Range    Color, UA YELLOW YELLOW    Clarity, UA CLEAR CLEAR stable. I consulted patient's gastroenterologist Dr. Shamika Goodwin who agrees with outpatient treatment, does request patient be started on Cipro and Flagyl. Stool studies were sent while patient was in the emergency department and gastroenterology will follow. Patient provided Zofran. I recommend follow-up with gastroenterologist with primary care provider in 2 days for recheck. Clinical  IMPRESSION    1. Blood in stool    2. Diarrhea, unspecified type    3. Colitis    4. Kidney lesion          I discussed the importance return to the emergency department immediately if new or worsening symptoms develop. Comment: Please note this report has been produced using speech recognition software and may contain errors related to that system including errors in grammar, punctuation, and spelling, as well as words and phrases that may be inappropriate. If there are any questions or concerns please feel free to contact the dictating provider for clarification.       Herbert Cowden, PA-C  12/08/20 1257

## 2020-12-08 NOTE — ED NOTES
DR Roberto Carlos Edwards ANSWERED     Florencio Poplar  Smita Burnett  12/08/20 2489 ROOM # 16  Identified pt with two pt identifiers(name and ). Reviewed record in preparation for visit and have obtained necessary documentation. Chief Complaint   Patient presents with    Hypertension      Boston Varma preferred language for health care discussion is english/other. Is the patient using any DME equipment during OV? Elder Rocher is due for:  Health Maintenance Due   Topic    Shingrix Vaccine Age 50> (1 of 2)    GLAUCOMA SCREENING Q2Y     Influenza Age 5 to Adult      Health Maintenance reviewed and discussed per provider  Please order/place referral if appropriate. Advance Directive:  1. Do you have an advance directive in place? Patient Reply: NO    2. If not, would you like material regarding how to put one in place? NO    Coordination of Care:  1. Have you been to the ER, urgent care clinic since your last visit? Hospitalized since your last visit? NO    2. Have you seen or consulted any other health care providers outside of the [address] [place] [address] since your last visit? Include any pap smears or colon screening. YES    Patient is accompanied by self I have received verbal consent from Boston Varma to discuss any/all medical information while they are present in the room.     Learning Assessment:  Learning Assessment 2013   PRIMARY LEARNER Patient Patient Patient   HIGHEST LEVEL OF EDUCATION - PRIMARY LEARNER  GRADUATED HIGH SCHOOL OR GED GRADUATED HIGH SCHOOL OR GED -   BARRIERS PRIMARY LEARNER NONE NONE -   PRIMARY LANGUAGE ENGLISH ENGLISH ENGLISH    NEED - No -   LEARNER PREFERENCE PRIMARY DEMONSTRATION DEMONSTRATION READING   LEARNING SPECIAL TOPICS - no -   ANSWERED BY patient patient patient   RELATIONSHIP SELF SELF SELF     Depression Screening:  3 most recent [facility] Screens 2018 2018 2017 11/10/2017 8/10/2017 2017 2017   Little interest or pleasure in doing things Not at all Not at all Not at all Several days Not at all Not at all Not at all   Feeling down, depressed, irritable, or hopeless Not at all Not at all Not at all Several days Not at all Not at all Not at all   Total Score PHQ 2 0 0 0 2 0 0 0     Abuse Screening:  Abuse Screening Questionnaire 5/9/2017 1/28/2016 4/9/2015   Do you ever feel afraid of your partner? N N N   Are you in a relationship with someone who physically or mentally threatens you? N N N   Is it safe for you to go home? Pillow Pert     Fall Risk  Fall Risk Assessment, last 12 mths 2/25/2019 11/26/2018 8/21/2018 5/14/2018 12/1/2017 11/10/2017 8/10/2017   Able to walk? Yes Yes Yes Yes Yes Yes Yes   Fall in past 12 months?  No No No No No No No   Fall with injury? - - - - - - -   Number of falls in past 12 months - - - - - - -   Fall Risk Score - - - - - - - poor historian Dementia

## 2020-12-08 NOTE — ED NOTES
Discharge instructions reviewed with pt. All questions answered at this time. Pt verbalized understanding.       Luis Rojo RN  12/08/20 1379

## 2020-12-08 NOTE — ED PROVIDER NOTES
As physician-in-triage, I performed a medical screening history and physical exam on this patient. CHIEF COMPLAINT  Chief Complaint   Patient presents with    Abdominal Pain    Rectal Bleeding     for 6 months    Diarrhea       HISTORY OF PRESENT ILLNESS  Gretchen Mcnamara is a 72 y.o. female that presents to the emergency department for evaluation of abdominal pain. She localizes the pain to left lower quadrant of the abdomen. Patient notes that May 2020, she had diverticulitis with perforation. She underwent a colostomy. She had a colostomy reversal in November 2020. Since then, she has had on and off abdominal pain. She describes a dull, achy pain. In addition, patient notes that for the past 6 months, she has had rectal bleeding. Symptoms have progressively worsened over the past couple days. She now has bright red blood per rectum. She states that she notices bleeding every time she has a bowel movement. She has had diarrhea in the recent past and bleeding has become worse. Denies any recent steroid use, NSAID use, antibiotic use. She does not take any oral anticoagulants or antiplatelets. Denies nausea, vomiting, hematemesis, coffee-ground emesis. Denies fevers, chills, diaphoresis. Denies any weight loss. She has been able to tolerate p.o. food and fluids. Denies syncope, dizziness, lightheadedness. Denies chest pain or pleuritic pain. Denies additional precipitating, modifying, alleviating features. PHYSICAL EXAM  /68   Pulse 97   Temp 98.4 °F (36.9 °C) (Oral)   Resp 16   Ht 5' 9\" (1.753 m)   Wt 221 lb (100.2 kg)   SpO2 98%   BMI 32.64 kg/m²     On exam, the patient appears in no acute distress. Speech is clear. Breathing is unlabored.   Moves all extremities    Comment: Please note this report has been produced using speech recognition software and may contain errors related to that system including errors in grammar, punctuation, and spelling, as well as words and phrases that may be inappropriate. If there are any questions or concerns please feel free to contact the dictating provider for clarification.        [address] [facility],   12/08/20 0396

## 2021-02-23 ENCOUNTER — OFFICE VISIT (OUTPATIENT)
Dept: CARDIOLOGY CLINIC | Age: 71
End: 2021-02-23
Payer: MEDICARE

## 2021-02-23 VITALS
DIASTOLIC BLOOD PRESSURE: 84 MMHG | BODY MASS INDEX: 33.33 KG/M2 | HEART RATE: 78 BPM | HEIGHT: 69 IN | SYSTOLIC BLOOD PRESSURE: 122 MMHG | WEIGHT: 225 LBS

## 2021-02-23 DIAGNOSIS — R94.31 ABNORMAL EKG: Primary | ICD-10-CM

## 2021-02-23 DIAGNOSIS — R00.1 BRADYCARDIA: ICD-10-CM

## 2021-02-23 DIAGNOSIS — Z98.890 STATUS POST ENDOVENOUS RADIOFREQUENCY ABLATION (RFA) OF SAPHENOUS VEIN: ICD-10-CM

## 2021-02-23 DIAGNOSIS — I87.2 CHRONIC VENOUS INSUFFICIENCY: ICD-10-CM

## 2021-02-23 DIAGNOSIS — R06.02 SOB (SHORTNESS OF BREATH): ICD-10-CM

## 2021-02-23 PROCEDURE — G8400 PT W/DXA NO RESULTS DOC: HCPCS | Performed by: INTERNAL MEDICINE

## 2021-02-23 PROCEDURE — 99213 OFFICE O/P EST LOW 20 MIN: CPT | Performed by: INTERNAL MEDICINE

## 2021-02-23 PROCEDURE — G8484 FLU IMMUNIZE NO ADMIN: HCPCS | Performed by: INTERNAL MEDICINE

## 2021-02-23 PROCEDURE — 1123F ACP DISCUSS/DSCN MKR DOCD: CPT | Performed by: INTERNAL MEDICINE

## 2021-02-23 PROCEDURE — 4040F PNEUMOC VAC/ADMIN/RCVD: CPT | Performed by: INTERNAL MEDICINE

## 2021-02-23 PROCEDURE — G8427 DOCREV CUR MEDS BY ELIG CLIN: HCPCS | Performed by: INTERNAL MEDICINE

## 2021-02-23 PROCEDURE — 1090F PRES/ABSN URINE INCON ASSESS: CPT | Performed by: INTERNAL MEDICINE

## 2021-02-23 PROCEDURE — 3017F COLORECTAL CA SCREEN DOC REV: CPT | Performed by: INTERNAL MEDICINE

## 2021-02-23 PROCEDURE — 1036F TOBACCO NON-USER: CPT | Performed by: INTERNAL MEDICINE

## 2021-02-23 PROCEDURE — G8417 CALC BMI ABV UP PARAM F/U: HCPCS | Performed by: INTERNAL MEDICINE

## 2021-02-23 RX ORDER — LANSOPRAZOLE 30 MG/1
CAPSULE, DELAYED RELEASE ORAL
COMMUNITY
Start: 2021-02-15

## 2021-02-23 RX ORDER — FERROUS SULFATE 325(65) MG
325 TABLET ORAL
COMMUNITY

## 2021-02-23 NOTE — PROGRESS NOTES
CARDIOLOGY NOTE    Chief Complaint: SOB      HPI:   Heidi Rodriguez is a 72y.o. year old who has Past medical history as noted below. She has colitis and having a lot of diarrhea, She is struggling with RLS and Colitsi making her life very difficult. She has had colitis for almost 1 year now ,Sees Dr Pamela Maloney . She is on Iron now   She gets dizzy and feels unbalanced when walking . Dr Kirstin De Dios did colostomy reveresed and also did leg venous ablation . She has put on 25 lbs since her last visit   She is having a lot of tremors and jerking of legs , she saw  neurology . She is exhausted by just doing laundry and walking    She does report shortness of breath she does not walk much due to shortness of breath she is a strong family history of early coronary artery disease. She had colostomy reversal which has helped She is on Requip    As such she denies any chest pain but she does not do much. Current Outpatient Medications   Medication Sig Dispense Refill    lansoprazole (PREVACID) 30 MG delayed release capsule TAKE 1 CAPSULE BY MOUTH ONCE DAILY BEFORE A MEAL      ferrous sulfate (IRON 325) 325 (65 Fe) MG tablet Take 325 mg by mouth 3 times daily (with meals)      Cholecalciferol (VITAMIN D3) 1.25 MG (76082 UT) TABS Take by mouth      ondansetron (ZOFRAN ODT) 4 MG disintegrating tablet Take 1 tablet by mouth every 6 hours 10 tablet 0    metoprolol tartrate (LOPRESSOR) 25 MG tablet Take 1 tablet by mouth 2 times daily 180 tablet 3    gabapentin (NEURONTIN) 300 MG capsule Take 300 mg by mouth.       triamterene-hydroCHLOROthiazide (DYAZIDE) 37.5-25 MG per capsule Take 1 capsule by mouth daily as needed      Misc Natural Products (T-RELIEF CBD+13) SUBL Place under the tongue      Handicap Placard MISC by Does not apply route 1 each 0    rOPINIRole (REQUIP) 2 MG tablet Take 2 mg by mouth 2 times daily      atorvastatin (LIPITOR) 10 MG tablet Take 10 mg by mouth daily       No current facility-administered medications for this visit. Allergies:   Patient has no known allergies. Patient History:  Past Medical History:   Diagnosis Date    Abnormal EKG     Bradycardia     Edema leg     Family history of coronary artery disease     Former smoker     History of cardiac monitoring 10/18/2019    48 hr monitor, Short runs of SVT, few PAC and PVC noted, no significant arrhythmias, no significant pauses.  Hx of cardiovascular stress test 10/17/2019    Fair Exercise Tolerance. No chest pain noted during testing.  Hx of Doppler echocardiogram 10/17/2019    Normal left ventricle structure and systolic function with an ejection fraction of 55-60%. Grade I diastolic dysfunction. Sclerotic, but non-stenotic aortic valve. Mild aortic regurgitation with a pressure half time of 823. Normal pulmonary artery pressure. No evidence of pericardial effusion.  Hyperlipidemia     Pre-op evaluation 10/2019    PVC's (premature ventricular contractions)     SOB (shortness of breath)      Past Surgical History:   Procedure Laterality Date    COLOSTOMY N/A 5/9/2019    BOWEL RESECTION COLOSTOMY HARTMANS PROCEDURE INCIDENTAL APPENDECTOMY MULTIPLE SMALL BOWEL SEROSAL REPAIR performed by Cynthia Hartman MD at [facility] 284       Family History   Problem Relation Age of Onset    Dementia Mother     Cancer Father     Lung Cancer Sister     Colon Cancer Sister      Social History     Tobacco Use    Smoking status: Former Smoker    Smokeless tobacco: Never Used   Substance Use Topics    Alcohol use: Yes     Comment: once a week        Review of Systems:   · Constitutional: No Fever or Weight Loss   · Eyes: No Decreased Vision  · ENT: No Headaches, Hearing Loss or Vertigo  · Cardiovascular: as per note above   · Respiratory: No cough or wheezing and as per note above.    · Gastrointestinal: No abdominal pain, appetite loss, blood in stools, constipation, diarrhea or heartburn  · Genitourinary: No dysuria, trouble voiding, or hematuria  · Musculoskeletal:  denies any new  joint aches , swelling  or pain   · Integumentary: No rash or pruritis  · Neurological: No TIA or stroke symptoms  · Psychiatric: No anxiety or depression  · Endocrine: No malaise, fatigue or temperature intolerance  · Hematologic/Lymphatic: No bleeding problems, blood clots or swollen lymph nodes  · Allergic/Immunologic: No nasal congestion or hives    Objective:      Physical Exam:  /84   Pulse 78   Ht 5' 9\" (1.753 m)   Wt 225 lb (102.1 kg)   BMI 33.23 kg/m²   Wt Readings from Last 3 Encounters:   02/23/21 225 lb (102.1 kg)   12/08/20 221 lb (100.2 kg)   09/18/20 222 lb (100.7 kg)     Body mass index is 33.23 kg/m². Vitals:    02/23/21 1209   BP: 122/84   Pulse: 78        General Appearance:  No distress, conversant  Constitutional:  Well developed, Well nourished, No acute distress, Non-toxic appearance. HENT:  Normocephalic, Atraumatic, Bilateral external ears normal, Oropharynx moist, No oral exudates, Nose normal. Neck- Normal range of motion, No tenderness, Supple, No stridor,no apical-carotid delay  Eyes:  PERRL, EOMI, Conjunctiva normal, No discharge. Respiratory:  Normal breath sounds, No respiratory distress, No wheezing, No chest tenderness. ,no use of accessory muscles, NO crackles  Cardiovascular: (PMI) apex non displaced,no lifts no thrills,S1 and S2 audible, No added heart sounds, 1+ ankle edema, or volume overload, No evidence of JVD, No crackles  GI: There is post colostomy bowel sounds normal, Soft, No tenderness, No masses, No gross visceromegaly   :  No costovertebral angle tenderness   Musculoskeletal: 1+ edema, no tenderness, no deformities.  Back- no tenderness  Integument:  Well hydrated, no rash   Lymphatic:  No lymphadenopathy noted   Neurologic:  Alert & oriented x 3, CN 2-12 normal, normal motor function, normal sensory function, no focal deficits noted Psychiatric:  Speech and behavior appropriate       Medical decision making and Data review:  DATA:  No results found for: TROPONINT  BNP:  No results found for: PROBNP  PT/INR:  No results found for: PTINR  No results found for: LABA1C  No results found for: CHOL, TRIG, HDL, LDLCALC, LDLDIRECT  Lab Results   Component Value Date    ALT 16 12/08/2020    AST 20 12/08/2020     No results for input(s): WBC, HGB, HCT, MCV, PLT in the last 72 hours. TSH: No results found for: TSH  Lab Results   Component Value Date    AST 20 12/08/2020    ALT 16 12/08/2020    BILITOT 0.3 12/08/2020    ALKPHOS 87 12/08/2020     No results found for: PROBNP  No results found for: LABA1C  Lab Results   Component Value Date    WBC 7.1 12/08/2020    HGB 9.6 (L) 12/08/2020    HCT 31.4 (L) 12/08/2020     12/08/2020     Stress test  10/17/19   Stress Type: Exercise      Peak HR: 131 bpm                       HR Response: Appropriate   Peak BP: 158/80 mmHg                   BP Response: Normal resting BP -   Predicted HR: 152 bpm                  appropriate response   % of predicted HR: 86                  HR BP Product: [id number]   Exercise Duration: 04:00 min           Max Exercise: 5.8 METS   Reason for Termination: Target heart   rate                                   Exercise Effort: Fair     Echo 10/17/19   Summary   Normal left ventricle structure and systolic function with an ejection   fraction of 55-60%. Grade I diastolic dysfunction. Sclerotic, but non-stenotic aortic valve. Mild aortic regurgitation with a pressure half time of 823. Normal pulmonary artery pressure. No evidence of pericardial effusion. All labs, medications and tests reviewed by myself including data and history from outside source , patient and available family . Assessment & Plan:      1. Abnormal EKG    2. Bradycardia    3. Chronic venous insufficiency    4. SOB (shortness of breath)    5.  Status post endovenous radiofrequency ablation (RFA) of saphenous vein         Restless leg and tremors  Related to RLS     Hypertension  If bp is low stop or lower metoprolol , she is asking for handicap placard , will give her due to dizziness     SOB (shortness of breath)  She has lower extremity swelling and shortness of breath but echo is normal , leg venous ablation was done by Dr Fanta García      Dyslipidemia :  All available lab work was reviewed. Patient was advised to repeat lab work before next visit. Necessary orders were placed , instructions given by myself       Counseled extensively and medication compliance urged. We discussed that for the  prevention of ASCVD our  goal is aggressive risk modification. Patient is encouraged to exercise even a brisk walk for 30 minutes  at least 3 to 4 times a week   Various goals were discussed and questions answered. Continue current medications. Appropriate prescriptions are addressed and refills ordered. Questions answered and patient verbalizes understanding. Call for any problems, questions, or concerns. Continue all other medications of all above medical condition listed as is. Return in about 1 year (around 2/23/2022). Please note this report has been partially produced using speech recognition software and may contain errors related to that system including errors in grammar, punctuation, and spelling, as well as words and phrases that may be inappropriate. If there are any questions or concerns please feel free to contact the dictating provider for clarification.

## 2021-02-23 NOTE — PATIENT INSTRUCTIONS
Please be informed that if you contact our office outside of normal business hours the physician on call cannot help with any scheduling or rescheduling issues, procedure instruction questions or any type of medication issue. We advise you for any urgent/emergency that you go to the nearest emergency room!     PLEASE CALL OUR OFFICE DURING NORMAL BUSINESS HOURS    Monday - Friday   8 am to 5 pm    [place]: Natalie 12: [phone]    [place]:  [phone]

## 2021-02-23 NOTE — LETTER
Dr. Сергей Gates  1950  [id number]    Have you had any Chest Pain that is not new? - no     Have you had any Shortness of Breath - Yes  If Yes - When on exertion    Have you had any dizziness - No  Have you had any palpitations that are not new?  - Yes  If Yes DO EKG - Do you feel your heart fluttering  How long does it last - .2  seconds       Do you have any edema - swelling in yes   Do you have a surgery or procedure scheduled in the near future - No

## 2021-10-04 ENCOUNTER — HOSPITAL ENCOUNTER (EMERGENCY)
Age: 71
Discharge: HOME | End: 2021-10-04
Attending: EMERGENCY MEDICINE
Payer: MEDICARE

## 2021-10-04 VITALS
WEIGHT: 223 LBS | HEART RATE: 84 BPM | HEIGHT: 69 IN | SYSTOLIC BLOOD PRESSURE: 111 MMHG | RESPIRATION RATE: 24 BRPM | TEMPERATURE: 98.6 F | BODY MASS INDEX: 33.03 KG/M2 | DIASTOLIC BLOOD PRESSURE: 72 MMHG | OXYGEN SATURATION: 99 %

## 2021-10-04 DIAGNOSIS — R19.7 DIARRHEA, UNSPECIFIED TYPE: Primary | ICD-10-CM

## 2021-10-04 DIAGNOSIS — R53.83 FATIGUE, UNSPECIFIED TYPE: ICD-10-CM

## 2021-10-04 LAB
ALBUMIN SERPL-MCNC: 4 GM/DL (ref 3.4–5)
ALP BLD-CCNC: 95 IU/L (ref 40–129)
ALT SERPL-CCNC: 22 U/L (ref 10–40)
ANION GAP SERPL CALCULATED.3IONS-SCNC: 11 MMOL/L (ref 4–16)
AST SERPL-CCNC: 30 IU/L (ref 15–37)
BASOPHILS ABSOLUTE: 0.1 K/CU MM
BASOPHILS RELATIVE PERCENT: 1 % (ref 0–1)
BILIRUB SERPL-MCNC: 0.3 MG/DL (ref 0–1)
BUN BLDV-MCNC: 12 MG/DL (ref 6–23)
CALCIUM SERPL-MCNC: 8.7 MG/DL (ref 8.3–10.6)
CHLORIDE BLD-SCNC: 99 MMOL/L (ref 99–110)
CO2: 22 MMOL/L (ref 21–32)
CREAT SERPL-MCNC: 0.9 MG/DL (ref 0.6–1.1)
DIFFERENTIAL TYPE: ABNORMAL
EOSINOPHILS ABSOLUTE: 0.2 K/CU MM
EOSINOPHILS RELATIVE PERCENT: 2.7 % (ref 0–3)
GFR AFRICAN AMERICAN: >60 ML/MIN/1.73M2
GFR NON-AFRICAN AMERICAN: >60 ML/MIN/1.73M2
GLUCOSE BLD-MCNC: 109 MG/DL (ref 70–99)
HCT VFR BLD CALC: 33.5 % (ref 37–47)
HEMOGLOBIN: 10.3 GM/DL (ref 12.5–16)
IMMATURE NEUTROPHIL %: 0.3 % (ref 0–0.43)
LYMPHOCYTES ABSOLUTE: 1.2 K/CU MM
LYMPHOCYTES RELATIVE PERCENT: 16.9 % (ref 24–44)
MCH RBC QN AUTO: 28.4 PG (ref 27–31)
MCHC RBC AUTO-ENTMCNC: 30.7 % (ref 32–36)
MCV RBC AUTO: 92.3 FL (ref 78–100)
MONOCYTES ABSOLUTE: 0.5 K/CU MM
MONOCYTES RELATIVE PERCENT: 7.4 % (ref 0–4)
NUCLEATED RBC %: 0 %
PDW BLD-RTO: 14.4 % (ref 11.7–14.9)
PLATELET # BLD: 405 K/CU MM (ref 140–440)
PMV BLD AUTO: 9 FL (ref 7.5–11.1)
POTASSIUM SERPL-SCNC: 3.7 MMOL/L (ref 3.5–5.1)
RBC # BLD: 3.63 M/CU MM (ref 4.2–5.4)
SEGMENTED NEUTROPHILS ABSOLUTE COUNT: 5.2 K/CU MM
SEGMENTED NEUTROPHILS RELATIVE PERCENT: 71.7 % (ref 36–66)
SODIUM BLD-SCNC: 132 MMOL/L (ref 135–145)
TOTAL IMMATURE NEUTOROPHIL: 0.02 K/CU MM
TOTAL NUCLEATED RBC: 0 K/CU MM
TOTAL PROTEIN: 7.3 GM/DL (ref 6.4–8.2)
WBC # BLD: 7.3 K/CU MM (ref 4–10.5)

## 2021-10-04 PROCEDURE — 85025 COMPLETE CBC W/AUTO DIFF WBC: CPT

## 2021-10-04 PROCEDURE — 99284 EMERGENCY DEPT VISIT MOD MDM: CPT

## 2021-10-04 PROCEDURE — 80053 COMPREHEN METABOLIC PANEL: CPT

## 2021-10-05 NOTE — ED PROVIDER NOTES
Triage Chief Complaint:   Abdominal Pain      HPI:  Vanda Stewart is a 80 y.o. female that presents with fatigue and chronic diarrhea. Reports that she has had chronic diarrhea for a year and a half. States he has been working with Dr. Kaylene Ybarra to find out the cause. She is been diagnosed with colitis of different sorts 3 times over the past year and a half. She is on different medications. He had recently put her on mesalamine , which she is now taking. States she continues to have daily diarrhea, multiple times. States it is intermittently bloody. States she has had several colonoscopies during this time. States that she has intermittent abdominal cramping with this. Its in the mid abdomen. States no change to the diarrhea, but reports she has been feeling fatigued which is a new symptom. States that is been going on for about 6 months. States she feels very tired all the time. She gets a full night sleep but still feels tired. She does have history of sleep apnea, states that she wakes frequently at night due to this and does not wear CPAP.    ROS:  General:  No fevers, no chills  Eyes:  No recent vison changes  ENT:  No sore throat, no nasal congestion  Cardiovascular:  No chest pain, no palpitations  Respiratory:  No shortness of breath, no cough  Gastrointestinal:  + pain, no nausea, no vomiting, + diarrhea  Musculoskeletal:  No muscle pain, no joint pain  Skin:  No rash, no pruritis, no easy bruising  Neurologic:  No headache or weakness  Genitourinary:  No dysuria, no hematuria  Extremities:  + chronic edema, no pain    Past Medical History:   Diagnosis Date    Abnormal EKG     Bradycardia     Edema leg     Family history of coronary artery disease     Former smoker     History of cardiac monitoring 10/18/2019    48 hr monitor, Short runs of SVT, few PAC and PVC noted, no significant arrhythmias, no significant pauses.     Hx of cardiovascular stress test 10/17/2019    Fair Exercise Tolerance. No chest pain noted during testing.  Hx of Doppler echocardiogram 10/17/2019    Normal left ventricle structure and systolic function with an ejection fraction of 55-60%. Grade I diastolic dysfunction. Sclerotic, but non-stenotic aortic valve. Mild aortic regurgitation with a pressure half time of 823. Normal pulmonary artery pressure. No evidence of pericardial effusion.  Hyperlipidemia     Pre-op evaluation 10/2019    PVC's (premature ventricular contractions)     SOB (shortness of breath)      Past Surgical History:   Procedure Laterality Date    COLOSTOMY N/A 5/9/2019    BOWEL RESECTION COLOSTOMY HARTMANS PROCEDURE INCIDENTAL APPENDECTOMY MULTIPLE SMALL BOWEL SEROSAL REPAIR performed by Francisco Garcia MD at [facility] 284       Family History   Problem Relation Age of Onset    Dementia Mother     Cancer Father     Lung Cancer Sister     Colon Cancer Sister      Social History     Socioeconomic History    Marital status:      Spouse name: Not on file    Number of children: Not on file    Years of education: Not on file    Highest education level: Not on file   Occupational History    Not on file   Tobacco Use    Smoking status: Former Smoker    Smokeless tobacco: Never Used   Vaping Use    Vaping Use: Never used   Substance and Sexual Activity    Alcohol use: Yes     Comment: once a week    Drug use: Never    Sexual activity: Not on file   Other Topics Concern    Not on file   Social History Narrative    Not on file     Social Determinants of Health     Financial Resource Strain:     Difficulty of Paying Living Expenses:    Food Insecurity:     Worried About [address] in the Last Year:     [address] in the Last Year:    Transportation Needs:     Lack of Transportation (Medical):      Lack of Transportation (Non-Medical):    Physical Activity:     Days of Exercise per Week:     Minutes of Exercise per Session: Stress:     Feeling of Stress :    Social Connections:     Frequency of Communication with Friends and Family:     Frequency of Social Gatherings with Friends and Family:     Attends Muslim Services:     Active Member of Clubs or Organizations:     Attends Club or Organization Meetings:     Marital Status:    Intimate Partner Violence:     Fear of Current or Ex-Partner:     Emotionally Abused:     Physically Abused:     Sexually Abused:      No current facility-administered medications for this encounter. Current Outpatient Medications   Medication Sig Dispense Refill    lansoprazole (PREVACID) 30 MG delayed release capsule TAKE 1 CAPSULE BY MOUTH ONCE DAILY BEFORE A MEAL      ferrous sulfate (IRON 325) 325 (65 Fe) MG tablet Take 325 mg by mouth 3 times daily (with meals)      Cholecalciferol (VITAMIN D3) 1.25 MG (32543 UT) TABS Take by mouth      ondansetron (ZOFRAN ODT) 4 MG disintegrating tablet Take 1 tablet by mouth every 6 hours 10 tablet 0    metoprolol tartrate (LOPRESSOR) 25 MG tablet Take 1 tablet by mouth 2 times daily 180 tablet 3    gabapentin (NEURONTIN) 300 MG capsule Take 300 mg by mouth.  triamterene-hydroCHLOROthiazide (DYAZIDE) 37.5-25 MG per capsule Take 1 capsule by mouth daily as needed      Misc Natural Products (T-RELIEF CBD+13) SUBL Place under the tongue      Handicap Placard MISC by Does not apply route 1 each 0    rOPINIRole (REQUIP) 2 MG tablet Take 2 mg by mouth 2 times daily      atorvastatin (LIPITOR) 10 MG tablet Take 10 mg by mouth daily       No Known Allergies    Nursing Notes Reviewed    Physical Exam:  ED Triage Vitals [10/04/21 1553]   Enc Vitals Group      /72      Pulse 84      Resp 24      Temp 98.6 °F (37 °C)      Temp Source Oral      SpO2 99 %      Weight 223 lb (101.2 kg)      Height 5' 9\" (1.753 m)      Head Circumference       Peak Flow       Pain Score       Pain Loc       Pain Edu? Excl. in [address]?         General appearance: Awake, alert, No acute distress. Neck:  Supple without rigidity  EYE: Pupils are equal round and reactive to light, extraocular muscles are intact, conjunctiva is normal color  ENT: Normal posterior pharynx, moist mucus membranes  Heart:  Regular rate and rhythm, no murmurs  Respiratory:  Lungs clear to auscultation bilaterally. Normal effort. Abdominal:  No significant tenderness to palpation, no rebound guarding or rigidity, normal bowel sounds, no masses, no organomegaly   Extremity: normal ROM, no edema  Skin: Warm. Dry. No rash. Neurological:  Alert and oriented. No focal deficits.  Speech normal.  Psyc: Normal mood and affect    I have reviewed and interpreted all of the currently available lab results from this visit (if applicable):  Results for orders placed or performed during the hospital encounter of 10/04/21   CBC auto diff   Result Value Ref Range    WBC 7.3 4.0 - 10.5 K/CU MM    RBC 3.63 (L) 4.2 - 5.4 M/CU MM    Hemoglobin 10.3 (L) 12.5 - 16.0 GM/DL    Hematocrit 33.5 (L) 37 - 47 %    MCV 92.3 78 - 100 FL    MCH 28.4 27 - 31 PG    MCHC 30.7 (L) 32.0 - 36.0 %    RDW 14.4 11.7 - 14.9 %    Platelets 980 777 - 126 K/CU MM    MPV 9.0 7.5 - 11.1 FL    Differential Type AUTOMATED DIFFERENTIAL     Segs Relative 71.7 (H) 36 - 66 %    Lymphocytes % 16.9 (L) 24 - 44 %    Monocytes % 7.4 (H) 0 - 4 %    Eosinophils % 2.7 0 - 3 %    Basophils % 1.0 0 - 1 %    Segs Absolute 5.2 K/CU MM    Lymphocytes Absolute 1.2 K/CU MM    Monocytes Absolute 0.5 K/CU MM    Eosinophils Absolute 0.2 K/CU MM    Basophils Absolute 0.1 K/CU MM    Nucleated RBC % 0.0 %    Total Nucleated RBC 0.0 K/CU MM    Total Immature Neutrophil 0.02 K/CU MM    Immature Neutrophil % 0.3 0 - 0.43 %   CMP   Result Value Ref Range    Sodium 132 (L) 135 - 145 MMOL/L    Potassium 3.7 3.5 - 5.1 MMOL/L    Chloride 99 99 - 110 mMol/L    CO2 22 21 - 32 MMOL/L    BUN 12 6 - 23 MG/DL    CREATININE 0.9 0.6 - 1.1 MG/DL    Glucose 109 (H) 70 - 99 MG/DL Calcium 8.7 8.3 - 10.6 MG/DL    Albumin 4.0 3.4 - 5.0 GM/DL    Total Protein 7.3 6.4 - 8.2 GM/DL    Total Bilirubin 0.3 0.0 - 1.0 MG/DL    ALT 22 10 - 40 U/L    AST 30 15 - 37 IU/L    Alkaline Phosphatase 95 40 - 129 IU/L    GFR Non-African American >60 >60 mL/min/1.73m2    GFR African American >60 >60 mL/min/1.73m2    Anion Gap 11 4 - 16          Chart review shows recent radiographs:  No results found. MDM:  This is a 71-year-old female who presented with chronic ongoing diarrhea for a year and a half. Also reports chronic fatigue over 5 to 6 months. Of time. Her hemoglobin is 10.3, appears to be slightly above what her previous was at 9 range. White blood cell count is 7.3, electrolytes are within normal range with exception of sodium which is slightly low at 132. She appears well-hydrated. She was able to tolerate PO and was able to eat snickers bar while in the ED. Her abdominal exam is nonsurgical.  This point I do not have etiology for her symptoms, but she has had an evaluation by specialist Dr. Kaylene Ybarra. I did discuss with him over the phone. Patient does not meet criteria for hospitalization at this point, also the bed situation is very tight with very critical patient is taking of the majority of the bed space in the hospital currently and were boarding in the emergency department. Does feel that she can be discharged home, he instructed that she should call the office and he will review her most recent colonoscopy in order to assess if she needs steroids. Did states she could take over-the-counter Imodium until follow up. I did discuss this with her. This point we will plan for discharge home with instructions to follow-up outpatient with Dr. Kaylene Ybarra for further evaluation and care. If she has any new or worsening signs or symptoms she is provided with instructions to return to the emergency department for reevaluation.       Clinical Impression:  Chronic diarrhea, fatigue       (Please note that portions of this note may have been completed with a voice recognition program. Efforts were made to edit the dictations but occasionally words are mis-transcribed.)      Anaid Peter DO  10/04/21 2112

## 2021-10-05 NOTE — ED NOTES
Discharge instructions reviewed with pt. All questions answered at this time. Pt verbalized understanding.       Pamela Drake RN  10/04/21 [id number]

## 2021-12-02 LAB
ALBUMIN SERPL-MCNC: 4.3 G/DL
ALP BLD-CCNC: 88 U/L
ALT SERPL-CCNC: 19 U/L
ANION GAP SERPL CALCULATED.3IONS-SCNC: NORMAL MMOL/L
AST SERPL-CCNC: 24 U/L
BILIRUB SERPL-MCNC: 0.4 MG/DL (ref 0.1–1.4)
BUN BLDV-MCNC: 10 MG/DL
CALCIUM SERPL-MCNC: 9.3 MG/DL
CHLORIDE BLD-SCNC: 103 MMOL/L
CHOLESTEROL, TOTAL: 131 MG/DL
CHOLESTEROL/HDL RATIO: NORMAL
CO2: 22 MMOL/L
CREAT SERPL-MCNC: 1.09 MG/DL
GFR CALCULATED: NORMAL
GLUCOSE BLD-MCNC: 101 MG/DL
HDLC SERPL-MCNC: 45 MG/DL (ref 35–70)
LDL CHOLESTEROL CALCULATED: 69 MG/DL (ref 0–160)
NONHDLC SERPL-MCNC: NORMAL MG/DL
POTASSIUM SERPL-SCNC: 4.1 MMOL/L
SODIUM BLD-SCNC: 140 MMOL/L
TOTAL PROTEIN: 7.4
TRIGL SERPL-MCNC: 87 MG/DL
VLDLC SERPL CALC-MCNC: 17 MG/DL

## 2021-12-17 ENCOUNTER — OFFICE VISIT (OUTPATIENT)
Dept: CARDIOLOGY CLINIC | Age: 71
End: 2021-12-17
Payer: MEDICARE

## 2021-12-17 ENCOUNTER — NURSE ONLY (OUTPATIENT)
Dept: CARDIOLOGY CLINIC | Age: 71
End: 2021-12-17
Payer: MEDICARE

## 2021-12-17 VITALS
DIASTOLIC BLOOD PRESSURE: 64 MMHG | HEART RATE: 71 BPM | BODY MASS INDEX: 32.93 KG/M2 | HEIGHT: 69 IN | SYSTOLIC BLOOD PRESSURE: 106 MMHG

## 2021-12-17 DIAGNOSIS — R07.2 PRECORDIAL PAIN: ICD-10-CM

## 2021-12-17 DIAGNOSIS — R06.02 SOB (SHORTNESS OF BREATH): Primary | ICD-10-CM

## 2021-12-17 DIAGNOSIS — R00.1 BRADYCARDIA: Primary | ICD-10-CM

## 2021-12-17 DIAGNOSIS — R00.1 BRADYCARDIA: ICD-10-CM

## 2021-12-17 PROCEDURE — 93228 REMOTE 30 DAY ECG REV/REPORT: CPT | Performed by: INTERNAL MEDICINE

## 2021-12-17 PROCEDURE — 1090F PRES/ABSN URINE INCON ASSESS: CPT | Performed by: INTERNAL MEDICINE

## 2021-12-17 PROCEDURE — 93000 ELECTROCARDIOGRAM COMPLETE: CPT | Performed by: INTERNAL MEDICINE

## 2021-12-17 PROCEDURE — 3017F COLORECTAL CA SCREEN DOC REV: CPT | Performed by: INTERNAL MEDICINE

## 2021-12-17 PROCEDURE — G8417 CALC BMI ABV UP PARAM F/U: HCPCS | Performed by: INTERNAL MEDICINE

## 2021-12-17 PROCEDURE — G8484 FLU IMMUNIZE NO ADMIN: HCPCS | Performed by: INTERNAL MEDICINE

## 2021-12-17 PROCEDURE — 1123F ACP DISCUSS/DSCN MKR DOCD: CPT | Performed by: INTERNAL MEDICINE

## 2021-12-17 PROCEDURE — 4040F PNEUMOC VAC/ADMIN/RCVD: CPT | Performed by: INTERNAL MEDICINE

## 2021-12-17 PROCEDURE — 1036F TOBACCO NON-USER: CPT | Performed by: INTERNAL MEDICINE

## 2021-12-17 PROCEDURE — G8400 PT W/DXA NO RESULTS DOC: HCPCS | Performed by: INTERNAL MEDICINE

## 2021-12-17 PROCEDURE — 99214 OFFICE O/P EST MOD 30 MIN: CPT | Performed by: INTERNAL MEDICINE

## 2021-12-17 PROCEDURE — G8427 DOCREV CUR MEDS BY ELIG CLIN: HCPCS | Performed by: INTERNAL MEDICINE

## 2021-12-17 RX ORDER — LANOLIN ALCOHOL/MO/W.PET/CERES
20 CREAM (GRAM) TOPICAL DAILY
COMMUNITY

## 2021-12-17 NOTE — PROGRESS NOTES
CARDIOLOGY NOTE    Chief Complaint: SOB      HPI:   Dora Bryan is a 80y.o. year old who has Past medical history as noted below. She has colitis and having a lot of diarrhea, She is struggling with RLS and Colitis making her life very difficult. She has had colitis for almost 1 year now ,Sees Dr Stephani Stone . She is on prednisone , she has gained weight , she has been having palpiations heart thumping mostly at night and sometimes at rest,  \"feels like electrical shock\", she gest winded while walking in the [facility] without  she lost 2 sisters in a month . She gets dizzy and feels unbalanced when walking . Dr Missy Trejo did colostomy reveresed and also did leg venous ablation . She has put on 25 lbs since her last visit   She is having a lot of tremors and jerking of legs , she saw  neurology . She is exhausted by just doing laundry and walking    She does report shortness of breath she does not walk much due to shortness of breath she is a strong family history of early coronary artery disease. She had colostomy reversal which has helped She is on Requip    As such she denies any chest pain but she does not do much. Current Outpatient Medications   Medication Sig Dispense Refill    melatonin 3 MG TABS tablet Take 20 mg by mouth daily      lansoprazole (PREVACID) 30 MG delayed release capsule TAKE 1 CAPSULE BY MOUTH ONCE DAILY BEFORE A MEAL      ferrous sulfate (IRON 325) 325 (65 Fe) MG tablet Take 325 mg by mouth 3 times daily (with meals)      Cholecalciferol (VITAMIN D3) 1.25 MG (57377 UT) TABS Take by mouth      metoprolol tartrate (LOPRESSOR) 25 MG tablet Take 1 tablet by mouth 2 times daily (Patient taking differently: Take 25 mg by mouth daily ) 180 tablet 3    gabapentin (NEURONTIN) 300 MG capsule Take 300 mg by mouth.       triamterene-hydroCHLOROthiazide (DYAZIDE) 37.5-25 MG per capsule Take 1 capsule by mouth daily as needed      Handicap Placard MISC by Does not apply route 1 each 0    atorvastatin (LIPITOR) 10 MG tablet Take 10 mg by mouth daily       No current facility-administered medications for this visit. Allergies:   Patient has no known allergies. Patient History:  Past Medical History:   Diagnosis Date    Abnormal EKG     Bradycardia     Edema leg     Family history of coronary artery disease     Former smoker     History of cardiac monitoring 10/18/2019    48 hr monitor, Short runs of SVT, few PAC and PVC noted, no significant arrhythmias, no significant pauses.  Hx of cardiovascular stress test 10/17/2019    Fair Exercise Tolerance. No chest pain noted during testing.  Hx of Doppler echocardiogram 10/17/2019    Normal left ventricle structure and systolic function with an ejection fraction of 55-60%. Grade I diastolic dysfunction. Sclerotic, but non-stenotic aortic valve. Mild aortic regurgitation with a pressure half time of 823. Normal pulmonary artery pressure. No evidence of pericardial effusion.  Hyperlipidemia     Pre-op evaluation 10/2019    PVC's (premature ventricular contractions)     SOB (shortness of breath)      Past Surgical History:   Procedure Laterality Date    COLOSTOMY N/A 5/9/2019    BOWEL RESECTION COLOSTOMY HARTMANS PROCEDURE INCIDENTAL APPENDECTOMY MULTIPLE SMALL BOWEL SEROSAL REPAIR performed by Barbi Watters MD at [facility] 284       Family History   Problem Relation Age of Onset    Dementia Mother     Cancer Father     Lung Cancer Sister     Colon Cancer Sister      Social History     Tobacco Use    Smoking status: Former Smoker    Smokeless tobacco: Never Used   Substance Use Topics    Alcohol use: Yes     Comment: once a week        Review of Systems:   · Constitutional: No Fever or Weight Loss   · Eyes: No Decreased Vision  · ENT: No Headaches, Hearing Loss or Vertigo  · Cardiovascular: as per note above   · Respiratory: No cough or wheezing and as per note above. · Gastrointestinal: No abdominal pain, appetite loss, blood in stools, constipation, diarrhea or heartburn  · Genitourinary: No dysuria, trouble voiding, or hematuria  · Musculoskeletal:  denies any new  joint aches , swelling  or pain   · Integumentary: No rash or pruritis  · Neurological: No TIA or stroke symptoms  · Psychiatric: No anxiety or depression  · Endocrine: No malaise, fatigue or temperature intolerance  · Hematologic/Lymphatic: No bleeding problems, blood clots or swollen lymph nodes  · Allergic/Immunologic: No nasal congestion or hives    Objective:      Physical Exam:  /64 (Position: Standing)   Pulse 71   Ht 5' 9\" (1.753 m)   BMI 32.93 kg/m²   Wt Readings from Last 3 Encounters:   10/04/21 223 lb (101.2 kg)   02/23/21 225 lb (102.1 kg)   12/08/20 221 lb (100.2 kg)     Body mass index is 32.93 kg/m². Vitals:    12/17/21 1136   BP: 106/64   Pulse:         General Appearance:  No distress, conversant  Constitutional:  Well developed, Well nourished, No acute distress, Non-toxic appearance. HENT:  Normocephalic, Atraumatic, Bilateral external ears normal, Oropharynx moist, No oral exudates, Nose normal. Neck- Normal range of motion, No tenderness, Supple, No stridor,no apical-carotid delay  Eyes:  PERRL, EOMI, Conjunctiva normal, No discharge. Respiratory:  Normal breath sounds, No respiratory distress, No wheezing, No chest tenderness. ,no use of accessory muscles, NO crackles  Cardiovascular: (PMI) apex non displaced,no lifts no thrills,S1 and S2 audible, No added heart sounds, 1+ ankle edema, or volume overload, No evidence of JVD, No crackles  GI: There is post colostomy bowel sounds normal, Soft, No tenderness, No masses, No gross visceromegaly   :  No costovertebral angle tenderness   Musculoskeletal: 1+ edema, no tenderness, no deformities.  Back- no tenderness  Integument:  Well hydrated, no rash   Lymphatic:  No lymphadenopathy noted   Neurologic:  Alert & oriented x 3, CN 2-12 normal, normal motor function, normal sensory function, no focal deficits noted   Psychiatric:  Speech and behavior appropriate       Medical decision making and Data review:  DATA:  No results found for: TROPONINT  BNP:  No results found for: PROBNP  PT/INR:  No results found for: PTINR  No results found for: LABA1C  No results found for: CHOL, TRIG, HDL, LDLCALC, LDLDIRECT  Lab Results   Component Value Date    ALT 22 10/04/2021    AST 30 10/04/2021     No results for input(s): WBC, HGB, HCT, MCV, PLT in the last 72 hours. TSH: No results found for: TSH  Lab Results   Component Value Date    AST 30 10/04/2021    ALT 22 10/04/2021    BILITOT 0.3 10/04/2021    ALKPHOS 95 10/04/2021     No results found for: PROBNP  No results found for: LABA1C  Lab Results   Component Value Date    WBC 7.3 10/04/2021    HGB 10.3 (L) 10/04/2021    HCT 33.5 (L) 10/04/2021     10/04/2021     Stress test  10/17/19   Stress Type: Exercise      Peak HR: 131 bpm                       HR Response: Appropriate   Peak BP: 158/80 mmHg                   BP Response: Normal resting BP -   Predicted HR: 152 bpm                  appropriate response   % of predicted HR: 86                  HR BP Product: 10207   Exercise Duration: 04:00 min           Max Exercise: 5.8 METS   Reason for Termination: Target heart   rate                                   Exercise Effort: Fair     Echo 10/17/19   Summary   Normal left ventricle structure and systolic function with an ejection   fraction of 55-60%. Grade I diastolic dysfunction. Sclerotic, but non-stenotic aortic valve. Mild aortic regurgitation with a pressure half time of 823. Normal pulmonary artery pressure. No evidence of pericardial effusion. All labs, medications and tests reviewed by myself including data and history from outside source , patient and available family . Assessment & Plan:      1. SOB (shortness of breath)    2. Bradycardia    3.  Precordial pain Palpitations  Get treadmill to unmask arrhythmias and get 30 day monitor , check lytes and mag and tsh      Restless leg and tremors  Related to RLS     Hypertension  If bp is low stop or lower metoprolol , she is asking for handicap placard , will give her due to dizziness     SOB (shortness of breath)  She has lower extremity swelling and shortness of breath but echo is normal , leg venous ablation was done by Dr Molina Proper      Dyslipidemia :  All available lab work was reviewed. Patient was advised to repeat lab work before next visit. Necessary orders were placed , instructions given by myself       Counseled extensively and medication compliance urged. We discussed that for the  prevention of ASCVD our  goal is aggressive risk modification. Patient is encouraged to exercise even a brisk walk for 30 minutes  at least 3 to 4 times a week   Various goals were discussed and questions answered. Continue current medications. Appropriate prescriptions are addressed and refills ordered. Questions answered and patient verbalizes understanding. Call for any problems, questions, or concerns. Continue all other medications of all above medical condition listed as is. Return in about 6 months (around 6/17/2022). Please note this report has been partially produced using speech recognition software and may contain errors related to that system including errors in grammar, punctuation, and spelling, as well as words and phrases that may be inappropriate. If there are any questions or concerns please feel free to contact the dictating provider for clarification.

## 2021-12-17 NOTE — LETTER
Tiesha Freeman  1950  [id number]    Have you had any Chest Pain that is not new? - Yes  If Yes DO EKG - How does it feel - Sharp Pain   How long does the pain last -  seconds    How long have you been having the pain - Months   Did you take a    And did it relieve the pain -      DO EKG IF: Patient has a Heart Rate above 100 or below 40     CAD (Coronary Artery Disease) patient should have one on file every 6 months        Have you had any Shortness of Breath - Yes  If Yes - When at rest and on exertion    Have you had any dizziness - No  If Yes DO ORTHOSTATIC BP - when do you feel dizzy with position change   How long does it last .  seconds      Sitting wait 5 minutes do supine (laying down) wait 5 minutes then do standing - log each in \"vitals\" area in Epic   Be sure to ask what symptoms they are having if they get dizzy while completing ortho stats such as: room spinning, nausea, etc.    Have you had any palpitations that are not new? - No  If Yes DO EKG - Do you feel your heart   How long does it last - . Is the patient on any of the following medications -   If Yes DO EKG - Needs done every 6 months    Do you have any edema - swelling in bilateral ankles legs and feet    If Yes - CHECK TO SEE IF THE EDEMA IS PITTING  How long have they been having edema - Years  If Yes - Have they worn compression stockings no  If they have worn compression stockings      Vein \"LEG PROBLEM Questionnaire\"  1. Do you have prominent leg veins? No   2. Do you have any skin discoloration? No  3. Do you have any healed or active sores? No  4. Do you have swelling of the legs? Yes  5. Do you have a family history of varicose veins? Yes  6. Does your profession involve pro-longed        standing or heavy lifting? No  7. Have you been fighting overweight problems? Yes  8. Do you have restless legs? Yes  9. Do you have any night time cramps? No  10.  Do you have any of the following in your legs: All of the above     When did you have your last labs drawn   Where did you have them done   What doctor ordered     If we do not have these labs you are retrieve these labs for these providers! Do you have a surgery or procedure scheduled in the near future - No  If Yes- DO EKG  If Yes - Who is the surgery with?    Phone number of physician   Fax number of physician   Type of surgery   GIVE THIS INFORMATION TO LUKE BAEZA     Ask patient if they want to sign up for [facility] if they are not already signed up     Check to see if we have an E-MAIL on file for the patient     Check medication list thoroughly!!! AND RECONCILE OUTSIDE MEDICATIONS  If dose has changed change the entire order not just the [place] At check out add to every patient's \"wrap up\" the following dot phrase AFTERHOURSEDUCATION and ensure we explain this to our patients

## 2021-12-17 NOTE — PATIENT INSTRUCTIONS
Please be informed that if you contact our office outside of normal business hours the physician on call cannot help with any scheduling or rescheduling issues, procedure instruction questions or any type of medication issue. We advise you for any urgent/emergency that you go to the nearest emergency room! PLEASE CALL OUR OFFICE DURING NORMAL BUSINESS HOURS    Monday - Friday   8 am to 5 pm    [facility]: [phone]    [place]:  [phone]  **It is YOUR responsibilty to bring medication bottles and/or updated medication list to [address] [place] [address]. This will allow us to better serve you and all your healthcare needs**  [facility] Locations - No appointment necessary. Sites open Monday to Friday. Call your preferred location for test preparation, business hours and other information you need. [employer] accepts [employer]. [facility]   [facility] [address] [place], [address] [facility]  Phone: [phone] [facility] [address] [facility]  [address]. Keturah Ulrich, [address] [place] [address]  Phone: [phone]       Please hold on to these instructions the  will call you within 1-9 business days when we receive authorization from your insurance. Echocardiogram    WHAT TO EXPECT:   ? This test will take approximately 45 minutes. ? It is an ultrasound of the heart. ? It can look at the valves and chambers inside the heart   ? There is no special instructions for this test.     If you are unable to keep this appointment, please notify us 24 hours prior to test at [phone]. Please hold on to these instructions the  will call you within 1-9 business days when we receive authorization from your insurance. Treadmill Stress test    WHAT TO EXPECT:     The exercise stress test is a test used to provide information about how the heart responds to exertion.  It involves walking on a treadmill at increasing levels of difficulty, while

## 2021-12-22 ENCOUNTER — HOSPITAL ENCOUNTER (OUTPATIENT)
Age: 71
Discharge: HOME | End: 2021-12-22
Payer: MEDICARE

## 2021-12-22 ENCOUNTER — OFFICE VISIT (OUTPATIENT)
Dept: CARDIOLOGY CLINIC | Age: 71
End: 2021-12-22
Payer: MEDICARE

## 2021-12-22 ENCOUNTER — PROCEDURE VISIT (OUTPATIENT)
Dept: CARDIOLOGY CLINIC | Age: 71
End: 2021-12-22
Payer: MEDICARE

## 2021-12-22 ENCOUNTER — TELEPHONE (OUTPATIENT)
Dept: CARDIOLOGY CLINIC | Age: 71
End: 2021-12-22

## 2021-12-22 VITALS
HEIGHT: 69 IN | HEART RATE: 62 BPM | WEIGHT: 223 LBS | DIASTOLIC BLOOD PRESSURE: 62 MMHG | BODY MASS INDEX: 33.03 KG/M2 | SYSTOLIC BLOOD PRESSURE: 116 MMHG

## 2021-12-22 DIAGNOSIS — R06.02 SOB (SHORTNESS OF BREATH): ICD-10-CM

## 2021-12-22 DIAGNOSIS — R00.1 BRADYCARDIA: ICD-10-CM

## 2021-12-22 DIAGNOSIS — I48.92 PAROXYSMAL ATRIAL FLUTTER (HCC): ICD-10-CM

## 2021-12-22 DIAGNOSIS — R07.2 PRECORDIAL PAIN: ICD-10-CM

## 2021-12-22 DIAGNOSIS — I87.2 CHRONIC VENOUS INSUFFICIENCY: Primary | ICD-10-CM

## 2021-12-22 DIAGNOSIS — R94.31 ABNORMAL EKG: ICD-10-CM

## 2021-12-22 LAB
ANION GAP SERPL CALCULATED.3IONS-SCNC: 12 MMOL/L (ref 4–16)
BUN BLDV-MCNC: 17 MG/DL (ref 6–23)
CALCIUM SERPL-MCNC: 9.5 MG/DL (ref 8.3–10.6)
CHLORIDE BLD-SCNC: 103 MMOL/L (ref 99–110)
CO2: 26 MMOL/L (ref 21–32)
CREAT SERPL-MCNC: 0.9 MG/DL (ref 0.6–1.1)
GFR AFRICAN AMERICAN: >60 ML/MIN/1.73M2
GFR NON-AFRICAN AMERICAN: >60 ML/MIN/1.73M2
GLUCOSE BLD-MCNC: 90 MG/DL (ref 70–99)
LV EF: 58 %
LVEF MODALITY: NORMAL
MAGNESIUM: 1.9 MG/DL (ref 1.8–2.4)
POTASSIUM SERPL-SCNC: 3.8 MMOL/L (ref 3.5–5.1)
SODIUM BLD-SCNC: 141 MMOL/L (ref 135–145)

## 2021-12-22 PROCEDURE — 36415 COLL VENOUS BLD VENIPUNCTURE: CPT

## 2021-12-22 PROCEDURE — 4040F PNEUMOC VAC/ADMIN/RCVD: CPT | Performed by: INTERNAL MEDICINE

## 2021-12-22 PROCEDURE — 80048 BASIC METABOLIC PNL TOTAL CA: CPT

## 2021-12-22 PROCEDURE — 83735 ASSAY OF MAGNESIUM: CPT

## 2021-12-22 PROCEDURE — G8417 CALC BMI ABV UP PARAM F/U: HCPCS | Performed by: INTERNAL MEDICINE

## 2021-12-22 PROCEDURE — 1090F PRES/ABSN URINE INCON ASSESS: CPT | Performed by: INTERNAL MEDICINE

## 2021-12-22 PROCEDURE — 93015 CV STRESS TEST SUPVJ I&R: CPT | Performed by: INTERNAL MEDICINE

## 2021-12-22 PROCEDURE — 1123F ACP DISCUSS/DSCN MKR DOCD: CPT | Performed by: INTERNAL MEDICINE

## 2021-12-22 PROCEDURE — 3017F COLORECTAL CA SCREEN DOC REV: CPT | Performed by: INTERNAL MEDICINE

## 2021-12-22 PROCEDURE — 93306 TTE W/DOPPLER COMPLETE: CPT | Performed by: INTERNAL MEDICINE

## 2021-12-22 PROCEDURE — 99214 OFFICE O/P EST MOD 30 MIN: CPT | Performed by: INTERNAL MEDICINE

## 2021-12-22 PROCEDURE — G8400 PT W/DXA NO RESULTS DOC: HCPCS | Performed by: INTERNAL MEDICINE

## 2021-12-22 PROCEDURE — 1036F TOBACCO NON-USER: CPT | Performed by: INTERNAL MEDICINE

## 2021-12-22 PROCEDURE — G8484 FLU IMMUNIZE NO ADMIN: HCPCS | Performed by: INTERNAL MEDICINE

## 2021-12-22 PROCEDURE — G8428 CUR MEDS NOT DOCUMENT: HCPCS | Performed by: INTERNAL MEDICINE

## 2021-12-22 NOTE — PATIENT INSTRUCTIONS
Increase Metoprolol to 25 mg every 12 hours and discontinue Dyazide. Eliquis 5 mg every 12 hours if tolerated and EP consultation with Dr Marta ROE. Follow-up with Dr Sue Yan.

## 2021-12-22 NOTE — PROGRESS NOTES
Felix Whitney (:  1950) is a 80 y.o. female,     Chief Complaint   Patient presents with    Tachycardia     Patient is seen today because she developed tachycardia on a stress test and it is very much suggestive of atrial flutter with 2-1 conduction happened within a minute of stress test and is accompanied with shortness of breath and it resolved and reverted to normal sinus rhythm during first minute of recovery. She takes Lopressor 25 mg twice a day which she was holding it this morning for the stress test.  Patient has a cardiac monitor on. She has history of atypical chest pains however did not have any chest pain on a stress test today. She has history of severe colitis had multiple colonoscopies by Dr. Angie Castro gastroenterologist and now she started herself on prednisone and the symptoms of colitis have diminished. Otherwise she was having lots of diarrhea recently with abdominal cramps. She has chronic leg swelling and has history of previous bilateral venous ablations by Dr. Niya Escobar. She takes Dyazide for swelling which is not helping her swelling. No Known Allergies  Prior to Admission medications    Medication Sig Start Date End Date Taking?  Authorizing Provider   apixaban (ELIQUIS) 5 MG TABS tablet Take 1 tablet by mouth 2 times daily 21  Yes Cesar Amador MD   melatonin 3 MG TABS tablet Take 20 mg by mouth daily    Historical Provider, MD   lansoprazole (PREVACID) 30 MG delayed release capsule TAKE 1 CAPSULE BY MOUTH ONCE DAILY BEFORE A MEAL 2/15/21   Historical Provider, MD   ferrous sulfate (IRON 325) 325 (65 Fe) MG tablet Take 325 mg by mouth 3 times daily (with meals)    Historical Provider, MD   Cholecalciferol (VITAMIN D3) 1.25 MG (08300 UT) TABS Take by mouth    Historical Provider, MD   metoprolol tartrate (LOPRESSOR) 25 MG tablet Take 1 tablet by mouth 2 times daily  Patient taking differently: Take 25 mg by mouth daily  20   Kaycee Green MD gabapentin (NEURONTIN) 300 MG capsule Take 300 mg by mouth. 7/17/20   Historical Provider, MD   John Hoff [address] [facility] by Does not apply route 6/5/19   Pascale Kang MD   atorvastatin (LIPITOR) 10 MG tablet Take 10 mg by mouth daily    Historical Provider, MD     Past Medical History:   Diagnosis Date    Abnormal EKG     Bradycardia     Edema leg     Family history of coronary artery disease     Former smoker     History of cardiac monitoring 10/18/2019    48 hr monitor, Short runs of SVT, few PAC and PVC noted, no significant arrhythmias, no significant pauses.  Hx of cardiovascular stress test 10/17/2019    Fair Exercise Tolerance. No chest pain noted during testing.  Hx of Doppler echocardiogram 10/17/2019    Normal left ventricle structure and systolic function with an ejection fraction of 55-60%. Grade I diastolic dysfunction. Sclerotic, but non-stenotic aortic valve. Mild aortic regurgitation with a pressure half time of 823. Normal pulmonary artery pressure. No evidence of pericardial effusion.  Hyperlipidemia     Paroxysmal atrial flutter ([address] 12/22/2021    Exercise induced 12/22/2021 stress test    Pre-op evaluation 10/2019    PVC's (premature ventricular contractions)     SOB (shortness of breath)       Vitals:    12/22/21 1145   BP: 116/62   Site: Right Upper Arm   Position: Sitting   Cuff Size: Large Adult   Pulse: 62   Weight: 223 lb (101.2 kg)   Height: 5' 9\" (1.753 m)      Body mass index is 32.93 kg/m². Wt Readings from Last 3 Encounters:   12/22/21 223 lb (101.2 kg)   10/04/21 223 lb (101.2 kg)   02/23/21 225 lb (102.1 kg)     Constitutional:  Patient is moderately obese female in no apparent distress. HEENT: She is wearing glasses and facemask. Cardiovascular: Auscultation: Normal S1 and S2. No significant murmurs noted. Respiratory:  Respiratory effort is normal. Breath sounds are clear to auscultation. Extremities: No significant pitting edema noted.   Neurologic: Oriented to time, place, and person and non-anxious. No focal neurological deficit noted. Psychiatric: Normal mood and effect. She had abnormal stress test today. She exercised for 3 minutes on standard Edmund protocol heart rate rapidly increased from 83 to 160 bpm and the EKG is suggestive of atrial flutter with 2 1 AV conduction. No ST segment depressions noted . Patient stayed in atrial flutter throughout the study and reverted to normal sinus rhythm 1 minute into recovery. Blood pressure increased from 116/62 mmHg at rest to 132/68 mmHg on peak exercise. she did not have any chest pains. [id number] Score for Atrial Fibrillation Stroke Risk is 2    Pertinent records reviewed and discussed with patient and results are as follow:    Lab Results   Component Value Date    WBC 7.3 10/04/2021    HGB 10.3 10/04/2021    HCT 33.5 10/04/2021     10/04/2021     Lab Results   Component Value Date    CHOL 131 12/02/2021    TRIG 87 12/02/2021    HDL 45 12/02/2021    LDLCALC 69 12/02/2021     Lab Results   Component Value Date    BUN 10 12/02/2021    CREATININE 1.09 12/02/2021     12/02/2021    K 4.1 12/02/2021     ASSESSMENT/PLAN:    1. Chronic venous insufficiency  Assessment & Plan:  Status post bilateral venous ablation. I asked her to stop taking diuretic as it is not helping her swelling and check her electrolytes and magnesium as she had a lot of diarrhea in the recent days. 2. Paroxysmal atrial flutter ([address]  Assessment & Plan:  She is counseled to resume Lopressor 25 twice a day. Chads vascular score is 2. Started on Eliquis 5 mg twice a day. Potential risks and complications of anticoagulation therapy discussed in detail and she verbalized understanding and asked questions. If she has any signs of intolerance she can stop the medication and let us know. I will notify Dr. Nick Scherer patient's PCP as well.   She will follow up with Dr. Shannan Garland as scheduled and we will make her an appointment to see Dr. Yosi Euceda for arrhythmia management. Orders:  -     Basic Metabolic Panel; Future  -     Magnesium; Future  -     Ambulatory referral to Cardiology      Increase Metoprolol to 25 mg every 12 hours and discontinue Dyazide. Eliquis 5 mg every 12 hours if tolerated and EP consultation with Dr Yosi Euceda ASAP. Follow-up with Dr Ashish Quezada. An electronic signature was used to authenticate this note.     --Justin Washington MD

## 2021-12-22 NOTE — ASSESSMENT & PLAN NOTE
She is counseled to resume Lopressor 25 twice a day. Chads vascular score is 2. Started on Eliquis 5 mg twice a day. Potential risks and complications of anticoagulation therapy discussed in detail and she verbalized understanding and asked questions. If she has any signs of intolerance she can stop the medication and let us know. I will notify Dr. Jace Landau patient's PCP as well. She will follow up with Dr. Gael Navas as scheduled and we will make her an appointment to see Dr. Geni Hill for arrhythmia management.

## 2021-12-23 ENCOUNTER — TELEPHONE (OUTPATIENT)
Dept: CARDIOLOGY CLINIC | Age: 71
End: 2021-12-23

## 2021-12-23 NOTE — TELEPHONE ENCOUNTER
Summary   Left ventricular function and size is normal, EF is estimated at 55-60%. Mild left ventricular hypertrophy with Grade I diastolic dysfunction. No regional wall motion abnormalities were detected. Mildly dilated left atrium. Sclerotic, but non-stenotic aortic valve. Mitral annular calcification is present. Mild mitral ,aortic,pulmonic tricuspid regurgitation is present. RVSP is 26 mmHg. No evidence of pericardial effusion. Notified patient of normal ECHO test results and she verbally understood.

## 2022-01-03 ENCOUNTER — TELEPHONE (OUTPATIENT)
Dept: CARDIOLOGY CLINIC | Age: 72
End: 2022-01-03

## 2022-01-03 NOTE — TELEPHONE ENCOUNTER
Patient called requesting samples of Eliquis, patient was advised that samples should be ready for  by tomorrow afternoon, please call with any problems at ph# [phone].

## 2022-01-03 NOTE — TELEPHONE ENCOUNTER
----- Message from FABIEN Eden CNP sent at 12/28/2021  5:01 PM EST -----  Labs are acceptable  ----- Message -----  From: Charlie Hamilton In [place]  Sent: 12/28/2021   8:10 AM EST  To: Ashley Quintanilla MD    Pt verbally understood results

## 2022-01-06 ENCOUNTER — TELEPHONE (OUTPATIENT)
Dept: CARDIOLOGY CLINIC | Age: 72
End: 2022-01-06

## 2022-01-06 NOTE — TELEPHONE ENCOUNTER
----- Message from Mani Martinez, [facility] sent at 12/30/2021  9:10 AM EST -----    ----- Message -----  From: FABIEN Nunn Asa, CNP  Sent: 12/28/2021   5:14 PM EST  To: Mani Martinez MA    Please ask patient to start mag 250 mg Daily OTC.   Her mag is a little low  ----- Message -----  From: Alfonso, Charlie In [facility]  Sent: 12/28/2021   8:10 AM EST  To: Guerrero Gooden MD

## 2022-01-06 NOTE — TELEPHONE ENCOUNTER
Notified patient of 250mg magnesium adjustment to raise it from a low normal to normal. Patient verbally understood and agreed to adjust medication.

## 2022-01-14 ENCOUNTER — INITIAL CONSULT (OUTPATIENT)
Dept: CARDIOLOGY CLINIC | Age: 72
End: 2022-01-14
Payer: MEDICARE

## 2022-01-14 VITALS
WEIGHT: 223 LBS | SYSTOLIC BLOOD PRESSURE: 110 MMHG | OXYGEN SATURATION: 98 % | HEART RATE: 86 BPM | RESPIRATION RATE: 14 BRPM | HEIGHT: 69 IN | DIASTOLIC BLOOD PRESSURE: 64 MMHG | BODY MASS INDEX: 33.03 KG/M2

## 2022-01-14 DIAGNOSIS — R07.2 PRECORDIAL PAIN: ICD-10-CM

## 2022-01-14 DIAGNOSIS — I48.92 ATRIAL FLUTTER, UNSPECIFIED TYPE (HCC): Primary | ICD-10-CM

## 2022-01-14 DIAGNOSIS — R00.1 BRADYCARDIA: ICD-10-CM

## 2022-01-14 PROCEDURE — 4040F PNEUMOC VAC/ADMIN/RCVD: CPT | Performed by: INTERNAL MEDICINE

## 2022-01-14 PROCEDURE — 1123F ACP DISCUSS/DSCN MKR DOCD: CPT | Performed by: INTERNAL MEDICINE

## 2022-01-14 PROCEDURE — G8427 DOCREV CUR MEDS BY ELIG CLIN: HCPCS | Performed by: INTERNAL MEDICINE

## 2022-01-14 PROCEDURE — 99204 OFFICE O/P NEW MOD 45 MIN: CPT | Performed by: INTERNAL MEDICINE

## 2022-01-14 PROCEDURE — G8417 CALC BMI ABV UP PARAM F/U: HCPCS | Performed by: INTERNAL MEDICINE

## 2022-01-14 PROCEDURE — 3017F COLORECTAL CA SCREEN DOC REV: CPT | Performed by: INTERNAL MEDICINE

## 2022-01-14 PROCEDURE — 1090F PRES/ABSN URINE INCON ASSESS: CPT | Performed by: INTERNAL MEDICINE

## 2022-01-14 PROCEDURE — G8400 PT W/DXA NO RESULTS DOC: HCPCS | Performed by: INTERNAL MEDICINE

## 2022-01-14 PROCEDURE — G8484 FLU IMMUNIZE NO ADMIN: HCPCS | Performed by: INTERNAL MEDICINE

## 2022-01-14 PROCEDURE — 93000 ELECTROCARDIOGRAM COMPLETE: CPT | Performed by: INTERNAL MEDICINE

## 2022-01-14 PROCEDURE — 1036F TOBACCO NON-USER: CPT | Performed by: INTERNAL MEDICINE

## 2022-01-14 RX ORDER — PREDNISONE 10 MG/1
TABLET ORAL
COMMUNITY
End: 2022-02-08

## 2022-01-14 NOTE — PROGRESS NOTES
Electrophysiology Consult Note      Reason for consultation:  Atrial flutter / ? afib    Chief complaint : Shortness of breath, chest pressure    Referring physician: Chris Moon      Primary care physician: Eulogio Masterson MD      History of Present Illness:      Patient is a 78-year-old female with history of colitis referred by Dr. Marin Chris for atrial flutter. Patient reports she she states she has chest pressure and feels shortness of breath most of the time. She also has dizziness upon standing. She states that her heart races at any time and this causes symptoms mentioned above. And usually the symptoms last for a few minutes. Patient has swelling in the ankles and feet after a long day but goes away in the morning when she sleeps. Patient drinks cup of coffee in the morning. Patient drinks Diet Coke daily        Pastmedical history:   Past Medical History:   Diagnosis Date    Abnormal EKG     Bradycardia     Edema leg     Family history of coronary artery disease     Former smoker     History of cardiac monitoring 10/18/2019    48 hr monitor, Short runs of SVT, few PAC and PVC noted, no significant arrhythmias, no significant pauses.  Hx of cardiovascular stress test 10/17/2019    Fair Exercise Tolerance. No chest pain noted during testing.  Hx of Doppler echocardiogram 10/17/2019    Normal left ventricle structure and systolic function with an ejection fraction of 55-60%. Grade I diastolic dysfunction. Sclerotic, but non-stenotic aortic valve. Mild aortic regurgitation with a pressure half time of 823. Normal pulmonary artery pressure. No evidence of pericardial effusion.     Hyperlipidemia     Paroxysmal atrial flutter ([address] 12/22/2021    Exercise induced 12/22/2021 stress test    Pre-op evaluation 10/2019    PVC's (premature ventricular contractions)     SOB (shortness of breath)        Surgical history :   Past Surgical History:   Procedure Laterality Date    COLOSTOMY N/A 5/9/2019    BOWEL RESECTION COLOSTOMY HARTMANS PROCEDURE INCIDENTAL APPENDECTOMY MULTIPLE SMALL BOWEL SEROSAL REPAIR performed by Mari Mejia MD at [facility]         Family history:   Family History   Problem Relation Age of Onset    Dementia Mother     Cancer Father     Lung Cancer Sister     Colon Cancer Sister        Social history :  reports that she has quit smoking. She has never used smokeless tobacco. She reports current alcohol use. She reports that she does not use drugs. No Known Allergies    Current Outpatient Medications on File Prior to Visit   Medication Sig Dispense Refill    predniSONE (DELTASONE) 10 MG tablet prednisone 10 mg tablet   Taper schedule      apixaban (ELIQUIS) 5 MG TABS tablet Take 1 tablet by mouth 2 times daily Samples Lot# [id number] exp. 8/23 42 tablet 0    melatonin 3 MG TABS tablet Take 20 mg by mouth daily      lansoprazole (PREVACID) 30 MG delayed release capsule TAKE 1 CAPSULE BY MOUTH ONCE DAILY BEFORE A MEAL      ferrous sulfate (IRON 325) 325 (65 Fe) MG tablet Take 325 mg by mouth 3 times daily (with meals)      Cholecalciferol (VITAMIN D3) 1.25 MG (16617 UT) TABS Take by mouth      metoprolol tartrate (LOPRESSOR) 25 MG tablet Take 1 tablet by mouth 2 times daily (Patient taking differently: Take 25 mg by mouth daily ) 180 tablet 3    gabapentin (NEURONTIN) 300 MG capsule Take 300 mg by mouth.  Handicap Placard MISC by Does not apply route 1 each 0    atorvastatin (LIPITOR) 10 MG tablet Take 10 mg by mouth daily       No current facility-administered medications on file prior to visit. Review of Systems:   Review of Systems   Constitutional: Positive for fatigue. Negative for activity change, chills and fever. HENT: Negative for congestion, ear pain and tinnitus. Eyes: Negative for photophobia, pain and visual disturbance.    Respiratory: Positive for shortness of breath. Negative for cough, chest tightness and wheezing. Cardiovascular: Positive for chest pain and palpitations. Negative for leg swelling. Gastrointestinal: Negative for abdominal pain, blood in stool, constipation, diarrhea, nausea and vomiting. Endocrine: Negative for cold intolerance and heat intolerance. Genitourinary: Negative for dysuria, flank pain and hematuria. Musculoskeletal: Positive for arthralgias. Negative for back pain, myalgias and neck stiffness. Skin: Negative for color change and rash. Allergic/Immunologic: Negative for food allergies. Neurological: Negative for dizziness, light-headedness, numbness and headaches. Hematological: Does not bruise/bleed easily. Psychiatric/Behavioral: Negative for agitation, behavioral problems and confusion. Examination:      Vitals:    01/14/22 1058   BP: 110/64   Pulse: 86   Resp: 14   SpO2: 98%   Weight: 223 lb (101.2 kg)   Height: 5' 9\" (1.753 m)        Body mass index is 32.93 kg/m². Physical Exam  Constitutional:       Appearance: Normal appearance. She is not ill-appearing. HENT:      Head: Normocephalic and atraumatic. Mouth/Throat:      Mouth: Mucous membranes are moist.   Eyes:      Conjunctiva/sclera: Conjunctivae normal.   Cardiovascular:      Rate and Rhythm: Normal rate and regular rhythm. Heart sounds: No murmur heard. Pulmonary:      Effort: Pulmonary effort is normal.      Breath sounds: No rales. Abdominal:      General: Abdomen is flat. Palpations: Abdomen is soft. Musculoskeletal:         General: No tenderness. Normal range of motion. Cervical back: Normal range of motion. Right lower leg: No edema. Left lower leg: No edema. Skin:     General: Skin is warm and dry. Neurological:      General: No focal deficit present. Mental Status: She is alert and oriented to person, place, and time.                CBC:   Lab Results   Component Value Date    WBC 7.3 10/04/2021    HGB 10.3 10/04/2021    HCT 33.5 10/04/2021     10/04/2021     Lipids:  Lab Results   Component Value Date    CHOL 131 12/02/2021    TRIG 87 12/02/2021    HDL 45 12/02/2021    LDLCALC 69 12/02/2021     PT/INR: No results found for: INR     BMP:    Lab Results   Component Value Date     12/27/2021    K 3.9 12/27/2021     12/27/2021    CO2 21 12/27/2021    BUN 19 12/27/2021     CMP:   Lab Results   Component Value Date    AST 14 12/27/2021    PROT 6.8 12/27/2021    BILITOT 0.2 12/27/2021    ALKPHOS 69 12/27/2021     TSH:  No results found for: TSH    EKGINTERPRETATION - EKG Interpretation:  SINUS RHYTHM , PVC      IMPRESSION / RECOMMENDATIONS:       Atrial flutter/atrial tachycardia/ - ? Fib  Sleep apnea  History of diverticulitis status surgery post colostomy now she has colitis  PVC  Obesity BMI 32    Patient also having PVCs. Patient is having recurrent atrial flutter/tachycardia episodes cannot rule out atrial fibrillation. Patient is on Eliquis. Patient has Colitis and in the long-term anticoagulation can be an issue in the patient. Discussed with the patient about ablation as an option as patient also has atrial flutter and higher success rate. Patient wants to have general anesthesia and does not want to be awake for the procedure. D    iscussed with the patient pathophysiology of the procedure. We will do the EP study - we will plan for atrial flutter ablation but if patient has A. fib in the setting inducible then I would perform PV isolation at the same time. Discussed with the patient she may need more than one procedure if no arrhythmias induced - as she will be under anesthesia and some times under anesthesia she may not be able to be induced. Patient voiced understanding. 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     Recommend to take metoprolol twice daily        Thanks again for allowing me to participate in care of this patient. Please call me if you have any questions. With best regards. Cassie Clark MD, 1/14/2022 11:09 AM     Please note this report has been partially produced using speech recognition software and may contain errors related to that system including errors in grammar, punctuation, and spelling, as well as words and phrases that may be inappropriate. If there are any questions or concerns please feel free to contact the dictating provider for clarification.

## 2022-01-14 NOTE — PATIENT INSTRUCTIONS
Covid test, procedure paperwork, and pre-testing scheduled on 1/31/2022 at 11:00 am. Procedure scheduled with Dr. Judee Peabody on 2/3/2022 at [facility].

## 2022-01-17 DIAGNOSIS — I48.92 PAROXYSMAL ATRIAL FLUTTER (HCC): Primary | ICD-10-CM

## 2022-01-20 PROBLEM — K52.9 CHRONIC COLITIS: Status: ACTIVE | Noted: 2022-01-20

## 2022-01-22 DIAGNOSIS — K51.90 ULCERATIVE COLITIS, ACUTE, WITHOUT COMPLICATIONS (HCC): ICD-10-CM

## 2022-01-26 ENCOUNTER — TELEPHONE (OUTPATIENT)
Dept: CARDIOLOGY CLINIC | Age: 72
End: 2022-01-26

## 2022-01-26 DIAGNOSIS — I48.91 ATRIAL FIBRILLATION, UNSPECIFIED TYPE (HCC): Primary | ICD-10-CM

## 2022-01-26 PROBLEM — K51.919 ULCERATIVE COLITIS WITH COMPLICATION (HCC): Status: ACTIVE | Noted: 2022-01-26

## 2022-01-26 PROBLEM — K51.90 ULCERATIVE COLITIS, ACUTE, WITHOUT COMPLICATIONS (HCC): Status: ACTIVE | Noted: 2022-01-26

## 2022-01-26 NOTE — TELEPHONE ENCOUNTER
Patient called her Gastroenterologist wants to put her on ENTYVIO she is asking if this will have any effect with her cardiac medications

## 2022-01-26 NOTE — TELEPHONE ENCOUNTER
Called and informed patient the date and time of  CT Cardiac scheduled 1/28/22 at [facility]. Patient unable to keep appointment call central scheduling to reschedule procedure.       [facility] CT CARDIAC [facility]  PREPS:  * Arrive 30 minutes prior to scheduled time  * NPO after midnight

## 2022-01-27 ENCOUNTER — HOSPITAL ENCOUNTER (OUTPATIENT)
Age: 72
Discharge: HOME | End: 2022-01-27
Payer: MEDICARE

## 2022-01-27 ENCOUNTER — HOSPITAL ENCOUNTER (OUTPATIENT)
Dept: GENERAL RADIOLOGY | Age: 72
Discharge: HOME | End: 2022-01-27
Payer: MEDICARE

## 2022-01-27 ENCOUNTER — HOSPITAL ENCOUNTER (OUTPATIENT)
Age: 72
Setting detail: SPECIMEN
Discharge: HOME | End: 2022-01-27
Payer: MEDICARE

## 2022-01-27 ENCOUNTER — NURSE ONLY (OUTPATIENT)
Dept: CARDIOLOGY CLINIC | Age: 72
End: 2022-01-27

## 2022-01-27 DIAGNOSIS — I48.91 ATRIAL FIBRILLATION, UNSPECIFIED TYPE (HCC): ICD-10-CM

## 2022-01-27 DIAGNOSIS — Z01.810 PRE-OPERATIVE CARDIOVASCULAR EXAMINATION: ICD-10-CM

## 2022-01-27 DIAGNOSIS — Z79.01 LONG TERM (CURRENT) USE OF ANTICOAGULANTS: ICD-10-CM

## 2022-01-27 LAB
ANION GAP SERPL CALCULATED.3IONS-SCNC: 9 MMOL/L (ref 4–16)
APTT: 29.3 SECONDS (ref 25.1–37.1)
BASOPHILS ABSOLUTE: 0.1 K/CU MM
BASOPHILS RELATIVE PERCENT: 0.7 % (ref 0–1)
BUN BLDV-MCNC: 11 MG/DL (ref 6–23)
CALCIUM SERPL-MCNC: 9.5 MG/DL (ref 8.3–10.6)
CHLORIDE BLD-SCNC: 102 MMOL/L (ref 99–110)
CO2: 28 MMOL/L (ref 21–32)
CREAT SERPL-MCNC: 0.9 MG/DL (ref 0.6–1.1)
DIFFERENTIAL TYPE: ABNORMAL
EOSINOPHILS ABSOLUTE: 0.4 K/CU MM
EOSINOPHILS RELATIVE PERCENT: 5.4 % (ref 0–3)
GFR AFRICAN AMERICAN: >60 ML/MIN/1.73M2
GFR NON-AFRICAN AMERICAN: >60 ML/MIN/1.73M2
GLUCOSE BLD-MCNC: 93 MG/DL (ref 70–99)
HCT VFR BLD CALC: 40.4 % (ref 37–47)
HEMOGLOBIN: 12.6 GM/DL (ref 12.5–16)
IMMATURE NEUTROPHIL %: 0.9 % (ref 0–0.43)
INR BLD: 1.33 INDEX
LYMPHOCYTES ABSOLUTE: 1.3 K/CU MM
LYMPHOCYTES RELATIVE PERCENT: 15.4 % (ref 24–44)
MAGNESIUM: 2.1 MG/DL (ref 1.8–2.4)
MCH RBC QN AUTO: 28.2 PG (ref 27–31)
MCHC RBC AUTO-ENTMCNC: 31.2 % (ref 32–36)
MCV RBC AUTO: 90.4 FL (ref 78–100)
MONOCYTES ABSOLUTE: 0.6 K/CU MM
MONOCYTES RELATIVE PERCENT: 7.8 % (ref 0–4)
NUCLEATED RBC %: 0 %
PDW BLD-RTO: 15.2 % (ref 11.7–14.9)
PHOSPHORUS: 4 MG/DL (ref 2.5–4.9)
PLATELET # BLD: 406 K/CU MM (ref 140–440)
PMV BLD AUTO: 9.8 FL (ref 7.5–11.1)
POTASSIUM SERPL-SCNC: 4.9 MMOL/L (ref 3.5–5.1)
PROTHROMBIN TIME: 17.2 SECONDS (ref 11.7–14.5)
RBC # BLD: 4.47 M/CU MM (ref 4.2–5.4)
SARS-COV-2: NOT DETECTED
SEGMENTED NEUTROPHILS ABSOLUTE COUNT: 5.7 K/CU MM
SEGMENTED NEUTROPHILS RELATIVE PERCENT: 69.8 % (ref 36–66)
SODIUM BLD-SCNC: 139 MMOL/L (ref 135–145)
SOURCE: NORMAL
TOTAL IMMATURE NEUTOROPHIL: 0.07 K/CU MM
TOTAL NUCLEATED RBC: 0 K/CU MM
WBC # BLD: 8.1 K/CU MM (ref 4–10.5)

## 2022-01-27 PROCEDURE — 36415 COLL VENOUS BLD VENIPUNCTURE: CPT

## 2022-01-27 PROCEDURE — 85610 PROTHROMBIN TIME: CPT

## 2022-01-27 PROCEDURE — 85025 COMPLETE CBC W/AUTO DIFF WBC: CPT

## 2022-01-27 PROCEDURE — 85730 THROMBOPLASTIN TIME PARTIAL: CPT

## 2022-01-27 PROCEDURE — 84100 ASSAY OF PHOSPHORUS: CPT

## 2022-01-27 PROCEDURE — U0003 INFECTIOUS AGENT DETECTION BY NUCLEIC ACID (DNA OR RNA); SEVERE ACUTE RESPIRATORY SYNDROME CORONAVIRUS 2 (SARS-COV-2) (CORONAVIRUS DISEASE [COVID-19]), AMPLIFIED PROBE TECHNIQUE, MAKING USE OF HIGH THROUGHPUT TECHNOLOGIES AS DESCRIBED BY CMS-2020-01-R: HCPCS

## 2022-01-27 PROCEDURE — U0005 INFEC AGEN DETEC AMPLI PROBE: HCPCS

## 2022-01-27 PROCEDURE — 71046 X-RAY EXAM CHEST 2 VIEWS: CPT

## 2022-01-27 PROCEDURE — 80048 BASIC METABOLIC PNL TOTAL CA: CPT

## 2022-01-27 PROCEDURE — 83735 ASSAY OF MAGNESIUM: CPT

## 2022-01-27 NOTE — PROGRESS NOTES
Patient here in office and educated on Atrial Fibrillation Ablatin, schedule for 2/2/2022 @ 2230, with arrival @ [phone], @ [facility]; risk explained; and consents signed. Also copy of orders given for labs and CXR due 1/27/2022 at [facility]. Instruction given to patient to :  NPO after midnight the night before procedure; call hospital at [phone] to pre-register. May take rest of morning meds of procedure except Eliquis and Metoprolol. Hold ALL blood pressure medications morning of procedure-Metoprolol. Hold Eliquis the evening dose night before procedure and Morning dose of the procedure. Prescription for prednisone 32mg 1 tab 12 hours and 2 hours before procedure. Patient voiced understanding. Copies of consent & info scanned in chart. Patient informed of instructions and guidance for performing test.  Throat swab performed. Swab placed into labeled collection tube. Collection tube and lab order placed in plastic bag. Bag placed in biohazard bag and placed in fridge.  called for . Patient instructed to self quarantine until procedure.

## 2022-01-31 ENCOUNTER — HOSPITAL ENCOUNTER (OUTPATIENT)
Age: 72
Discharge: HOME | End: 2022-01-31
Payer: MEDICARE

## 2022-01-31 ENCOUNTER — HOSPITAL ENCOUNTER (OUTPATIENT)
Dept: CT IMAGING | Age: 72
Discharge: HOME | End: 2022-01-31
Payer: MEDICARE

## 2022-01-31 ENCOUNTER — ANESTHESIA EVENT (OUTPATIENT)
Dept: CARDIAC CATH/INVASIVE PROCEDURES | Age: 72
End: 2022-01-31
Payer: MEDICARE

## 2022-01-31 DIAGNOSIS — I48.91 ATRIAL FIBRILLATION, UNSPECIFIED TYPE (HCC): ICD-10-CM

## 2022-01-31 LAB — HEPATITIS B SURFACE ANTIGEN: NON REACTIVE

## 2022-01-31 PROCEDURE — 6360000004 HC RX CONTRAST MEDICATION: Performed by: INTERNAL MEDICINE

## 2022-01-31 PROCEDURE — 75574 CT ANGIO HRT W/3D IMAGE: CPT | Performed by: INTERNAL MEDICINE

## 2022-01-31 PROCEDURE — 75574 CT ANGIO HRT W/3D IMAGE: CPT

## 2022-01-31 PROCEDURE — 36415 COLL VENOUS BLD VENIPUNCTURE: CPT

## 2022-01-31 PROCEDURE — 87340 HEPATITIS B SURFACE AG IA: CPT

## 2022-01-31 RX ADMIN — IOPAMIDOL 120 ML: 755 INJECTION, SOLUTION INTRAVENOUS at 11:17

## 2022-01-31 NOTE — ANESTHESIA PRE PROCEDURE
Department of Anesthesiology  Preprocedure Note       Name:  Michelle Lopez   Age:  80 y.o.  :  1950                                          MRN:  [id number]         Date:  2022      Surgeon: * Surgery not found *    Procedure:     Medications prior to admission:   Prior to Admission medications    Medication Sig Start Date End Date Taking? Authorizing Provider   metoprolol tartrate (LOPRESSOR) 25 MG tablet Take 1 tablet by mouth 2 times daily 22   FABIEN Nath - CNP   apixaban (ELIQUIS) 5 MG TABS tablet Take 1 tablet by mouth 2 times daily Samples Lot# [id number] exp. 22   Mateus Gamboa MD   predniSONE (DELTASONE) 10 MG tablet prednisone 10 mg tablet   Taper schedule    Historical Provider, MD   melatonin 3 MG TABS tablet Take 20 mg by mouth daily    Historical Provider, MD   lansoprazole (PREVACID) 30 MG delayed release capsule TAKE 1 CAPSULE BY MOUTH ONCE DAILY BEFORE A MEAL 2/15/21   Historical Provider, MD   ferrous sulfate (IRON 325) 325 (65 Fe) MG tablet Take 325 mg by mouth 3 times daily (with meals)    Historical Provider, MD   Cholecalciferol (VITAMIN D3) 1.25 MG (36703 UT) TABS Take by mouth    Historical Provider, MD   gabapentin (NEURONTIN) 300 MG capsule Take 300 mg by mouth.  20   Historical Provider, MD   Handsowmya Hoff [address] [facility] by Does not apply route 19   Xiomy Gonsalves MD   atorvastatin (LIPITOR) 10 MG tablet Take 10 mg by mouth daily    Historical Provider, MD       Current medications:    Current Outpatient Medications   Medication Sig Dispense Refill    metoprolol tartrate (LOPRESSOR) 25 MG tablet Take 1 tablet by mouth 2 times daily 180 tablet 3    apixaban (ELIQUIS) 5 MG TABS tablet Take 1 tablet by mouth 2 times daily Samples Lot# [id number] exp.  42 tablet 0    predniSONE (DELTASONE) 10 MG tablet prednisone 10 mg tablet   Taper schedule      melatonin 3 MG TABS tablet Take 20 mg by mouth daily      lansoprazole (PREVACID) 30 MG delayed release capsule TAKE 1 CAPSULE BY MOUTH ONCE DAILY BEFORE A MEAL      ferrous sulfate (IRON 325) 325 (65 Fe) MG tablet Take 325 mg by mouth 3 times daily (with meals)      Cholecalciferol (VITAMIN D3) 1.25 MG (69644 UT) TABS Take by mouth      gabapentin (NEURONTIN) 300 MG capsule Take 300 mg by mouth.  Handicap Placard MISC by Does not apply route 1 each 0    atorvastatin (LIPITOR) 10 MG tablet Take 10 mg by mouth daily       No current facility-administered medications for this encounter. Allergies:  No Known Allergies    Problem List:    Patient Active Problem List   Diagnosis Code    Large bowel obstruction (HCC) K56.609    Elevated lactic acid level R79.89    Post-op pain G89.18    Chronic venous insufficiency I87.2    Varicose veins of lower extremities with complications, bilateral I83.893    SOB (shortness of breath) R06.02    Bradycardia R00.1    Abnormal EKG R94.31    Status post endovenous radiofrequency ablation (RFA) of saphenous vein Z98.890    S/P colostomy takedown Z98.890    Painless rectal bleeding K62.5    Precordial pain R07.2    Paroxysmal atrial flutter (HCC) I48.92    Chronic colitis K52.9    Ulcerative colitis with complication (HCC) X85.315    Ulcerative colitis, acute, without complications (HCC) W60.69       Past Medical History:        Diagnosis Date    Abnormal EKG     Bradycardia     Chronic colitis 1/20/2022    Edema leg     Family history of coronary artery disease     Former smoker     History of cardiac monitoring 10/18/2019    48 hr monitor, Short runs of SVT, few PAC and PVC noted, no significant arrhythmias, no significant pauses.  Hx of cardiovascular stress test 10/17/2019    Fair Exercise Tolerance. No chest pain noted during testing.  Hx of Doppler echocardiogram 10/17/2019    Normal left ventricle structure and systolic function with an ejection fraction of 55-60%. Grade I diastolic dysfunction. Sclerotic, but non-stenotic aortic valve. Mild aortic regurgitation with a pressure half time of 823. Normal pulmonary artery pressure. No evidence of pericardial effusion.  Hyperlipidemia     Paroxysmal atrial flutter ([address] 12/22/2021    Exercise induced 12/22/2021 stress test    Pre-op evaluation 10/2019    PVC's (premature ventricular contractions)     SOB (shortness of breath)     Ulcerative colitis with complication ([address] 9/79/2655    Ulcerative colitis, acute, without complications ([address] 1/16/9698       Past Surgical History:        Procedure Laterality Date    COLOSTOMY N/A 5/9/2019    BOWEL RESECTION COLOSTOMY HARTMANS PROCEDURE INCIDENTAL APPENDECTOMY MULTIPLE SMALL BOWEL SEROSAL REPAIR performed by Avelino Snowden MD at [facility] 284         Social History:    Social History     Tobacco Use    Smoking status: Former Smoker    Smokeless tobacco: Never Used   Substance Use Topics    Alcohol use: Yes     Comment: once a week                                Counseling given: Not Answered      Vital Signs (Current): There were no vitals filed for this visit.                                            BP Readings from Last 3 Encounters:   01/14/22 110/64   12/22/21 116/62   12/17/21 106/64       NPO Status:                                                                                 BMI:   Wt Readings from Last 3 Encounters:   01/14/22 223 lb (101.2 kg)   12/22/21 223 lb (101.2 kg)   10/04/21 223 lb (101.2 kg)     There is no height or weight on file to calculate BMI.    CBC:   Lab Results   Component Value Date    WBC 8.1 01/27/2022    RBC 4.47 01/27/2022    HGB 12.6 01/27/2022    HCT 40.4 01/27/2022    MCV 90.4 01/27/2022    RDW 15.2 01/27/2022     01/27/2022       CMP:   Lab Results   Component Value Date     01/27/2022    K 4.9 01/27/2022     01/27/2022    CO2 28 01/27/2022    BUN 11 01/27/2022    CREATININE 0.9 01/27/2022    GFRAA >60 01/27/2022    AGRATIO 1.4 12/27/2021    LABGLOM >60 01/27/2022    GLUCOSE 93 01/27/2022    PROT 6.8 12/27/2021    CALCIUM 9.5 01/27/2022    BILITOT 0.2 12/27/2021    ALKPHOS 69 12/27/2021    AST 14 12/27/2021    ALT 21 12/27/2021       POC Tests: No results for input(s): POCGLU, POCNA, POCK, POCCL, POCBUN, POCHEMO, POCHCT in the last 72 hours. Coags:   Lab Results   Component Value Date    PROTIME 17.2 01/27/2022    INR 1.33 01/27/2022    APTT 29.3 01/27/2022       HCG (If Applicable): No results found for: PREGTESTUR, PREGSERUM, HCG, HCGQUANT     ABGs: No results found for: PHART, PO2ART, SZC3KST, LAY7IOI, BEART, Y0PLVPTU     Type & Screen (If Applicable):  No results found for: LABABO, LABRH    Drug/Infectious Status (If Applicable):  No results found for: HIV, HEPCAB    COVID-19 Screening (If Applicable):   Lab Results   Component Value Date    COVID19 NOT DETECTED 01/27/2022           Anesthesia Evaluation    Airway: Mallampati: III  TM distance: >3 FB   Neck ROM: full  Mouth opening: > = 3 FB Dental:    (+) bridge      Pulmonary:normal exam    (+) sleep apnea:                            ROS comment: Former smoker   Cardiovascular:    (+) dysrhythmias: atrial fibrillation, hyperlipidemia         Beta Blocker:  Not on Beta Blocker         Neuro/Psych:   Negative Neuro/Psych ROS              GI/Hepatic/Renal:   (+) PUD,          ROS comment: Ulcerative colitis. Endo/Other: Negative Endo/Other ROS                    Abdominal:   (+) obese,           Vascular: negative vascular ROS. Other Findings:           Anesthesia Plan      general     ASA 3       Induction: intravenous. arterial line  MIPS: Prophylactic antiemetics administered. Anesthetic plan and risks discussed with patient. Plan discussed with CRNA. Attending anesthesiologist reviewed and agrees with Pre Eval content    Pre Anesthesia Assessment complete. Chart reviewed on 1/31/2022. This is a chart review only.       Rafat Hook DO 1/31/2022

## 2022-02-01 NOTE — PROCEDURES
CARDIAC FINDINGS:   Cardiac CT scanning was performed, CT angiography during contrast    administration using a 64. Scanner using a slice thickness of 0.5 mm and    Gantry rotation time of 600 ms. Scanning was performed using 110 K  using auto adjustment of mA   Adjustment achieved low dose, retrospective gating was used   Contrast Type: isovue 370    Contrast used: 1cc/lb Max 100 cc   Dose length Product use: 1171.99        Scan length: Excellent       Left Main Coronary artery:   Left main originates normally from the left coronary sinus and gives rise    to the left anterior descending artery and left circumflex artery. It is    free of any significant atherosclerotic disease.        Left anterior descending artery:   Left anterior descending artery is a normal caliber vessel that wraps    around the apex and gives off 2 small  diagonal branches. The diagonal    branches are free of any significant atherosclerotic disease. There iso    calcified plaque noted the ostial LAD which about 6070% stenosis.  Mid LAD    at the takeoff is another eccentric calcification about of 50 to 70%    stenosis there are minor luminal irregularities noted.       Left circumflex artery:   Left circumflex artery is a actually smaller size vessel that gives off a    marginal branch. The left circumflex artery is free of any significant    atherosclerotic disease.        Right coronary artery:   Right coronary artery originates from the right coronary sinus. It seems    to be the dominant vessel and gives rise to the posterior descending    artery and posterior ventricular artery. The RCA is moderate size vessel.     Posterior descending artery and posterior lateral branches are free of any    significant disease.       Pericardium:   Pericardium is normal in appearance without any thickening, calcification    or pericardial fluid.       Cardiac structures: The estimated ejection fraction is: 68%.  The ascending aorta is measured at 31.9 mm       1. Four pulmonary veins drain into the left atrium with the following    ostial dimensions: RUPV 10.5mm; RLPV 11.6 mm; LUPV 13.6 mm; LLPV 11.4*mm.        2. There is no filling defect in the THUY on delayed imaging. Unable to    exclude    THUY thrombus.        4. Coronary and aortic calcifications noted.            NON-CARDIAC FINDINGS - []           Impression   Normal pulmonary vein angiogram. NO THUY thrombus.     Normal ejection fraction of 68%   Ostial LAD and mid LAD has 60-70% eccentric lesion no other significant    obstructive disease

## 2022-02-02 ENCOUNTER — HOSPITAL ENCOUNTER (OUTPATIENT)
Dept: CARDIAC CATH/INVASIVE PROCEDURES | Age: 72
Discharge: HOME | End: 2022-02-02
Attending: INTERNAL MEDICINE | Admitting: INTERNAL MEDICINE
Payer: MEDICARE

## 2022-02-02 ENCOUNTER — ANESTHESIA (OUTPATIENT)
Dept: CARDIAC CATH/INVASIVE PROCEDURES | Age: 72
End: 2022-02-02
Payer: MEDICARE

## 2022-02-02 VITALS
BODY MASS INDEX: 33.03 KG/M2 | WEIGHT: 223 LBS | DIASTOLIC BLOOD PRESSURE: 89 MMHG | RESPIRATION RATE: 18 BRPM | SYSTOLIC BLOOD PRESSURE: 139 MMHG | HEART RATE: 99 BPM | OXYGEN SATURATION: 100 % | HEIGHT: 69 IN | TEMPERATURE: 97.1 F

## 2022-02-02 VITALS
RESPIRATION RATE: 2 BRPM | OXYGEN SATURATION: 100 % | TEMPERATURE: 97 F | DIASTOLIC BLOOD PRESSURE: 69 MMHG | SYSTOLIC BLOOD PRESSURE: 124 MMHG

## 2022-02-02 DIAGNOSIS — Z01.810 ENCOUNTER FOR PREPROCEDURAL CARDIOVASCULAR EXAMINATION: ICD-10-CM

## 2022-02-02 DIAGNOSIS — R93.89 ABNORMAL CT SCAN, CHEST: Primary | ICD-10-CM

## 2022-02-02 LAB
ABO/RH: NORMAL
ACTIVATED CLOTTING TIME, LOW RANGE: 152 SEC
ACTIVATED CLOTTING TIME, LOW RANGE: 193 SEC
ANTIBODY SCREEN: NEGATIVE
BASE EXCESS MIXED: 0.2 (ref 0–2.3)
BASE EXCESS: ABNORMAL (ref 0–2.4)
CO2: 26 MMOL/L (ref 21–32)
EKG ATRIAL RATE: 94 BPM
EKG DIAGNOSIS: NORMAL
EKG P AXIS: 65 DEGREES
EKG P-R INTERVAL: 200 MS
EKG Q-T INTERVAL: 382 MS
EKG QRS DURATION: 82 MS
EKG QTC CALCULATION (BAZETT): 477 MS
EKG R AXIS: 26 DEGREES
EKG T AXIS: 37 DEGREES
EKG VENTRICULAR RATE: 94 BPM
GLUCOSE BLD-MCNC: 92 MG/DL (ref 70–99)
HCO3 ARTERIAL: 24.3 MMOL/L (ref 18–23)
HCT VFR BLD CALC: 32 % (ref 37–47)
HEMOGLOBIN: 11 GM/DL (ref 12.5–16)
O2 SATURATION: 100 % (ref 96–97)
PCO2 ARTERIAL: 36.7 MMHG (ref 32–45)
PH BLOOD: 7.43 (ref 7.34–7.45)
PO2 ARTERIAL: 380 MMHG (ref 75–100)
POC CALCIUM: 1.22 MMOL/L (ref 1.12–1.32)
POC CHLORIDE: 108 MMOL/L (ref 98–109)
POC CREATININE: 1 MG/DL (ref 0.6–1.1)
POTASSIUM SERPL-SCNC: 3.9 MMOL/L (ref 3.5–4.5)
SODIUM BLD-SCNC: 144 MMOL/L (ref 138–146)
SOURCE, BLOOD GAS: ABNORMAL

## 2022-02-02 PROCEDURE — C1732 CATH, EP, DIAG/ABL, 3D/VECT: HCPCS

## 2022-02-02 PROCEDURE — 80051 ELECTROLYTE PANEL: CPT

## 2022-02-02 PROCEDURE — C1893 INTRO/SHEATH, FIXED,NON-PEEL: HCPCS

## 2022-02-02 PROCEDURE — 93653 COMPRE EP EVAL TX SVT: CPT

## 2022-02-02 PROCEDURE — 3700000000 HC ANESTHESIA ATTENDED CARE

## 2022-02-02 PROCEDURE — C1733 CATH, EP, OTHR THAN COOL-TIP: HCPCS

## 2022-02-02 PROCEDURE — 93312 ECHO TRANSESOPHAGEAL: CPT | Performed by: INTERNAL MEDICINE

## 2022-02-02 PROCEDURE — 86850 RBC ANTIBODY SCREEN: CPT

## 2022-02-02 PROCEDURE — 2580000003 HC RX 258

## 2022-02-02 PROCEDURE — 82565 ASSAY OF CREATININE: CPT

## 2022-02-02 PROCEDURE — 6360000002 HC RX W HCPCS

## 2022-02-02 PROCEDURE — 82962 GLUCOSE BLOOD TEST: CPT

## 2022-02-02 PROCEDURE — 82803 BLOOD GASES ANY COMBINATION: CPT

## 2022-02-02 PROCEDURE — 85018 HEMOGLOBIN: CPT

## 2022-02-02 PROCEDURE — 93325 DOPPLER ECHO COLOR FLOW MAPG: CPT | Performed by: INTERNAL MEDICINE

## 2022-02-02 PROCEDURE — 86900 BLOOD TYPING SEROLOGIC ABO: CPT

## 2022-02-02 PROCEDURE — C1894 INTRO/SHEATH, NON-LASER: HCPCS

## 2022-02-02 PROCEDURE — 93010 ELECTROCARDIOGRAM REPORT: CPT | Performed by: INTERNAL MEDICINE

## 2022-02-02 PROCEDURE — 93653 COMPRE EP EVAL TX SVT: CPT | Performed by: INTERNAL MEDICINE

## 2022-02-02 PROCEDURE — 86901 BLOOD TYPING SEROLOGIC RH(D): CPT

## 2022-02-02 PROCEDURE — C1730 CATH, EP, 19 OR FEW ELECT: HCPCS

## 2022-02-02 PROCEDURE — 7100000000 HC PACU RECOVERY - FIRST 15 MIN

## 2022-02-02 PROCEDURE — 85347 COAGULATION TIME ACTIVATED: CPT

## 2022-02-02 PROCEDURE — 2500000003 HC RX 250 WO HCPCS

## 2022-02-02 PROCEDURE — 93005 ELECTROCARDIOGRAM TRACING: CPT | Performed by: INTERNAL MEDICINE

## 2022-02-02 PROCEDURE — 7100000001 HC PACU RECOVERY - ADDTL 15 MIN

## 2022-02-02 PROCEDURE — 2709999900 HC NON-CHARGEABLE SUPPLY

## 2022-02-02 PROCEDURE — 3700000001 HC ADD 15 MINUTES (ANESTHESIA)

## 2022-02-02 PROCEDURE — 85014 HEMATOCRIT: CPT

## 2022-02-02 PROCEDURE — 93623 PRGRMD STIMJ&PACG IV RX NFS: CPT

## 2022-02-02 PROCEDURE — 93312 ECHO TRANSESOPHAGEAL: CPT

## 2022-02-02 PROCEDURE — 93623 PRGRMD STIMJ&PACG IV RX NFS: CPT | Performed by: INTERNAL MEDICINE

## 2022-02-02 RX ORDER — HYDROMORPHONE HCL 110MG/55ML
0.5 PATIENT CONTROLLED ANALGESIA SYRINGE INTRAVENOUS EVERY 5 MIN PRN
Status: DISCONTINUED | OUTPATIENT
Start: 2022-02-02 | End: 2022-02-02 | Stop reason: HOSPADM

## 2022-02-02 RX ORDER — LIDOCAINE HYDROCHLORIDE 20 MG/ML
INJECTION, SOLUTION EPIDURAL; INFILTRATION; INTRACAUDAL; PERINEURAL PRN
Status: DISCONTINUED | OUTPATIENT
Start: 2022-02-02 | End: 2022-02-02 | Stop reason: SDUPTHER

## 2022-02-02 RX ORDER — FENTANYL CITRATE 50 UG/ML
INJECTION, SOLUTION INTRAMUSCULAR; INTRAVENOUS PRN
Status: DISCONTINUED | OUTPATIENT
Start: 2022-02-02 | End: 2022-02-02 | Stop reason: SDUPTHER

## 2022-02-02 RX ORDER — SODIUM CHLORIDE 9 MG/ML
INJECTION, SOLUTION INTRAVENOUS CONTINUOUS PRN
Status: DISCONTINUED | OUTPATIENT
Start: 2022-02-02 | End: 2022-02-02 | Stop reason: SDUPTHER

## 2022-02-02 RX ORDER — ONDANSETRON 2 MG/ML
INJECTION INTRAMUSCULAR; INTRAVENOUS PRN
Status: DISCONTINUED | OUTPATIENT
Start: 2022-02-02 | End: 2022-02-02 | Stop reason: SDUPTHER

## 2022-02-02 RX ORDER — MEPERIDINE HYDROCHLORIDE 25 MG/ML
12.5 INJECTION INTRAMUSCULAR; INTRAVENOUS; SUBCUTANEOUS EVERY 5 MIN PRN
Status: DISCONTINUED | OUTPATIENT
Start: 2022-02-02 | End: 2022-02-02 | Stop reason: HOSPADM

## 2022-02-02 RX ORDER — HYDRALAZINE HYDROCHLORIDE 20 MG/ML
5 INJECTION INTRAMUSCULAR; INTRAVENOUS EVERY 10 MIN PRN
Status: DISCONTINUED | OUTPATIENT
Start: 2022-02-02 | End: 2022-02-02 | Stop reason: HOSPADM

## 2022-02-02 RX ORDER — DEXAMETHASONE SODIUM PHOSPHATE 4 MG/ML
INJECTION, SOLUTION INTRA-ARTICULAR; INTRALESIONAL; INTRAMUSCULAR; INTRAVENOUS; SOFT TISSUE PRN
Status: DISCONTINUED | OUTPATIENT
Start: 2022-02-02 | End: 2022-02-02 | Stop reason: SDUPTHER

## 2022-02-02 RX ORDER — 0.9 % SODIUM CHLORIDE 0.9 %
500 INTRAVENOUS SOLUTION INTRAVENOUS
Status: DISCONTINUED | OUTPATIENT
Start: 2022-02-02 | End: 2022-02-02 | Stop reason: HOSPADM

## 2022-02-02 RX ORDER — DIPHENHYDRAMINE HYDROCHLORIDE 50 MG/ML
12.5 INJECTION INTRAMUSCULAR; INTRAVENOUS
Status: DISCONTINUED | OUTPATIENT
Start: 2022-02-02 | End: 2022-02-02 | Stop reason: HOSPADM

## 2022-02-02 RX ORDER — PROPOFOL 10 MG/ML
INJECTION, EMULSION INTRAVENOUS PRN
Status: DISCONTINUED | OUTPATIENT
Start: 2022-02-02 | End: 2022-02-02 | Stop reason: SDUPTHER

## 2022-02-02 RX ORDER — HYDROCODONE BITARTRATE AND ACETAMINOPHEN 5; 325 MG/1; MG/1
1 TABLET ORAL PRN
Status: DISCONTINUED | OUTPATIENT
Start: 2022-02-02 | End: 2022-02-02 | Stop reason: HOSPADM

## 2022-02-02 RX ORDER — FENTANYL CITRATE 50 UG/ML
50 INJECTION, SOLUTION INTRAMUSCULAR; INTRAVENOUS EVERY 5 MIN PRN
Status: DISCONTINUED | OUTPATIENT
Start: 2022-02-02 | End: 2022-02-02 | Stop reason: HOSPADM

## 2022-02-02 RX ORDER — ONDANSETRON 2 MG/ML
4 INJECTION INTRAMUSCULAR; INTRAVENOUS
Status: DISCONTINUED | OUTPATIENT
Start: 2022-02-02 | End: 2022-02-02 | Stop reason: HOSPADM

## 2022-02-02 RX ORDER — ROCURONIUM BROMIDE 10 MG/ML
INJECTION, SOLUTION INTRAVENOUS PRN
Status: DISCONTINUED | OUTPATIENT
Start: 2022-02-02 | End: 2022-02-02 | Stop reason: SDUPTHER

## 2022-02-02 RX ORDER — LABETALOL HYDROCHLORIDE 5 MG/ML
5 INJECTION, SOLUTION INTRAVENOUS EVERY 10 MIN PRN
Status: DISCONTINUED | OUTPATIENT
Start: 2022-02-02 | End: 2022-02-02 | Stop reason: HOSPADM

## 2022-02-02 RX ORDER — HYDROCODONE BITARTRATE AND ACETAMINOPHEN 5; 325 MG/1; MG/1
2 TABLET ORAL EVERY 6 HOURS PRN
Status: DISCONTINUED | OUTPATIENT
Start: 2022-02-02 | End: 2022-02-02 | Stop reason: HOSPADM

## 2022-02-02 RX ORDER — PROMETHAZINE HYDROCHLORIDE 25 MG/ML
6.25 INJECTION, SOLUTION INTRAMUSCULAR; INTRAVENOUS
Status: DISCONTINUED | OUTPATIENT
Start: 2022-02-02 | End: 2022-02-02 | Stop reason: HOSPADM

## 2022-02-02 RX ADMIN — SUGAMMADEX 200 MG: 100 INJECTION, SOLUTION INTRAVENOUS at 10:48

## 2022-02-02 RX ADMIN — FENTANYL CITRATE 50 MCG: 50 INJECTION, SOLUTION INTRAMUSCULAR; INTRAVENOUS at 07:44

## 2022-02-02 RX ADMIN — PROPOFOL 30 MG: 10 INJECTION, EMULSION INTRAVENOUS at 07:55

## 2022-02-02 RX ADMIN — ROCURONIUM BROMIDE 20 MG: 10 INJECTION INTRAVENOUS at 10:01

## 2022-02-02 RX ADMIN — PHENYLEPHRINE HYDROCHLORIDE 40 MCG/MIN: 10 INJECTION INTRAVENOUS at 09:05

## 2022-02-02 RX ADMIN — SODIUM CHLORIDE: 900 INJECTION INTRAVENOUS at 07:34

## 2022-02-02 RX ADMIN — LIDOCAINE HYDROCHLORIDE 100 MG: 20 INJECTION, SOLUTION EPIDURAL; INFILTRATION; INTRACAUDAL; PERINEURAL at 07:46

## 2022-02-02 RX ADMIN — DEXAMETHASONE SODIUM PHOSPHATE 4 MG: 4 INJECTION, SOLUTION INTRAMUSCULAR; INTRAVENOUS at 08:30

## 2022-02-02 RX ADMIN — PROPOFOL 150 MG: 10 INJECTION, EMULSION INTRAVENOUS at 07:46

## 2022-02-02 RX ADMIN — ONDANSETRON 4 MG: 2 INJECTION INTRAMUSCULAR; INTRAVENOUS at 10:48

## 2022-02-02 RX ADMIN — ROCURONIUM BROMIDE 20 MG: 10 INJECTION INTRAVENOUS at 09:18

## 2022-02-02 RX ADMIN — ROCURONIUM BROMIDE 50 MG: 10 INJECTION INTRAVENOUS at 07:46

## 2022-02-02 RX ADMIN — FENTANYL CITRATE 50 MCG: 50 INJECTION, SOLUTION INTRAMUSCULAR; INTRAVENOUS at 08:43

## 2022-02-02 ASSESSMENT — PULMONARY FUNCTION TESTS
PIF_VALUE: 18
PIF_VALUE: 17
PIF_VALUE: 17
PIF_VALUE: 18
PIF_VALUE: 17
PIF_VALUE: 19
PIF_VALUE: 20
PIF_VALUE: 17
PIF_VALUE: 19
PIF_VALUE: 0
PIF_VALUE: 19
PIF_VALUE: 16
PIF_VALUE: 19
PIF_VALUE: 18
PIF_VALUE: 18
PIF_VALUE: 1
PIF_VALUE: 17
PIF_VALUE: 19
PIF_VALUE: 16
PIF_VALUE: 18
PIF_VALUE: 17
PIF_VALUE: 18
PIF_VALUE: 19
PIF_VALUE: 19
PIF_VALUE: 17
PIF_VALUE: 19
PIF_VALUE: 19
PIF_VALUE: 20
PIF_VALUE: 22
PIF_VALUE: 19
PIF_VALUE: 17
PIF_VALUE: 17
PIF_VALUE: 19
PIF_VALUE: 15
PIF_VALUE: 18
PIF_VALUE: 19
PIF_VALUE: 17
PIF_VALUE: 20
PIF_VALUE: 18
PIF_VALUE: 17
PIF_VALUE: 2
PIF_VALUE: 1
PIF_VALUE: 18
PIF_VALUE: 17
PIF_VALUE: 17
PIF_VALUE: 18
PIF_VALUE: 19
PIF_VALUE: 17
PIF_VALUE: 1
PIF_VALUE: 19
PIF_VALUE: 18
PIF_VALUE: 17
PIF_VALUE: 17
PIF_VALUE: 19
PIF_VALUE: 19
PIF_VALUE: 18
PIF_VALUE: 18
PIF_VALUE: 1
PIF_VALUE: 18
PIF_VALUE: 17
PIF_VALUE: 19
PIF_VALUE: 17
PIF_VALUE: 17
PIF_VALUE: 19
PIF_VALUE: 17
PIF_VALUE: 19
PIF_VALUE: 19
PIF_VALUE: 18
PIF_VALUE: 17
PIF_VALUE: 17
PIF_VALUE: 18
PIF_VALUE: 17
PIF_VALUE: 18
PIF_VALUE: 17
PIF_VALUE: 16
PIF_VALUE: 19
PIF_VALUE: 18
PIF_VALUE: 22
PIF_VALUE: 17
PIF_VALUE: 0
PIF_VALUE: 8
PIF_VALUE: 17
PIF_VALUE: 18
PIF_VALUE: 16
PIF_VALUE: 18
PIF_VALUE: 17
PIF_VALUE: 1
PIF_VALUE: 19
PIF_VALUE: 18
PIF_VALUE: 18
PIF_VALUE: 19
PIF_VALUE: 17
PIF_VALUE: 17
PIF_VALUE: 8
PIF_VALUE: 18
PIF_VALUE: 17
PIF_VALUE: 17
PIF_VALUE: 15
PIF_VALUE: 18
PIF_VALUE: 19
PIF_VALUE: 19
PIF_VALUE: 18
PIF_VALUE: 18
PIF_VALUE: 0
PIF_VALUE: 1
PIF_VALUE: 17
PIF_VALUE: 16
PIF_VALUE: 17
PIF_VALUE: 19
PIF_VALUE: 17
PIF_VALUE: 17
PIF_VALUE: 19
PIF_VALUE: 19
PIF_VALUE: 17
PIF_VALUE: 18
PIF_VALUE: 1
PIF_VALUE: 18
PIF_VALUE: 17
PIF_VALUE: 16
PIF_VALUE: 18
PIF_VALUE: 1
PIF_VALUE: 17
PIF_VALUE: 19
PIF_VALUE: 19
PIF_VALUE: 1
PIF_VALUE: 17
PIF_VALUE: 18
PIF_VALUE: 19
PIF_VALUE: 20
PIF_VALUE: 18
PIF_VALUE: 19
PIF_VALUE: 17
PIF_VALUE: 19
PIF_VALUE: 3
PIF_VALUE: 17
PIF_VALUE: 18
PIF_VALUE: 19
PIF_VALUE: 12
PIF_VALUE: 17
PIF_VALUE: 18
PIF_VALUE: 16
PIF_VALUE: 16
PIF_VALUE: 17
PIF_VALUE: 16
PIF_VALUE: 17
PIF_VALUE: 18
PIF_VALUE: 19
PIF_VALUE: 17
PIF_VALUE: 18
PIF_VALUE: 19
PIF_VALUE: 17
PIF_VALUE: 18
PIF_VALUE: 16
PIF_VALUE: 18
PIF_VALUE: 19
PIF_VALUE: 18
PIF_VALUE: 19
PIF_VALUE: 5
PIF_VALUE: 17
PIF_VALUE: 17
PIF_VALUE: 19
PIF_VALUE: 2
PIF_VALUE: 1
PIF_VALUE: 18
PIF_VALUE: 16
PIF_VALUE: 17
PIF_VALUE: 19
PIF_VALUE: 18
PIF_VALUE: 17
PIF_VALUE: 17
PIF_VALUE: 18
PIF_VALUE: 3
PIF_VALUE: 19
PIF_VALUE: 20
PIF_VALUE: 18
PIF_VALUE: 2
PIF_VALUE: 17
PIF_VALUE: 18

## 2022-02-02 ASSESSMENT — ENCOUNTER SYMPTOMS
EYE PAIN: 0
DIARRHEA: 0
PHOTOPHOBIA: 0
BACK PAIN: 0
WHEEZING: 0
NAUSEA: 0
COUGH: 0
CHEST TIGHTNESS: 0
CONSTIPATION: 0
COLOR CHANGE: 0
ABDOMINAL PAIN: 0
VOMITING: 0
BLOOD IN STOOL: 0
SHORTNESS OF BREATH: 1

## 2022-02-02 NOTE — PROGRESS NOTES
1118\" Arrived to PACU from EP lab. Monitors applied, alarms on. Report obtained from [facility].  3868: Sheaths x2 pulled left groin per Soraida Buchanan RN, davie pressure held. 1140: EKG done. 1142: Hemostasis left groin. Dressing applied. 1145: Sheaths x 2 pulled right groin per [facility]. 1205: Hemostasis right groin. Dressing applied. C/o throat discomfort. Ice chips given, explained about having ET tube in. Understanding voiced. C/o left arm from hand to elbow hurting. Reassurance given. Art line has been removed, palpable left radial pulse. Wiggles fingers, states sensation normal, less than 3 sec cap refill. States discomfort around a 4.  1210: Dr. Rico Mccracken at bedside examining left arm. Instructed patient will probably be sore from having art line in but if soreness continues once home to let Dr. Eli Cai know. Understanding voiced. 1220: States is having slight discomfort right chest that she rates at a 2. Reassurance given. 1228: Transported to cath lab hold room 2. Visitor at bedside.

## 2022-02-02 NOTE — PROGRESS NOTES
Pt stood at side of bed used bedside commode. jenny groin  drsg dry and intact no hematoma.   Dc instructions reviewed with pt and family pt verbalizes understanding

## 2022-02-02 NOTE — PLAN OF CARE
Patient states she has severe colitis and is needing a bedside commode at all times next to her bed due to her frequency of stool. Unable to access right arm for a second IV at this time and patient is unwilling to allow for additional tries. Patient also does not want her echols catheter placed under after anesthesia. Continuing to provide emotional support to the patient and call light is in reach.  [facility] 2/2/2022

## 2022-02-02 NOTE — ANESTHESIA POSTPROCEDURE EVALUATION
Department of Anesthesiology  Postprocedure Note    Patient: Michelene Pallas  MRN: [id number]  YOB: 1950  Date of evaluation: 2/2/2022  Time:  11:41 AM     Procedure Summary     Date: 02/02/22 Room / Location: [facility] Cath Lab    Anesthesia Start: 9902 Anesthesia Stop: 1634    Procedure: [facility] EP AFIB ABLATION W ANESTH Diagnosis: Paroxysmal atrial fibrillation    Scheduled Providers:  Responsible Provider: Izabella Coleman MD    Anesthesia Type: general ASA Status: 3          Anesthesia Type: general    Sheila Phase I: Sheila Score: 8    Sheila Phase II:      Last vitals: Reviewed and per EMR flowsheets.        Anesthesia Post Evaluation    Patient location during evaluation: PACU  Patient participation: complete - patient participated  Level of consciousness: sleepy but conscious  Airway patency: patent  Nausea & Vomiting: no nausea  Complications: no  Cardiovascular status: hemodynamically stable  Respiratory status: acceptable  Hydration status: euvolemic

## 2022-02-02 NOTE — PROCEDURES
Bethel Chaudhary   80 y.o., female  1950 2/2/2022    Attending: Fior Tatum MD    Sedation : Anesthesia                        Indication:        Pre-atrial flutter ablation                                                                                                                                                  After obtaining the informed cosent. Pt brought to EP lab. Sedation per anesthesia . After proper sedation GAYATRI performed. US Doppler was used to assess abnormalities where appropriate. Post procedure pt is stable. Findings:  LV=  LVEF appears normal  LA=  normal  THUY= NO CLOTS  RV= normal  RA=  normal  IAS= Normal  Bubble study=not done for PFO or ASD  AV= trivial regurgitation and no stenosis  MV= mild regurgitation, no stenosis  TV=mild regurgitation  PV= no significant regurgitation,   Aorta= no significant plaque noted  PUL VAL FLOW=Systolic blunting noted.      Impression:  No THUY clot    Plan:  Proceed with ablation

## 2022-02-02 NOTE — PLAN OF CARE
Received patient from PACU. Bilateral groins assessed and no bleeding or hematoma noted.  [address] [facility], [address] [place] [address] 2/2/2022

## 2022-02-02 NOTE — H&P
Electrophysiology H&p Note      Reason for consultation:  Atrial flutter / ? afib    Chief complaint : Shortness of breath, chest pressure    Referring physician: Gwendolyn Pickering      Primary care physician: Osmany Virk MD      History of Present Illness: Today visit (02/02/22)    Patient here for atrial flutter / tachycardia ablation. No change in H&P noted from previous clinic visit. Previous visit:      Patient is a 76-year-old female with history of colitis referred by Dr. Berenice Schneider for atrial flutter. Patient reports she she states she has chest pressure and feels shortness of breath most of the time. She also has dizziness upon standing. She states that her heart races at any time and this causes symptoms mentioned above. And usually the symptoms last for a few minutes. Patient has swelling in the ankles and feet after a long day but goes away in the morning when she sleeps. Patient drinks cup of coffee in the morning. Patient drinks Diet Coke daily        Pastmedical history:   Past Medical History:   Diagnosis Date    Abnormal EKG     Bradycardia     Chronic colitis 1/20/2022    Edema leg     Family history of coronary artery disease     Former smoker     History of cardiac monitoring 10/18/2019    48 hr monitor, Short runs of SVT, few PAC and PVC noted, no significant arrhythmias, no significant pauses.  Hx of cardiovascular stress test 10/17/2019    Fair Exercise Tolerance. No chest pain noted during testing.  Hx of Doppler echocardiogram 10/17/2019    Normal left ventricle structure and systolic function with an ejection fraction of 55-60%. Grade I diastolic dysfunction. Sclerotic, but non-stenotic aortic valve. Mild aortic regurgitation with a pressure half time of 823. Normal pulmonary artery pressure. No evidence of pericardial effusion.     Hyperlipidemia     Paroxysmal atrial flutter ([address] 12/22/2021    Exercise induced 12/22/2021 stress test    Pre-op evaluation 10/2019    PVC's (premature ventricular contractions)     SOB (shortness of breath)     [facility] 4/95/8092    Ulcerative colitis, acute, without complications ([facility] 6/08/7000       Surgical history :   Past Surgical History:   Procedure Laterality Date    COLOSTOMY N/A 5/9/2019    BOWEL RESECTION COLOSTOMY HARTMANS PROCEDURE INCIDENTAL APPENDECTOMY MULTIPLE SMALL BOWEL SEROSAL REPAIR performed by Regla Hernandez MD at [facility] 284         Family history:   Family History   Problem Relation Age of Onset    Dementia Mother     Cancer Father     Lung Cancer Sister     Colon Cancer Sister        Social history :  reports that she has quit smoking. She has never used smokeless tobacco. She reports current alcohol use. She reports that she does not use drugs. No Known Allergies    No current facility-administered medications on file prior to encounter. Current Outpatient Medications on File Prior to Encounter   Medication Sig Dispense Refill    metoprolol tartrate (LOPRESSOR) 25 MG tablet Take 1 tablet by mouth 2 times daily 180 tablet 3    melatonin 3 MG TABS tablet Take 20 mg by mouth daily      lansoprazole (PREVACID) 30 MG delayed release capsule TAKE 1 CAPSULE BY MOUTH ONCE DAILY BEFORE A MEAL      ferrous sulfate (IRON 325) 325 (65 Fe) MG tablet Take 325 mg by mouth 3 times daily (with meals)      Cholecalciferol (VITAMIN D3) 1.25 MG (13457 UT) TABS Take by mouth      gabapentin (NEURONTIN) 300 MG capsule Take 300 mg by mouth.       apixaban (ELIQUIS) 5 MG TABS tablet Take 1 tablet by mouth 2 times daily Samples Lot# [id number] exp. 8/23 42 tablet 0    predniSONE (DELTASONE) 10 MG tablet prednisone 10 mg tablet   Taper schedule      Handicap Placard MISC by Does not apply route 1 each 0    atorvastatin (LIPITOR) 10 MG tablet Take 10 mg by mouth daily         Review of Systems:   Review of Systems   Constitutional: Positive for fatigue. Negative for activity change, chills and fever. HENT: Negative for congestion, ear pain and tinnitus. Eyes: Negative for photophobia, pain and visual disturbance. Respiratory: Positive for shortness of breath. Negative for cough, chest tightness and wheezing. Cardiovascular: Positive for chest pain and palpitations. Negative for leg swelling. Gastrointestinal: Negative for abdominal pain, blood in stool, constipation, diarrhea, nausea and vomiting. Endocrine: Negative for cold intolerance and heat intolerance. Genitourinary: Negative for dysuria, flank pain and hematuria. Musculoskeletal: Positive for arthralgias. Negative for back pain, myalgias and neck stiffness. Skin: Negative for color change and rash. Allergic/Immunologic: Negative for food allergies. Neurological: Negative for dizziness, light-headedness, numbness and headaches. Hematological: Does not bruise/bleed easily. Psychiatric/Behavioral: Negative for agitation, behavioral problems and confusion. Examination:      Vitals:    02/02/22 0612   BP: (!) 146/87   Pulse: 95   Resp: 16   Temp: 96.3 °F (35.7 °C)   TempSrc: Temporal   SpO2: 98%   Weight: 223 lb (101.2 kg)   Height: 5' 9\" (1.753 m)        Body mass index is 32.93 kg/m². Physical Exam  Constitutional:       Appearance: Normal appearance. She is not ill-appearing. HENT:      Head: Normocephalic and atraumatic. Mouth/Throat:      Mouth: Mucous membranes are moist.   Eyes:      Conjunctiva/sclera: Conjunctivae normal.   Cardiovascular:      Rate and Rhythm: Normal rate and regular rhythm. Heart sounds: No murmur heard. Pulmonary:      Effort: Pulmonary effort is normal.      Breath sounds: No rales. Abdominal:      General: Abdomen is flat. Palpations: Abdomen is soft. Musculoskeletal:         General: No tenderness. Normal range of motion. Cervical back: Normal range of motion. Right lower leg: No edema. Left lower leg: No edema. Skin:     General: Skin is warm and dry. Neurological:      General: No focal deficit present. Mental Status: She is alert and oriented to person, place, and time. CBC:   Lab Results   Component Value Date    WBC 8.1 01/27/2022    HGB 12.6 01/27/2022    HCT 40.4 01/27/2022     01/27/2022     Lipids:  Lab Results   Component Value Date    CHOL 131 12/02/2021    TRIG 87 12/02/2021    HDL 45 12/02/2021    LDLCALC 69 12/02/2021     PT/INR:   Lab Results   Component Value Date    INR 1.33 01/27/2022        BMP:    Lab Results   Component Value Date     01/27/2022    K 4.9 01/27/2022     01/27/2022    CO2 28 01/27/2022    BUN 11 01/27/2022     CMP:   Lab Results   Component Value Date    AST 14 12/27/2021    PROT 6.8 12/27/2021    BILITOT 0.2 12/27/2021    ALKPHOS 69 12/27/2021     TSH:  No results found for: TSH    EKGINTERPRETATION - EKG Interpretation:  SINUS RHYTHM , PVC      IMPRESSION / RECOMMENDATIONS:       Atrial flutter/atrial tachycardia/ - ? Fib  Sleep apnea  History of diverticulitis status surgery post colostomy now she has colitis  PVC  Obesity BMI 32    Patient also having PVCs. Patient is having recurrent atrial flutter/tachycardia episodes cannot rule out atrial fibrillation. Patient is on Eliquis. Patient has Colitis and in the long-term anticoagulation can be an issue in the patient. Discussed with the patient about ablation as an option as patient also has atrial flutter and higher success rate. Patient wants to have general anesthesia and does not want to be awake for the procedure. D    iscussed with the patient pathophysiology of the procedure. We will do the EP study - we will plan for atrial flutter ablation but if patient has A. fib in the setting inducible then I would perform PV isolation at the same time.     Discussed with the patient she may need more than one procedure if no arrhythmias induced - as she will be under anesthesia and some times under anesthesia she may not be able to be induced. Patient voiced understanding. 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 Recommend to take metoprolol twice daily        Thanks again for allowing me to participate in care of this patient. Please call me if you have any questions. With best regards. Suzanne Soriano MD, 2/2/2022 7:48 AM     Please note this report has been partially produced using speech recognition software and may contain errors related to that system including errors in grammar, punctuation, and spelling, as well as words and phrases that may be inappropriate. If there are any questions or concerns please feel free to contact the dictating provider for clarification.

## 2022-02-02 NOTE — OP NOTE
[address]     Electrophysiology Procedure Note       Date of Procedure: 2/2/2022  Patient's Name: Kelly Mueller  YOB: 1950   Medical Record Number: [id number]  Performing Physician: Sergei Keating MD      Procedure Performed:   1. Comprehensive electrophysiologic evaluation including insertion and repositioning of multiple electrode catheters with induction of an arrhythmia with right atrial pacing and recording, right ventricular pacing and recording, His bundle recording with intracardiac catheter ablation of arrhythmogenic focus of focal atrial tachycardia  2. Pacing and recording from coronary sinus   3. Programmed stimulation and pacing after intravenous drug infusion  4. Intracardiac electrophysiologic three-dimensional mapping    Indications for procedure:      Kelly Mueller 80 y.o. female with PMH of documented symptomatic SVT - with  ? flutter vs atrial tachycardia vs ? PAF here for EP study with possible ablation     Details of Procedure: The risks, benefits, alternatives of procedure were explained to the patient. All questions were answered and patient understood and signed informed consent The patient was brought to the electrophysiology lab in a fasting nonsedated state. IV sedation was provided with IV Versed and Fentanyl. Both groins were prepped and draped in the usual sterile fashion. After injection of 2% lidocaine in the respective access area of the right and left groin and femoral veins were accessed by modified seldinger technique and sheaths were placed.     Site Sheath Catheter Location   Right femoral 8F Jerilyn's catheter RV   Left femoral 6F CRD-2 His   Left femoral 7F Decapolar Coronary sinus   Right femoral  8F / SRO Smart touch ablation D-F catheter Right atrium             Baseline parameters (ms):     Baseline cycle length: 742    RI: 210  QRS: 82  QT: 376  AH interval: 95  HV interval: 45      Programmed stimulation: Initially with out iv drug infusion and later with Isuprel iv drug infusion      Sinus node function was normal    Atrioventricular akbar function:   Incremental pacing was performed from the proximal CS and right ventricular apex  and the antegrade and retrograde atrioventricular (AV) Wenckebach cycle length was determined. Antegrade AV Wenckebach was 430 ms. Retrograde AV Wenckebach was not present at 600 ms. Single atrial extrastimuli were delivered following drivetrain of 172SA AND 450ms from the high right atrium and the AV akbar effective refractory period (ERP) was determined. Evidence of dual AV akbar pathways was seen. AVN ERP: 600/400; The atrial ERP: 600/230; 450/220ms    Ventricular function   Single ventricular extrastimuli were delivered following drivetrain of 837MH from the right ventricular apex and the ventricular ERP was determined. Ventricular /280ms      Arrhythmia induction on isuprel infusion:    Ablation was performed initially without Isuprel and patient was having very short atrial runs but nothing sustained then we started on Isuprel and patient was easily inducible into tachycardia cycle length of 380 to 390 ms with one-to-one conduction. This tachycardia had long and variable VA interval with no VA linking. Tachycardia was consistent with atrial tachycardia. Intracardiac electrophysiologic three-dimensional mapping    Following diagnosis of atrial tachycardia, a three-dimensional electroanatomical map of the right atrium using [facility] system and ablation catheter was created. Activation mapping located the earilest atrial activity during tachycardia on inferolateral RA - just above the IVC area.   Very fractionated atrial electrograms were noted -there was multiple areas with fractional signals in that area so we decided to ablate the circumferentially and the fractionated signals    Using 35 vora lesions were put on the earliest atrial activity on the fractionated area. This caused acceleration of the atrial tachycardia followed by termination of the tachycardia. After termination of tachycardia, further programmed stimulation with using Isupril didn't induce any arrhythmia. The patient tolerated the procedure well and there were no complications. The sheath were removed and manual compression was placed with good hemostasis and suture to be removed immediately when patient at recovery area. Patient tolerated the procedure well and stable through out the procedure. Conclusion:  Successful ablation of right sided atrial tachycardia. As in my note I mentioned before that did not appear like a typical flutter to me and it looks more like atrial tachycardia on the monitor and also on the stress test so possible the patient actually has only atrial tachycardia. If patient does not have any arrhythmia further then we can come off anticoagulation. Patient will be needing a loop recorder to assess for any flutter or fibrillation in future but this could be decided on patient symptoms. Plan:     The patient will discharged home today if remains stable. She will receive usual post ablation care.

## 2022-02-08 ENCOUNTER — OFFICE VISIT (OUTPATIENT)
Dept: CARDIOLOGY CLINIC | Age: 72
End: 2022-02-08
Payer: MEDICARE

## 2022-02-08 VITALS
HEIGHT: 69 IN | DIASTOLIC BLOOD PRESSURE: 86 MMHG | BODY MASS INDEX: 31.67 KG/M2 | SYSTOLIC BLOOD PRESSURE: 132 MMHG | WEIGHT: 213.8 LBS | HEART RATE: 77 BPM

## 2022-02-08 DIAGNOSIS — I47.1 ATRIAL TACHYCARDIA (HCC): Primary | ICD-10-CM

## 2022-02-08 PROCEDURE — 93000 ELECTROCARDIOGRAM COMPLETE: CPT | Performed by: NURSE PRACTITIONER

## 2022-02-08 PROCEDURE — 99213 OFFICE O/P EST LOW 20 MIN: CPT | Performed by: NURSE PRACTITIONER

## 2022-02-08 PROCEDURE — G8400 PT W/DXA NO RESULTS DOC: HCPCS | Performed by: NURSE PRACTITIONER

## 2022-02-08 PROCEDURE — 4040F PNEUMOC VAC/ADMIN/RCVD: CPT | Performed by: NURSE PRACTITIONER

## 2022-02-08 PROCEDURE — G8417 CALC BMI ABV UP PARAM F/U: HCPCS | Performed by: NURSE PRACTITIONER

## 2022-02-08 PROCEDURE — 1090F PRES/ABSN URINE INCON ASSESS: CPT | Performed by: NURSE PRACTITIONER

## 2022-02-08 PROCEDURE — G8427 DOCREV CUR MEDS BY ELIG CLIN: HCPCS | Performed by: NURSE PRACTITIONER

## 2022-02-08 PROCEDURE — 1123F ACP DISCUSS/DSCN MKR DOCD: CPT | Performed by: NURSE PRACTITIONER

## 2022-02-08 PROCEDURE — 1036F TOBACCO NON-USER: CPT | Performed by: NURSE PRACTITIONER

## 2022-02-08 PROCEDURE — 3017F COLORECTAL CA SCREEN DOC REV: CPT | Performed by: NURSE PRACTITIONER

## 2022-02-08 PROCEDURE — G8484 FLU IMMUNIZE NO ADMIN: HCPCS | Performed by: NURSE PRACTITIONER

## 2022-02-08 ASSESSMENT — ENCOUNTER SYMPTOMS
VOMITING: 0
EYE PAIN: 0
WHEEZING: 0
NAUSEA: 0
BACK PAIN: 0
DIARRHEA: 0
BLOOD IN STOOL: 0
CHEST TIGHTNESS: 0
COUGH: 0
CONSTIPATION: 0
ABDOMINAL PAIN: 0
COLOR CHANGE: 0

## 2022-02-08 NOTE — PROGRESS NOTES
Electrophysiology follow-up note      Reason for consultation:  Atrial flutter / ? afib    Chief complaint: Here for follow-up on right-sided atrial tachycardia    Referring physician: Julianne Kulkarni      Primary care physician: Stephanie Sumner MD      History of Present Illness: This visit 2/8/2022  Patient is here today for 1 week follow-up status post ablation of atrial tachycardia. She denies chest pain, palpitations, shortness of breath, lightheadedness, dizziness, edema or syncope. Patient is a 81-year-old female with history of colitis referred by Dr. Twin Reich for atrial flutter. Patient reports she she states she has chest pressure and feels shortness of breath most of the time. She also has dizziness upon standing. She states that her heart races at any time and this causes symptoms mentioned above. And usually the symptoms last for a few minutes. Patient has swelling in the ankles and feet after a long day but goes away in the morning when she sleeps. Patient drinks cup of coffee in the morning. Patient drinks Diet Coke daily        Pastmedical history:   Past Medical History:   Diagnosis Date    Abnormal EKG     Bradycardia     Chronic colitis 1/20/2022    Edema leg     Family history of coronary artery disease     Former smoker     History of cardiac monitoring 10/18/2019    48 hr monitor, Short runs of SVT, few PAC and PVC noted, no significant arrhythmias, no significant pauses.  Hx of cardiovascular stress test 10/17/2019    Fair Exercise Tolerance. No chest pain noted during testing.  Hx of Doppler echocardiogram 10/17/2019    Normal left ventricle structure and systolic function with an ejection fraction of 55-60%. Grade I diastolic dysfunction. Sclerotic, but non-stenotic aortic valve. Mild aortic regurgitation with a pressure half time of 823. Normal pulmonary artery pressure. No evidence of pericardial effusion.     Hyperlipidemia     Paroxysmal atrial flutter (HCC) 12/22/2021    Exercise induced 12/22/2021 stress test    Pre-op evaluation 10/2019    PVC's (premature ventricular contractions)     SOB (shortness of breath)     Ulcerative colitis with complication ([facility] [address] 3/99/3440    Ulcerative colitis, acute, without complications ([facility] [address] 3/20/3402       Surgical history :   Past Surgical History:   Procedure Laterality Date    ABLATION OF DYSRHYTHMIC FOCUS  02/02/2022    atrial tachycardia ablation    COLOSTOMY N/A 05/09/2019    BOWEL RESECTION COLOSTOMY HARTMANS PROCEDURE INCIDENTAL APPENDECTOMY MULTIPLE SMALL BOWEL SEROSAL REPAIR performed by Ginna Zarate MD at [facility]         Family history:   Family History   Problem Relation Age of Onset    Dementia Mother     Cancer Father     Lung Cancer Sister     Colon Cancer Sister        Social history :  reports that she has quit smoking. She has never used smokeless tobacco. She reports current alcohol use. She reports that she does not use drugs. No Known Allergies    Current Outpatient Medications on File Prior to Visit   Medication Sig Dispense Refill    metoprolol tartrate (LOPRESSOR) 25 MG tablet Take 1 tablet by mouth 2 times daily 180 tablet 3    apixaban (ELIQUIS) 5 MG TABS tablet Take 1 tablet by mouth 2 times daily Samples Lot# [id number] exp. 8/23 42 tablet 0    melatonin 3 MG TABS tablet Take 20 mg by mouth daily      lansoprazole (PREVACID) 30 MG delayed release capsule TAKE 1 CAPSULE BY MOUTH ONCE DAILY BEFORE A MEAL      ferrous sulfate (IRON 325) 325 (65 Fe) MG tablet Take 325 mg by mouth 3 times daily (with meals)      Cholecalciferol (VITAMIN D3) 1.25 MG (49157 UT) TABS Take by mouth      gabapentin (NEURONTIN) 300 MG capsule Take 300 mg by mouth.       Handicap Placard MISC by Does not apply route 1 each 0    atorvastatin (LIPITOR) 10 MG tablet Take 10 mg by mouth daily Pt states she's taking 3x wk       No current facility-administered medications on file prior to visit. Review of Systems:   Review of Systems   Constitutional: Positive for fatigue. Negative for activity change, chills and fever. HENT: Negative for congestion, ear pain, sinus pressure and sinus pain. Eyes: Negative for pain, redness, itching and visual disturbance. Respiratory: Negative for cough, chest tightness, shortness of breath and wheezing. Cardiovascular: Negative for chest pain, palpitations and leg swelling. Gastrointestinal: Negative for abdominal distention, abdominal pain, blood in stool, constipation, diarrhea, nausea and vomiting. Endocrine: Negative for cold intolerance and heat intolerance. Genitourinary: Negative for difficulty urinating, dysuria and hematuria. Musculoskeletal: Positive for arthralgias. Negative for back pain, myalgias and neck stiffness. Skin: Negative for color change, pallor, rash and wound. Allergic/Immunologic: Negative for food allergies. Neurological: Negative for dizziness, syncope, light-headedness, numbness and headaches. Hematological: Does not bruise/bleed easily. Psychiatric/Behavioral: Negative for agitation, behavioral problems and confusion. The patient is not nervous/anxious. Examination:      Vitals:    02/08/22 1029   BP: 132/86   Pulse: 77   Weight: 213 lb 12.8 oz (97 kg)   Height: 5' 9\" (1.753 m)        Body mass index is 31.57 kg/m². Physical Exam  Constitutional:       Appearance: Normal appearance. She is not ill-appearing. HENT:      Head: Normocephalic and atraumatic. Right Ear: External ear normal.      Left Ear: External ear normal.      Nose: No congestion or rhinorrhea. Mouth/Throat:      Mouth: Mucous membranes are moist.   Eyes:      General:         Right eye: No discharge. Left eye: No discharge. Conjunctiva/sclera: Conjunctivae normal.   Cardiovascular:      Rate and Rhythm: Normal rate and regular rhythm.       Heart sounds: No murmur heard. Pulmonary:      Effort: Pulmonary effort is normal.      Breath sounds: Normal breath sounds. No wheezing or rhonchi. Abdominal:      General: Abdomen is flat. Palpations: Abdomen is soft. Musculoskeletal:         General: Normal range of motion. Cervical back: Normal range of motion. Right lower leg: No edema. Left lower leg: No edema. Skin:     General: Skin is warm and dry. Neurological:      General: No focal deficit present. Mental Status: She is alert and oriented to person, place, and time. Psychiatric:         Mood and Affect: Mood normal.         Thought Content:  Thought content normal.           CBC:   Lab Results   Component Value Date    WBC 8.1 01/27/2022    HGB 11.0 02/02/2022    HCT 32.0 02/02/2022     01/27/2022     Lipids:  Lab Results   Component Value Date    CHOL 131 12/02/2021    TRIG 87 12/02/2021    HDL 45 12/02/2021    LDLCALC 69 12/02/2021     PT/INR:   Lab Results   Component Value Date    INR 1.33 01/27/2022        BMP:    Lab Results   Component Value Date     02/02/2022    K 3.9 02/02/2022     01/27/2022    CO2 26 02/02/2022    BUN 11 01/27/2022     CMP:   Lab Results   Component Value Date    AST 14 12/27/2021    PROT 6.8 12/27/2021    BILITOT 0.2 12/27/2021    ALKPHOS 69 12/27/2021     TSH:  No results found for: TSH    EKGINTERPRETATION - EKG Interpretation:  SINUS RHYTHM , PVC      IMPRESSION / RECOMMENDATIONS:     Status post ablation of atrial tachycardia  Sleep apnea  History of diverticulitis status post surgery colostomy now she has colitis  Obesity BMI 32    EKG obtained patient noted to be in sinus rhythm with heart of 77  She denies palpitations or tachycardia  States she still has problems with her colitis daily  We will continue Lopressor 25 mg twice daily and Eliquis 5 mg twice daily    If patient does have symptoms we may need to consider loop recorder in the future  This was discussed with patient who voiced understanding and agreed to plan    Thanks again for allowing me to participate in care of this patient. Please call me if you have any questions. With best regards. FABIEN Hook CNP, 2/8/2022 10:46 AM     Please note this report has been partially produced using speech recognition software and may contain errors related to that system including errors in grammar, punctuation, and spelling, as well as words and phrases that may be inappropriate. If there are any questions or concerns please feel free to contact the dictating provider for clarification.

## 2022-02-11 ENCOUNTER — PROCEDURE VISIT (OUTPATIENT)
Dept: CARDIOLOGY CLINIC | Age: 72
End: 2022-02-11
Payer: MEDICARE

## 2022-02-11 ENCOUNTER — TELEPHONE (OUTPATIENT)
Dept: CARDIOLOGY CLINIC | Age: 72
End: 2022-02-11

## 2022-02-11 DIAGNOSIS — R93.89 ABNORMAL CT SCAN, CHEST: ICD-10-CM

## 2022-02-11 DIAGNOSIS — Z01.810 ENCOUNTER FOR PREPROCEDURAL CARDIOVASCULAR EXAMINATION: ICD-10-CM

## 2022-02-11 LAB
LV EF: 52 %
LVEF MODALITY: NORMAL

## 2022-02-11 PROCEDURE — 78452 HT MUSCLE IMAGE SPECT MULT: CPT | Performed by: INTERNAL MEDICINE

## 2022-02-11 PROCEDURE — 93018 CV STRESS TEST I&R ONLY: CPT | Performed by: INTERNAL MEDICINE

## 2022-02-11 PROCEDURE — 93016 CV STRESS TEST SUPVJ ONLY: CPT | Performed by: INTERNAL MEDICINE

## 2022-02-11 PROCEDURE — A9500 TC99M SESTAMIBI: HCPCS | Performed by: INTERNAL MEDICINE

## 2022-02-11 PROCEDURE — 93017 CV STRESS TEST TRACING ONLY: CPT | Performed by: INTERNAL MEDICINE

## 2022-02-11 ASSESSMENT — ENCOUNTER SYMPTOMS
SINUS PRESSURE: 0
SINUS PAIN: 0
ABDOMINAL DISTENTION: 0
SHORTNESS OF BREATH: 0
EYE REDNESS: 0
EYE ITCHING: 0

## 2022-02-11 NOTE — TELEPHONE ENCOUNTER
----- Message from FABIEN Su CNP sent at 2/11/2022  3:41 PM EST -----  Please ask patient to see Dr. Tiffanie Hernandez next week to discuss  ----- Message -----  From: Handy Hamilton Incoming Cardiovascular Results From [facility]  Sent: 2/11/2022   1:20 PM EST  To: FABIEN uS CNP      Pt is scheduled for 2/18/22

## 2022-02-16 ENCOUNTER — HOSPITAL ENCOUNTER (OUTPATIENT)
Dept: INFUSION THERAPY | Age: 72
Setting detail: INFUSION SERIES
Discharge: HOME | End: 2022-02-16
Payer: MEDICARE

## 2022-02-16 VITALS
DIASTOLIC BLOOD PRESSURE: 71 MMHG | OXYGEN SATURATION: 99 % | RESPIRATION RATE: 16 BRPM | TEMPERATURE: 97.3 F | HEART RATE: 73 BPM | SYSTOLIC BLOOD PRESSURE: 128 MMHG

## 2022-02-16 DIAGNOSIS — K52.9 CHRONIC COLITIS: ICD-10-CM

## 2022-02-16 DIAGNOSIS — K51.90 ULCERATIVE COLITIS, ACUTE, WITHOUT COMPLICATIONS (HCC): Primary | ICD-10-CM

## 2022-02-16 PROCEDURE — 99211 OFF/OP EST MAY X REQ PHY/QHP: CPT

## 2022-02-16 PROCEDURE — 96365 THER/PROPH/DIAG IV INF INIT: CPT

## 2022-02-16 PROCEDURE — 2580000003 HC RX 258: Performed by: SPECIALIST

## 2022-02-16 PROCEDURE — 6360000002 HC RX W HCPCS: Performed by: SPECIALIST

## 2022-02-16 RX ORDER — HEPARIN SODIUM (PORCINE) LOCK FLUSH IV SOLN 100 UNIT/ML 100 UNIT/ML
500 SOLUTION INTRAVENOUS PRN
Status: CANCELLED | OUTPATIENT
Start: 2022-03-02

## 2022-02-16 RX ORDER — SODIUM CHLORIDE 9 MG/ML
INJECTION, SOLUTION INTRAVENOUS CONTINUOUS
Status: CANCELLED | OUTPATIENT
Start: 2022-03-02

## 2022-02-16 RX ORDER — ALBUTEROL SULFATE 90 UG/1
4 AEROSOL, METERED RESPIRATORY (INHALATION) PRN
Status: CANCELLED | OUTPATIENT
Start: 2022-03-02

## 2022-02-16 RX ORDER — ONDANSETRON 2 MG/ML
8 INJECTION INTRAMUSCULAR; INTRAVENOUS
Status: CANCELLED | OUTPATIENT
Start: 2022-03-02

## 2022-02-16 RX ORDER — SODIUM CHLORIDE 0.9 % (FLUSH) 0.9 %
5-40 SYRINGE (ML) INJECTION PRN
Status: DISCONTINUED | OUTPATIENT
Start: 2022-02-16 | End: 2022-02-17 | Stop reason: HOSPADM

## 2022-02-16 RX ORDER — SODIUM CHLORIDE 0.9 % (FLUSH) 0.9 %
30 SYRINGE (ML) INJECTION ONCE
Status: CANCELLED | OUTPATIENT
Start: 2022-03-02

## 2022-02-16 RX ORDER — MAGNESIUM 30 MG
250 TABLET ORAL DAILY
COMMUNITY
End: 2022-03-17 | Stop reason: ALTCHOICE

## 2022-02-16 RX ORDER — EPINEPHRINE 1 MG/ML
0.3 INJECTION, SOLUTION, CONCENTRATE INTRAVENOUS PRN
Status: CANCELLED | OUTPATIENT
Start: 2022-03-02

## 2022-02-16 RX ORDER — SODIUM CHLORIDE 0.9 % (FLUSH) 0.9 %
5-40 SYRINGE (ML) INJECTION PRN
Status: CANCELLED | OUTPATIENT
Start: 2022-03-02

## 2022-02-16 RX ORDER — DIPHENHYDRAMINE HYDROCHLORIDE 50 MG/ML
50 INJECTION INTRAMUSCULAR; INTRAVENOUS
Status: CANCELLED | OUTPATIENT
Start: 2022-03-02

## 2022-02-16 RX ORDER — ACETAMINOPHEN 325 MG/1
650 TABLET ORAL
Status: CANCELLED | OUTPATIENT
Start: 2022-03-02

## 2022-02-16 RX ORDER — SODIUM CHLORIDE 0.9 % (FLUSH) 0.9 %
30 SYRINGE (ML) INJECTION ONCE
Status: COMPLETED | OUTPATIENT
Start: 2022-02-16 | End: 2022-02-16

## 2022-02-16 RX ADMIN — SODIUM CHLORIDE, PRESERVATIVE FREE 10 ML: 5 INJECTION INTRAVENOUS at 11:11

## 2022-02-16 RX ADMIN — SODIUM CHLORIDE, PRESERVATIVE FREE 30 ML: 5 INJECTION INTRAVENOUS at 11:52

## 2022-02-16 RX ADMIN — VEDOLIZUMAB 300 MG: 300 INJECTION, POWDER, LYOPHILIZED, FOR SOLUTION INTRAVENOUS at 11:12

## 2022-02-21 ENCOUNTER — HOSPITAL ENCOUNTER (OUTPATIENT)
Age: 72
Setting detail: SPECIMEN
Discharge: HOME | End: 2022-02-21
Payer: MEDICARE

## 2022-02-21 ENCOUNTER — OFFICE VISIT (OUTPATIENT)
Dept: CARDIOLOGY CLINIC | Age: 72
End: 2022-02-21
Payer: MEDICARE

## 2022-02-21 VITALS
HEART RATE: 77 BPM | BODY MASS INDEX: 32.29 KG/M2 | OXYGEN SATURATION: 95 % | WEIGHT: 218 LBS | SYSTOLIC BLOOD PRESSURE: 128 MMHG | DIASTOLIC BLOOD PRESSURE: 84 MMHG | HEIGHT: 69 IN

## 2022-02-21 DIAGNOSIS — R07.2 PRECORDIAL PAIN: ICD-10-CM

## 2022-02-21 DIAGNOSIS — I83.893 VARICOSE VEINS OF LOWER EXTREMITIES WITH COMPLICATIONS, BILATERAL: ICD-10-CM

## 2022-02-21 DIAGNOSIS — Z01.810 PRE-OPERATIVE CARDIOVASCULAR EXAMINATION: Primary | ICD-10-CM

## 2022-02-21 DIAGNOSIS — G25.81 RESTLESS LEGS SYNDROME (RLS): ICD-10-CM

## 2022-02-21 DIAGNOSIS — R93.89 ABNORMAL CT SCAN, CHEST: ICD-10-CM

## 2022-02-21 DIAGNOSIS — I47.1 ATRIAL TACHYCARDIA (HCC): Primary | ICD-10-CM

## 2022-02-21 DIAGNOSIS — I10 PRIMARY HYPERTENSION: ICD-10-CM

## 2022-02-21 PROCEDURE — 99214 OFFICE O/P EST MOD 30 MIN: CPT | Performed by: NURSE PRACTITIONER

## 2022-02-21 PROCEDURE — 4040F PNEUMOC VAC/ADMIN/RCVD: CPT | Performed by: NURSE PRACTITIONER

## 2022-02-21 PROCEDURE — 1123F ACP DISCUSS/DSCN MKR DOCD: CPT | Performed by: NURSE PRACTITIONER

## 2022-02-21 PROCEDURE — U0005 INFEC AGEN DETEC AMPLI PROBE: HCPCS

## 2022-02-21 PROCEDURE — 3017F COLORECTAL CA SCREEN DOC REV: CPT | Performed by: NURSE PRACTITIONER

## 2022-02-21 PROCEDURE — G8484 FLU IMMUNIZE NO ADMIN: HCPCS | Performed by: NURSE PRACTITIONER

## 2022-02-21 PROCEDURE — G8417 CALC BMI ABV UP PARAM F/U: HCPCS | Performed by: NURSE PRACTITIONER

## 2022-02-21 PROCEDURE — G8427 DOCREV CUR MEDS BY ELIG CLIN: HCPCS | Performed by: NURSE PRACTITIONER

## 2022-02-21 PROCEDURE — 1090F PRES/ABSN URINE INCON ASSESS: CPT | Performed by: NURSE PRACTITIONER

## 2022-02-21 PROCEDURE — G8400 PT W/DXA NO RESULTS DOC: HCPCS | Performed by: NURSE PRACTITIONER

## 2022-02-21 PROCEDURE — U0003 INFECTIOUS AGENT DETECTION BY NUCLEIC ACID (DNA OR RNA); SEVERE ACUTE RESPIRATORY SYNDROME CORONAVIRUS 2 (SARS-COV-2) (CORONAVIRUS DISEASE [COVID-19]), AMPLIFIED PROBE TECHNIQUE, MAKING USE OF HIGH THROUGHPUT TECHNOLOGIES AS DESCRIBED BY CMS-2020-01-R: HCPCS

## 2022-02-21 PROCEDURE — 1036F TOBACCO NON-USER: CPT | Performed by: NURSE PRACTITIONER

## 2022-02-21 ASSESSMENT — ENCOUNTER SYMPTOMS
SHORTNESS OF BREATH: 1
COUGH: 0

## 2022-02-21 NOTE — PROGRESS NOTES
Patient here in office & educated on [facility] for Dx: Abnormal NM, scheduled for 2/25/22 @ 10:00, w/arrival @ 8:00, @ [facility]. Risks explained; & consents signed. Instructions given to pt to: jimbo JENKINS after midnight the night before procedure. Patient to call hospital @ [phone] to pre-register. May take morning meds the morning of procedure. Patient was notified that procedure could be delayed due to an emergency. Patient voiced understanding. Copies of consent, pre-testing orders, & info. sheet scanned into media. Patient informed of instructions and guidance for performing test.  Throat swab performed. Swab placed into labeled collection tube. Collection tube and lab order placed in plastic bag. Bag placed in biohazard bag and placed in fridge.  called for . Patient instructed to self quarantine until procedure.

## 2022-02-21 NOTE — LETTER
Elvia Odonnell WITH POSSIBLE PERCUTANEOUS CORONARY INTERVENTION     Patient Name: Noemí Tucker   : 1950   MRN# [id number]    Date of Procedure: 22 Time: 10:00 Arrival Time: 8L00    The catheterization and angiogram are usually outpatient procedures, however if stenting is needed you may need to stay overnight. You will need to arrive at the hospital two hours before the procedure. You will need to arrange for someone to drive you home. You will go to registration in the main lobby. HOSPITAL:  [facility])  Call to [facility] at: [phone] 1-2 days before your procedure. Please have blood work and chest-x-ray done 1 to 2 days before procedure at    [facility]. X Please do not have anything by mouth after midnight prior to or 8 hours before the procedure. X You may take your medications with a sip of water in the morning before your procedure or take them with you. X If you take  Eliquis, it should be held 48 Hours. X After your COVID-19 Test, lab work, and Chest Xray are completed you will need to Ladena Delay yourself until procedure. Patient Signature:  _________________________           Staff Giving Instructions_______________________________                                               Elvia Carpenter    Pre Cath Lab Orders     Patient Name: Noemí Tucker   : 1950   MRN# [id number]    Pre Cath Lab orders     1. IV of 0.9 NS@ 75ml/hr started 2 hours prior to procedure. (#20 Gauge Insyte or larger)  2. Diazepam (Valium) 5 mg po ONCE in Cath Lab. 3. Diphenhydramine (Benadryl) 25 mg ONCE.          PHYSICIAN SIGNATURE:      DATE:   TIME:______________                                                                                                         *This consent is applicable for 30 days following patient signature*    PROCEDURAL INFORMED CONSENT FOR OPERATION / PROCEDURE    I (We), Veleria Epley authorize, Dr. Damir Bee    and such assistants as may be selected by him/her, to perform the following operation/procedures:     LEFT HEART CATHETERIZATION WITH POSSIBLE PERCUTANEOUS CORONARY INTERVENTION     Note: If unable to obtain consent prior to an emergent procedure, document the emergent reason in the medical record. This procedure has been explained to my (our) satisfaction and included in the explanation was:   A) the intended benefit, nature, and extent of the procedure to be performed;   B) the significant risks involved and the probability of success;   C) alternative procedures and methods of treatment;   D) the dangers and probable consequences of such alternatives (including no procedure or treatment); E) the expected consequences of the procedure on my future health;   F) whether other qualified individuals would be performing important surgical tasks and / or whether  would be present to advise or support the procedure. I (we) understand that there are other risks of infection and other serious complications in the pre-operative/procedural and postoperative/procedural stages of my (our) care. I (we) have asked all of the questions which I (we) thought were important in deciding whether or not to undergo treatment or diagnosis. These questions have been answered to my (our) satisfaction. I (we) understand that no assurance can be given that the procedure will be a success, and no guarantee or warranty of success has been given to me (us). 2. It has been explained to me (us) that during the course of the operation/procedure, unforeseen conditions may be revealed that necessitate extension of the original procedure(s) or different procedure(s) than those set forth in Paragraph 1.  I (we) authorize and request that the above-named physician, his/her assistants or his/her designees, perform procedures as necessary and desirable if deemed to be in my (our) best interest.     3. I acknowledge that other health care personnel may be observing this procedure for the purpose of medical education or other specified purposes as may be necessary as requested and/or approved by my (our) physician. 4. I (we) consent to the disposal by the hospital Pathologist of the removed tissue, parts or organs in accordance with hospital policy. 5. I do_____ do not______ consent to the use of a local infiltration pain blocking agent that will be used by my provider/surgical provider to help alleviate pain during my procedure. 6. I do_____ do not_____ consent to an emergent blood transfusion in the case of a life-threatening situation that requires blood components to be administered. This consent is valid for 24 hours from the beginning of the procedure. 7. This patient does _____ or does not ______currently have a DNR status/order. If DNR order is in place, obtain \"Addendum to the Surgical Consent for ALL Patients with a DNR Order\" to address isabel-operative status for limited intervention or DNR suspension.      8. I have read and fully understand the above Consent for Operation/Procedure and that all blanks were completed before I signed the consent.       ____________________________________ _________________________    ________/_________am/pm   Signature of Patient or legal representative Printed Name / Relationship Date / Time       ____________________________________ ____________________   _________/__________ am/pm   Witness to Signature Printed Name Date / Time    (If patient is unable to sign or is a minor, complete the following)     Patient is a minor, ____years of age, or unable to sign because: _____________________________________   _________________________________________________________________________________________   Kenia Montague If a phone consent is obtained, consent will be documented by using two health care professionals, each affirming that the consenting party has no questions and gives consent for the procedure discussed with the physician/provider.  _________________________________ _______________________________ ______/_____am/pm   2nd witness to phone consent Printed name Date / Time     Informed consent:   I have provided the explanation described above in section 1 to the patient and/or legal representative.  I have provided the patient and/or legal representative with an opportunity to ask any questions about the proposed operation/procedure.       ______________________________ _________________________   _________/__________am/pm   Provider Signature Date / Time     Revised 2021                                                               Naeem Contreras      PROCEDURE TO SCHEDULE:    LEFT HEART CATHETERIZATION WITH POSSIBLE PERCUTANEOUS CORONARY INTERVENTION     Patient Name: Ismael Harris   : 1950   MRN# [id number]    Home Phone Number: [phone]   Weight:    Wt Readings from Last 3 Encounters:   22 218 lb (98.9 kg)   22 213 lb 12.8 oz (97 kg)   22 223 lb (101.2 kg)        Insurance: Payor: Melinda Muse / Plan: MEDICARE PART A AND B / Product Type: *No Product type* /     Date of Procedure: 22  Time: 10:00 Arrival Time: 8:00    Diagnosis:  Abnormal NM  Allergies: No Known Allergies         Leatrice Nyhan, RN

## 2022-02-21 NOTE — PROGRESS NOTES
[facility] ([facility] [address] [facility],Third [address]  Phone: [phone]    Fax [phone]    Candida Castro MD, Carla Jones MD, [address] [facility], MD, MD Francisco Vargas MD Kathyrn Eke, MD Hilarie Brackett, MD Ernie Scala, FABIEN Matthews, FABIEN Villavicencio, FABIEN Molina, APRN  Inge Moreno, [place]     CARDIOLOGY  NOTE    2022    Kelly Mueller (:  1950) is a 80 y.o. female,an established patient with Dr. Quincy Ruth, here for evaluation of the following chief complaint(s):  Results (pt is here for abnormal test results. pt states her sx have not gotten better or worse. pt does not smoke or drink alcohol. )        SUBJECTIVE/OBJECTIVE:    Dinh Espinal is a 80y.o. year old who has Past medical history as noted below. She is here to review her post cardiac CTA stress test. Her cardiac CTA for her atrial tachycardia ablation was seen to have abnormal LAD possibly. Stress test was done post Ablation and she is here to follow up on the results. She has colitis and having a lot of diarrhea, She is struggling with RLS and Colitis making her life very difficult. She has had colitis for almost 1 year now ,Sees Dr Steve Cabrera . She is on prednisone , she has gained weight , she has been having palpiations heart thumping mostly at night and sometimes at rest,  \"feels like electrical shock\", she gest winded while walking in the [facility] without  she lost 2 sisters in a month . She gets dizzy and feels unbalanced when walking . Dr Tricia Fernandez did colostomy reveresed and also did leg venous ablation . She has put on 25 lbs since her last visit   She is having a lot of tremors and jerking of legs , she saw  neurology . She is exhausted by just doing laundry and walking    She does report shortness of breath she does not walk much due to shortness of breath she is a strong family history of early coronary artery disease.    She had colostomy reversal which has helped She is on Requip    As such she denies any chest pain but she does not do much.         Review of Systems   Constitutional: Negative for fatigue and fever. Respiratory: Positive for shortness of breath. Negative for cough. Cardiovascular: Negative for chest pain, palpitations and leg swelling. Musculoskeletal: Negative for arthralgias and gait problem. Neurological: Negative for dizziness, syncope, weakness, light-headedness and headaches. Vitals:    02/21/22 1444   BP: 128/84   Pulse: 77   SpO2: 95%   Weight: 218 lb (98.9 kg)   Height: 5' 9\" (1.753 m)       Wt Readings from Last 3 Encounters:   02/21/22 218 lb (98.9 kg)   02/08/22 213 lb 12.8 oz (97 kg)   02/02/22 223 lb (101.2 kg)       BP Readings from Last 3 Encounters:   02/21/22 128/84   02/16/22 128/71   02/08/22 132/86       Prior to Admission medications    Medication Sig Start Date End Date Taking? Authorizing Provider   magnesium 30 MG tablet Take 250 mg by mouth daily   Yes Historical Provider, MD   metoprolol tartrate (LOPRESSOR) 25 MG tablet Take 1 tablet by mouth 2 times daily 1/25/22  Yes Janel Vizcaino, APRN - CNP   apixaban (ELIQUIS) 5 MG TABS tablet Take 1 tablet by mouth 2 times daily Samples Lot# [id number] exp. 8/23 1/25/22  Yes Geovanna Hansen MD   melatonin 3 MG TABS tablet Take 20 mg by mouth daily   Yes Historical Provider, MD   lansoprazole (PREVACID) 30 MG delayed release capsule TAKE 1 CAPSULE BY MOUTH ONCE DAILY BEFORE A MEAL 2/15/21  Yes Historical Provider, MD   ferrous sulfate (IRON 325) 325 (65 Fe) MG tablet Take 325 mg by mouth 3 times daily (with meals)   Yes Historical Provider, MD   Cholecalciferol (VITAMIN D3) 1.25 MG (42711 UT) TABS Take by mouth   Yes Historical Provider, MD   gabapentin (NEURONTIN) 300 MG capsule Take 300 mg by mouth.  7/17/20  Yes Historical Provider, MD   Handicap Placard MISC by Does not apply route 6/5/19  Yes Elisabet Burgos MD   atorvastatin (LIPITOR) 10 MG tablet Take 10 mg by mouth daily Pt states she's taking 3x wk   Yes Historical Provider, MD       Physical Exam  Vitals reviewed. Constitutional:       General: She is not in acute distress. Appearance: Normal appearance. She is obese. She is not ill-appearing. HENT:      Head: Atraumatic. Neck:      Vascular: No carotid bruit. Cardiovascular:      Rate and Rhythm: Normal rate and regular rhythm. Pulses: Normal pulses. Heart sounds: Normal heart sounds. No murmur heard. Pulmonary:      Effort: Pulmonary effort is normal. No respiratory distress. Breath sounds: Normal breath sounds. Musculoskeletal:         General: No swelling or deformity. Cervical back: Neck supple. No muscular tenderness. Neurological:      Mental Status: She is alert.          Health Maintenance   Topic Date Due    Hepatitis C screen  Never done    COVID-19 Vaccine (1) Never done    Depression Screen  Never done    Colorectal Cancer Screen  Never done    Breast cancer screen  Never done    Shingles Vaccine (1 of 2) Never done    DEXA (modify frequency per FRAX score)  Never done    Annual Wellness Visit (AWV)  Never done    Lipid screen  12/02/2022    DTaP/Tdap/Td vaccine (2 - Td or Tdap) 03/02/2027    Flu vaccine  Completed    Pneumococcal 65+ years Vaccine  Completed    Hepatitis A vaccine  Aged Out    Hepatitis B vaccine  Aged Out    Hib vaccine  Aged Out    Meningococcal (ACWY) vaccine  Aged Out       Lab Review   Lab Results   Component Value Date    CHOL 131 12/02/2021    TRIG 87 12/02/2021    HDL 45 12/02/2021     Stress test  10/17/19   Stress Type: Exercise      Peak HR: 131 bpm                       HR Response: Appropriate   Peak BP: 158/80 mmHg                   BP Response: Normal resting BP -   Predicted HR: 152 bpm                  appropriate response   % of predicted HR: 86                  HR BP Product: 36311   Exercise Duration: 04:00 min           Max Exercise: 5.8 METS   Reason for Termination: Target heart   rate                                   Exercise Effort: Fair     Echo 10/17/19   Summary   Normal left ventricle structure and systolic function with an ejection   fraction of 55-60%.  Grade I diastolic dysfunction.   Sclerotic, but non-stenotic aortic valve.   Mild aortic regurgitation with a pressure half time of 823.   Normal pulmonary artery pressure.   No evidence of pericardial effusion.     Cardiac CTA 1/31/2022  IMPRESSION:  Normal pulmonary vein angiogram. NO THUY thrombus. Normal ejection fraction of 68%  Ostial LAD and mid LAD has 60-70% eccentric lesion no other significant obstructive disease    NM Stress test 2/11/2022  Summary   Supervising physician Dr. Cohen  .  [place] portion of stress test is negative for ischemia by diagnostic criteria. [address] noted completed 7 METS and 5 Mins of exercise   Normal EF 52 % with normal ventricular contractility.   Anterior and septal wall shows medium size moderate ischemia   Ischemia in LAD territory        ASSESSMENT/PLAN:    Abnormal Cardiac CTA/ Abnormal NM stress  Cardiac CTA showed LAD OM stenosis and NM stress confirmed ischemia to the area. Will plan for LHC. She states that she does have some pinching occasionally. Palpitations/ Atrial tachycardia  SP Right sided atrial tachycardia   Continue to follow up with EPS.    On eliquis without bleeding issues.      Restless leg and tremors  Related to RLS      Hypertension  Controlled  advised low salt diet   On metoprolol. No reported adverse events or reported side effects from medication(s). She is stable on current dose:  will continue     SOB (shortness of breath)  She has lower extremity swelling and shortness of breath but echo is normal , leg venous ablation was done by Dr Pedrito Mauro, encouraged to walk as much as she can. Reviewed chair exercises, too       Dyslipidemia :  All available lab work was reviewed.   Patient was advised to repeat lab work before next visit. Necessary orders were placed , instructions given by myself   Counseled extensively and medication compliance urged. We discussed that for the  prevention of ASCVD our  goal is aggressive risk modification. Patient is encouraged to exercise even a brisk walk for 30 minutes  at least 3 to 4 times a week   Various goals were discussed and questions answered. Continue current medications. Appropriate prescriptions are addressed and refills ordered. Questions answered and patient verbalizes understanding. Call for any problems, questions, or concerns. Follow up after [facility]      An electronic signature was used to authenticate this note.     Electronically signed by FABIEN Stephens CNP on 2/21/2022 at 2:59 PM

## 2022-02-22 LAB
SARS-COV-2: NOT DETECTED
SOURCE: NORMAL

## 2022-02-24 ENCOUNTER — TELEPHONE (OUTPATIENT)
Dept: CARDIOLOGY CLINIC | Age: 72
End: 2022-02-24

## 2022-02-25 ENCOUNTER — HOSPITAL ENCOUNTER (OUTPATIENT)
Dept: CARDIAC CATH/INVASIVE PROCEDURES | Age: 72
Discharge: HOME | End: 2022-02-25
Attending: INTERNAL MEDICINE | Admitting: INTERNAL MEDICINE
Payer: MEDICARE

## 2022-02-25 VITALS
RESPIRATION RATE: 13 BRPM | DIASTOLIC BLOOD PRESSURE: 73 MMHG | HEART RATE: 66 BPM | HEIGHT: 69 IN | WEIGHT: 218 LBS | TEMPERATURE: 96.2 F | SYSTOLIC BLOOD PRESSURE: 113 MMHG | OXYGEN SATURATION: 100 % | BODY MASS INDEX: 32.29 KG/M2

## 2022-02-25 LAB
ANION GAP SERPL CALCULATED.3IONS-SCNC: 8 MMOL/L (ref 4–16)
APTT: 23.5 SECONDS (ref 25.1–37.1)
BUN BLDV-MCNC: 22 MG/DL (ref 6–23)
CALCIUM SERPL-MCNC: 8.8 MG/DL (ref 8.3–10.6)
CHLORIDE BLD-SCNC: 102 MMOL/L (ref 99–110)
CO2: 28 MMOL/L (ref 21–32)
CREAT SERPL-MCNC: 0.8 MG/DL (ref 0.6–1.1)
GFR AFRICAN AMERICAN: >60 ML/MIN/1.73M2
GFR NON-AFRICAN AMERICAN: >60 ML/MIN/1.73M2
GLUCOSE BLD-MCNC: 87 MG/DL (ref 70–99)
HCT VFR BLD CALC: 39.5 % (ref 37–47)
HEMOGLOBIN: 12.1 GM/DL (ref 12.5–16)
INR BLD: 0.92 INDEX
MCH RBC QN AUTO: 28.1 PG (ref 27–31)
MCHC RBC AUTO-ENTMCNC: 30.6 % (ref 32–36)
MCV RBC AUTO: 91.6 FL (ref 78–100)
PDW BLD-RTO: 14.8 % (ref 11.7–14.9)
PLATELET # BLD: 391 K/CU MM (ref 140–440)
PMV BLD AUTO: 9.6 FL (ref 7.5–11.1)
POTASSIUM SERPL-SCNC: 3.5 MMOL/L (ref 3.5–5.1)
PROTHROMBIN TIME: 11.9 SECONDS (ref 11.7–14.5)
RBC # BLD: 4.31 M/CU MM (ref 4.2–5.4)
SODIUM BLD-SCNC: 138 MMOL/L (ref 135–145)
WBC # BLD: 13.4 K/CU MM (ref 4–10.5)

## 2022-02-25 PROCEDURE — 6360000002 HC RX W HCPCS

## 2022-02-25 PROCEDURE — C1769 GUIDE WIRE: HCPCS

## 2022-02-25 PROCEDURE — C1887 CATHETER, GUIDING: HCPCS

## 2022-02-25 PROCEDURE — C1894 INTRO/SHEATH, NON-LASER: HCPCS

## 2022-02-25 PROCEDURE — 85730 THROMBOPLASTIN TIME PARTIAL: CPT

## 2022-02-25 PROCEDURE — 2709999900 HC NON-CHARGEABLE SUPPLY

## 2022-02-25 PROCEDURE — 6360000004 HC RX CONTRAST MEDICATION

## 2022-02-25 PROCEDURE — 93458 L HRT ARTERY/VENTRICLE ANGIO: CPT

## 2022-02-25 PROCEDURE — 6370000000 HC RX 637 (ALT 250 FOR IP): Performed by: INTERNAL MEDICINE

## 2022-02-25 PROCEDURE — 93458 L HRT ARTERY/VENTRICLE ANGIO: CPT | Performed by: INTERNAL MEDICINE

## 2022-02-25 PROCEDURE — 2500000003 HC RX 250 WO HCPCS

## 2022-02-25 PROCEDURE — 85610 PROTHROMBIN TIME: CPT

## 2022-02-25 PROCEDURE — 85027 COMPLETE CBC AUTOMATED: CPT

## 2022-02-25 PROCEDURE — 80048 BASIC METABOLIC PNL TOTAL CA: CPT

## 2022-02-25 RX ORDER — HYDRALAZINE HYDROCHLORIDE 20 MG/ML
10 INJECTION INTRAMUSCULAR; INTRAVENOUS EVERY 10 MIN PRN
Status: DISCONTINUED | OUTPATIENT
Start: 2022-02-25 | End: 2022-02-25 | Stop reason: HOSPADM

## 2022-02-25 RX ORDER — ACETAMINOPHEN 325 MG/1
650 TABLET ORAL EVERY 4 HOURS PRN
Status: DISCONTINUED | OUTPATIENT
Start: 2022-02-25 | End: 2022-02-25 | Stop reason: HOSPADM

## 2022-02-25 RX ORDER — SODIUM CHLORIDE 0.9 % (FLUSH) 0.9 %
5-40 SYRINGE (ML) INJECTION PRN
Status: DISCONTINUED | OUTPATIENT
Start: 2022-02-25 | End: 2022-02-25 | Stop reason: HOSPADM

## 2022-02-25 RX ORDER — SODIUM CHLORIDE 9 MG/ML
INJECTION, SOLUTION INTRAVENOUS CONTINUOUS
Status: DISCONTINUED | OUTPATIENT
Start: 2022-02-25 | End: 2022-02-25 | Stop reason: HOSPADM

## 2022-02-25 RX ORDER — SODIUM CHLORIDE 0.9 % (FLUSH) 0.9 %
5-40 SYRINGE (ML) INJECTION EVERY 12 HOURS SCHEDULED
Status: DISCONTINUED | OUTPATIENT
Start: 2022-02-25 | End: 2022-02-25 | Stop reason: HOSPADM

## 2022-02-25 RX ORDER — DIPHENHYDRAMINE HCL 25 MG
25 TABLET ORAL ONCE
Status: COMPLETED | OUTPATIENT
Start: 2022-02-25 | End: 2022-02-25

## 2022-02-25 RX ORDER — SODIUM CHLORIDE 9 MG/ML
25 INJECTION, SOLUTION INTRAVENOUS PRN
Status: DISCONTINUED | OUTPATIENT
Start: 2022-02-25 | End: 2022-02-25 | Stop reason: HOSPADM

## 2022-02-25 RX ORDER — DIAZEPAM 5 MG/1
5 TABLET ORAL ONCE
Status: COMPLETED | OUTPATIENT
Start: 2022-02-25 | End: 2022-02-25

## 2022-02-25 RX ORDER — 0.9 % SODIUM CHLORIDE 0.9 %
500 INTRAVENOUS SOLUTION INTRAVENOUS ONCE
Status: DISCONTINUED | OUTPATIENT
Start: 2022-02-25 | End: 2022-02-25 | Stop reason: HOSPADM

## 2022-02-25 RX ADMIN — DIPHENHYDRAMINE HYDROCHLORIDE 25 MG: 25 TABLET ORAL at 08:06

## 2022-02-25 RX ADMIN — DIAZEPAM 5 MG: 5 TABLET ORAL at 08:06

## 2022-02-25 NOTE — H&P
Arpita Mccarthy, 80 y.o., female    Primary care physician:  Fatuma Rabago MD     Chief Complaint: Chest Pain    History of Present Illness:  She had  cardiac CTA stress test. Her cardiac CTA for her atrial tachycardia ablation was seen to have abnormal LAD possibly. Stress test was done post Ablation and she is here to follow up on the results. Stress test shows  Anterior and septal wall shows medium size moderate ischemia    Ischemia in LAD territory            She does report shortness of breath she does not walk much due to shortness of breath she is a strong family history of early coronary artery disease        Past medical history:    has a past medical history of Abnormal EKG, Bradycardia, Chronic colitis, Edema leg, Family history of coronary artery disease, Former smoker, History of cardiac monitoring, Hx of cardiovascular stress test, Hx of Doppler echocardiogram, Hyperlipidemia, Paroxysmal atrial flutter ([address] Pre-op evaluation, PVC's (premature ventricular contractions), SOB (shortness of breath), Ulcerative colitis with complication ([address] and Ulcerative colitis, acute, without complications ([address] Past surgical history:   has a past surgical history that includes colostomy (N/A, 05/09/2019); laparotomy; Hemorrhoidectomy; and ablation of dysrhythmic focus (02/02/2022). Social History:   reports that she has quit smoking. She has never used smokeless tobacco. She reports current alcohol use. She reports that she does not use drugs. Family history:  family history includes Cancer in her father; Pan Allegra in her sister; Dementia in her mother; Delwyn Nan in her sister. Allergies:    No Known Allergies    Home Medications:  Prior to Admission medications    Medication Sig Start Date End Date Taking?  Authorizing Provider   magnesium 30 MG tablet Take 250 mg by mouth daily    Historical Provider, MD   metoprolol tartrate (LOPRESSOR) 25 MG tablet Take 1 tablet by mouth 2 times daily 1/25/22   Fer Reagan APRN - CNP   apixaban (ELIQUIS) 5 MG TABS tablet Take 1 tablet by mouth 2 times daily Samples Lot# [id number] exp. 8/23 1/25/22   Pennie Ybarra MD   melatonin 3 MG TABS tablet Take 20 mg by mouth daily    Historical Provider, MD   lansoprazole (PREVACID) 30 MG delayed release capsule TAKE 1 CAPSULE BY MOUTH ONCE DAILY BEFORE A MEAL 2/15/21   Historical Provider, MD   ferrous sulfate (IRON 325) 325 (65 Fe) MG tablet Take 325 mg by mouth 3 times daily (with meals)    Historical Provider, MD   Cholecalciferol (VITAMIN D3) 1.25 MG (68704 UT) TABS Take by mouth    Historical Provider, MD   gabapentin (NEURONTIN) 300 MG capsule Take 300 mg by mouth. 7/17/20   Historical Provider, MD   Handicap Placard MISC by Does not apply route 6/5/19   Mallika Rizo MD   atorvastatin (LIPITOR) 10 MG tablet Take 10 mg by mouth daily Pt states she's taking 3x wk    Historical Provider, MD       Review of systems: Review of Systems:   · Constitutional: No Fever or Weight Loss   · Eyes: No Decreased Vision  · ENT: No Headaches, Hearing Loss or Vertigo  · Cardiovascular: No chest pain, dyspnea on exertion, palpitations or loss of consciousness  · Respiratory: No cough or wheezing    · Gastrointestinal: No abdominal pain, appetite loss, blood in stools, constipation, diarrhea or heartburn  · Genitourinary: No dysuria, trouble voiding, or hematuria  · Musculoskeletal:  No gait disturbance, weakness or joint complaints  · Integumentary: No rash or pruritis  · Neurological: No TIA or stroke symptoms  · Psychiatric: No anxiety or depression  · Endocrine: No malaise, fatigue or temperature intolerance  · Hematologic/Lymphatic: No bleeding problems, blood clots or swollen lymph nodes  Allergic/Immunologic: No nasal congestion or hives    Physical Examination:    There were no vitals taken for this visit.      General Appearance:  No distress, conversant  Constitutional:  Well developed, Well nourished, No acute distress, Non-toxic appearance. HENT:  Normocephalic, Atraumatic, Bilateral external ears normal, Oropharynx moist, No oral exudates, Nose normal. Neck- Normal range of motion, No tenderness, Supple, No stridor,no apical-carotid delay  Eyes:  PERRL, EOMI, Conjunctiva normal, No discharge. Lymphatics: no palpable lymph nodes  Respiratory:  Normal breath sounds, No respiratory distress, No wheezing, No chest tenderness. ,no uise of accessory muscles, JVP not elevated  Cardiovascular: (PMI) apex non displaced,no lifts no thrills, no s3,no s4, Normal heart rate, Normal rhythm, No murmurs, No rubs, No gallops. GI:  Bowel sounds normal, Soft, No tenderness, No masses, No pulsatile masses, no hepatosplenomegally, no bruits  Musculoskeletal:  Intact distal pulses, No edema, No tenderness, No cyanosis, No clubbing. Good range of motion in all major joints. No tenderness to palpation or major deformities noted. Back- No tenderness. Integument:  Warm, Dry, No erythema, No rash. Skin: no rash, no ulcers  Lymphatic:  No lymphadenopathy noted. Neurologic:  Alert & oriented x 3, Normal motor function, Normal sensory function, No focal deficits noted. Lab Review   No results for input(s): WBC, HGB, HCT, PLT in the last 72 hours. No results for input(s): NA, K, CL, CO2, PHOS, BUN, CREATININE, CA in the last 72 hours. No results for input(s): AST, ALT, ALB, BILIDIR, BILITOT, ALKPHOS in the last 72 hours. No results for input(s): TROPONINI in the last 72 hours. Lab Results   Component Value Date    INR 1.33 01/27/2022    PROTIME 17.2 (H) 01/27/2022     No results found for: BNP      Assessment:    Active Problems:    * No active hospital problems. *     ASA : 2 , Mallampati class II  Plan:   1. Chest Pain/ Possible Angina: Alternate and risks versus benefits were discussed in detail. We will plan cardiac catheterization.   We  discussed the risk of but not limited to  potential kidney failure, emergent surgery,blood transfusion  transfusion, infection, potential even death.   Patient is in agreement and wants to proceed

## 2022-02-25 NOTE — PROGRESS NOTES
Dc instructions reviewed with pt and family. Pt verbalizes understanding. r radial drsg dry and intact no hematoma.

## 2022-02-25 NOTE — PROGRESS NOTES
Received from cath lab. Monitor and alarms on. Call light within reach. Dr Krisotpher Villela ordered that it was ok to not obtain blood pressures during post cath VS and site checks because she was stable during the procedure and the patient was refusing to wear the blood pressure cuff because it was \"the most painful thing that she's ever experienced\".

## 2022-03-02 ENCOUNTER — HOSPITAL ENCOUNTER (OUTPATIENT)
Dept: INFUSION THERAPY | Age: 72
Setting detail: INFUSION SERIES
Discharge: HOME | End: 2022-03-02
Payer: MEDICARE

## 2022-03-02 VITALS
SYSTOLIC BLOOD PRESSURE: 119 MMHG | DIASTOLIC BLOOD PRESSURE: 69 MMHG | TEMPERATURE: 97.3 F | HEART RATE: 74 BPM | OXYGEN SATURATION: 100 % | RESPIRATION RATE: 16 BRPM

## 2022-03-02 DIAGNOSIS — K52.9 CHRONIC COLITIS: ICD-10-CM

## 2022-03-02 DIAGNOSIS — K51.90 ULCERATIVE COLITIS, ACUTE, WITHOUT COMPLICATIONS (HCC): Primary | ICD-10-CM

## 2022-03-02 PROCEDURE — 96365 THER/PROPH/DIAG IV INF INIT: CPT

## 2022-03-02 PROCEDURE — 2580000003 HC RX 258: Performed by: SPECIALIST

## 2022-03-02 PROCEDURE — 6360000002 HC RX W HCPCS: Performed by: SPECIALIST

## 2022-03-02 PROCEDURE — 99211 OFF/OP EST MAY X REQ PHY/QHP: CPT

## 2022-03-02 RX ORDER — ACETAMINOPHEN 325 MG/1
650 TABLET ORAL
Status: CANCELLED | OUTPATIENT
Start: 2022-03-30

## 2022-03-02 RX ORDER — ALBUTEROL SULFATE 90 UG/1
4 AEROSOL, METERED RESPIRATORY (INHALATION) PRN
Status: CANCELLED | OUTPATIENT
Start: 2022-03-30

## 2022-03-02 RX ORDER — SODIUM CHLORIDE 0.9 % (FLUSH) 0.9 %
5-40 SYRINGE (ML) INJECTION PRN
Status: DISCONTINUED | OUTPATIENT
Start: 2022-03-02 | End: 2022-03-03 | Stop reason: HOSPADM

## 2022-03-02 RX ORDER — SODIUM CHLORIDE 0.9 % (FLUSH) 0.9 %
30 SYRINGE (ML) INJECTION ONCE
Status: CANCELLED | OUTPATIENT
Start: 2022-03-30

## 2022-03-02 RX ORDER — HEPARIN SODIUM (PORCINE) LOCK FLUSH IV SOLN 100 UNIT/ML 100 UNIT/ML
500 SOLUTION INTRAVENOUS PRN
Status: CANCELLED | OUTPATIENT
Start: 2022-03-30

## 2022-03-02 RX ORDER — SODIUM CHLORIDE 9 MG/ML
INJECTION, SOLUTION INTRAVENOUS CONTINUOUS
Status: CANCELLED | OUTPATIENT
Start: 2022-03-30

## 2022-03-02 RX ORDER — SODIUM CHLORIDE 0.9 % (FLUSH) 0.9 %
5-40 SYRINGE (ML) INJECTION PRN
Status: CANCELLED | OUTPATIENT
Start: 2022-03-30

## 2022-03-02 RX ORDER — SODIUM CHLORIDE 0.9 % (FLUSH) 0.9 %
30 SYRINGE (ML) INJECTION ONCE
Status: COMPLETED | OUTPATIENT
Start: 2022-03-02 | End: 2022-03-02

## 2022-03-02 RX ORDER — ONDANSETRON 2 MG/ML
8 INJECTION INTRAMUSCULAR; INTRAVENOUS
Status: CANCELLED | OUTPATIENT
Start: 2022-03-30

## 2022-03-02 RX ORDER — EPINEPHRINE 1 MG/ML
0.3 INJECTION, SOLUTION, CONCENTRATE INTRAVENOUS PRN
Status: CANCELLED | OUTPATIENT
Start: 2022-03-30

## 2022-03-02 RX ORDER — DIPHENHYDRAMINE HYDROCHLORIDE 50 MG/ML
50 INJECTION INTRAMUSCULAR; INTRAVENOUS
Status: CANCELLED | OUTPATIENT
Start: 2022-03-30

## 2022-03-02 RX ADMIN — SODIUM CHLORIDE, PRESERVATIVE FREE 30 ML: 5 INJECTION INTRAVENOUS at 10:47

## 2022-03-02 RX ADMIN — SODIUM CHLORIDE, PRESERVATIVE FREE 10 ML: 5 INJECTION INTRAVENOUS at 09:59

## 2022-03-02 RX ADMIN — VEDOLIZUMAB 300 MG: 300 INJECTION, POWDER, LYOPHILIZED, FOR SOLUTION INTRAVENOUS at 10:07

## 2022-03-02 NOTE — PROGRESS NOTES
Tolerated infusion well. Reviewed discharge instruction, voiced understanding. Copies of AVS given. Pt discharged home. Pt to exit via steady gait.     Orders Placed This Encounter   Medications    vedolizumab (ENTYVIO) 300 mg in sodium chloride 0.9 % 250 mL infusion    sodium chloride flush 0.9 % injection 30 mL    sodium chloride flush 0.9 % injection 5-40 mL

## 2022-03-08 ENCOUNTER — TELEPHONE (OUTPATIENT)
Dept: CARDIOLOGY CLINIC | Age: 72
End: 2022-03-08

## 2022-03-10 ENCOUNTER — OFFICE VISIT (OUTPATIENT)
Dept: CARDIOLOGY CLINIC | Age: 72
End: 2022-03-10
Payer: MEDICARE

## 2022-03-10 VITALS
HEART RATE: 68 BPM | SYSTOLIC BLOOD PRESSURE: 124 MMHG | DIASTOLIC BLOOD PRESSURE: 82 MMHG | BODY MASS INDEX: 32.88 KG/M2 | HEIGHT: 69 IN | WEIGHT: 222 LBS

## 2022-03-10 DIAGNOSIS — I47.1 ATRIAL TACHYCARDIA (HCC): Primary | ICD-10-CM

## 2022-03-10 DIAGNOSIS — Z98.890 S/P LEFT HEART CATHETERIZATION BY PERCUTANEOUS APPROACH: ICD-10-CM

## 2022-03-10 DIAGNOSIS — R94.31 ABNORMAL EKG: ICD-10-CM

## 2022-03-10 DIAGNOSIS — Z71.2 ENCOUNTER TO DISCUSS TEST RESULTS: ICD-10-CM

## 2022-03-10 DIAGNOSIS — I83.893 VARICOSE VEINS OF LOWER EXTREMITIES WITH COMPLICATIONS, BILATERAL: ICD-10-CM

## 2022-03-10 DIAGNOSIS — I48.92 PAROXYSMAL ATRIAL FLUTTER (HCC): ICD-10-CM

## 2022-03-10 PROCEDURE — 99214 OFFICE O/P EST MOD 30 MIN: CPT | Performed by: NURSE PRACTITIONER

## 2022-03-10 PROCEDURE — G8427 DOCREV CUR MEDS BY ELIG CLIN: HCPCS | Performed by: NURSE PRACTITIONER

## 2022-03-10 PROCEDURE — 1123F ACP DISCUSS/DSCN MKR DOCD: CPT | Performed by: NURSE PRACTITIONER

## 2022-03-10 PROCEDURE — G8484 FLU IMMUNIZE NO ADMIN: HCPCS | Performed by: NURSE PRACTITIONER

## 2022-03-10 PROCEDURE — G8400 PT W/DXA NO RESULTS DOC: HCPCS | Performed by: NURSE PRACTITIONER

## 2022-03-10 PROCEDURE — G8417 CALC BMI ABV UP PARAM F/U: HCPCS | Performed by: NURSE PRACTITIONER

## 2022-03-10 PROCEDURE — 1036F TOBACCO NON-USER: CPT | Performed by: NURSE PRACTITIONER

## 2022-03-10 PROCEDURE — 4040F PNEUMOC VAC/ADMIN/RCVD: CPT | Performed by: NURSE PRACTITIONER

## 2022-03-10 PROCEDURE — 1090F PRES/ABSN URINE INCON ASSESS: CPT | Performed by: NURSE PRACTITIONER

## 2022-03-10 PROCEDURE — 3017F COLORECTAL CA SCREEN DOC REV: CPT | Performed by: NURSE PRACTITIONER

## 2022-03-10 ASSESSMENT — ENCOUNTER SYMPTOMS
SHORTNESS OF BREATH: 1
COUGH: 0

## 2022-03-10 NOTE — PROGRESS NOTES
[address], [address] [facility],Third [address]  Phone: [phone]    Fax [phone]    Roxy Collins MD, Slava Tijerina MD, [address] [facility], MD, MD Ruth Wu MD Ulus Oxford, MD Sindy Kuster, MD Clyda Cruise, FABIEN Muniz, FABIEN Dubose, FABIEN Mcwilliams, FABIEN Kelly PA-C     CARDIOLOGY  NOTE    3/10/2022    Gretchen Chris (:  1950) is a 80 y.o. female,an established patient with Dr. Kristian Ontiveros, here for evaluation of the following chief complaint(s):  Follow-up (f/u from [facility]. pt does not have any more chest pain. pt states her s/o just had a stroke last week and has been stressed out going [facility] and forth from the hospital. pt does not smoke and drinks occasionally. pt has no future procedures )        SUBJECTIVE/OBJECTIVE:    Laurie Fraire is a 80y.o. year old who has Past medical history as noted below. She is here to review her [facility]. Her cardiac CTA for her atrial tachycardia ablation was seen to have abnormal LAD possibly. Stress test was done post Ablation. She has colitis and having a lot of diarrhea, She is struggling with RLS and Colitis making her life very difficult. She has had colitis for almost 1 year now ,Sees Dr Jennifer Albright . She is on prednisone , she has gained weight , she has been having palpiations heart thumping mostly at night and sometimes at rest,  \"feels like electrical shock\", she gest winded while walking in the [facility] without  she lost 2 sisters in a month . She gets dizzy and feels unbalanced when walking . Dr Michael Hampton did colostomy reveresed and also did leg venous ablation . She has put on 25 lbs since her last visit   She is having a lot of tremors and jerking of legs , she saw  neurology .  She is exhausted by just doing laundry and walking    She does report shortness of breath she does not walk much due to shortness of breath she is a strong family history of early coronary artery disease. She had colostomy reversal which has helped She is on Requip    As such she denies any chest pain but she does not do much.         Review of Systems   Constitutional: Negative for fatigue and fever. Respiratory: Positive for shortness of breath. Negative for cough. Cardiovascular: Negative for chest pain, palpitations and leg swelling. Musculoskeletal: Negative for arthralgias and gait problem. Neurological: Negative for dizziness, syncope, weakness, light-headedness and headaches. Vitals:    03/10/22 1028   BP: 124/82   Pulse: 68   Weight: 222 lb (100.7 kg)   Height: 5' 9\" (1.753 m)       Wt Readings from Last 3 Encounters:   03/10/22 222 lb (100.7 kg)   02/25/22 218 lb (98.9 kg)   02/21/22 218 lb (98.9 kg)       BP Readings from Last 3 Encounters:   03/10/22 124/82   03/02/22 119/69   02/25/22 113/73       Prior to Admission medications    Medication Sig Start Date End Date Taking? Authorizing Provider   magnesium 30 MG tablet Take 250 mg by mouth daily   Yes Historical Provider, MD   metoprolol tartrate (LOPRESSOR) 25 MG tablet Take 1 tablet by mouth 2 times daily 1/25/22  Yes FABIEN Simpson - CNP   apixaban (ELIQUIS) 5 MG TABS tablet Take 1 tablet by mouth 2 times daily Samples Lot# [id number] exp. 8/23 1/25/22  Yes Pennie Ybarra MD   lansoprazole (PREVACID) 30 MG delayed release capsule TAKE 1 CAPSULE BY MOUTH ONCE DAILY BEFORE A MEAL 2/15/21  Yes Historical Provider, MD   ferrous sulfate (IRON 325) 325 (65 Fe) MG tablet Take 325 mg by mouth 3 times daily (with meals)   Yes Historical Provider, MD   Cholecalciferol (VITAMIN D3) 1.25 MG (82676 UT) TABS Take by mouth   Yes Historical Provider, MD   gabapentin (NEURONTIN) 300 MG capsule Take 300 mg by mouth.  7/17/20  Yes Historical Provider, MD   Handicap Placard MISC by Does not apply route 6/5/19  Yes Mallika Rizo MD   atorvastatin (LIPITOR) 10 MG tablet Take 10 mg by mouth daily Pt states she's taking 3x wk   Yes Historical Provider, MD   melatonin 3 MG TABS tablet Take 20 mg by mouth daily  Patient not taking: Reported on 3/10/2022    Historical Provider, MD       Physical Exam  Vitals reviewed. Constitutional:       General: She is not in acute distress. Appearance: Normal appearance. She is obese. She is not ill-appearing. HENT:      Head: Atraumatic. Neck:      Vascular: No carotid bruit. Cardiovascular:      Rate and Rhythm: Normal rate and regular rhythm. Pulses: Normal pulses. Heart sounds: Normal heart sounds. No murmur heard. Pulmonary:      Effort: Pulmonary effort is normal. No respiratory distress. Breath sounds: Normal breath sounds. Musculoskeletal:         General: No swelling or deformity. Cervical back: Neck supple. No muscular tenderness. Skin:     Comments: right wrist site is soft without hematoma, bruising or bleeding noted. Neurological:      Mental Status: She is alert.          Health Maintenance   Topic Date Due    Hepatitis C screen  Never done    COVID-19 Vaccine (1) Never done    Depression Screen  Never done    Breast cancer screen  Never done    Shingles Vaccine (1 of 2) Never done    DEXA (modify frequency per FRAX score)  Never done   [employer] Visit (AWV)  Never done    Colorectal Cancer Screen  12/16/2021    Lipid screen  12/02/2022    DTaP/Tdap/Td vaccine (2 - Td or Tdap) 03/02/2027    Flu vaccine  Completed    Pneumococcal 65+ years Vaccine  Completed    Hepatitis A vaccine  Aged Out    Hepatitis B vaccine  Aged Out    Hib vaccine  Aged Out    Meningococcal (ACWY) vaccine  Aged Out       Lab Review   Lab Results   Component Value Date    CHOL 131 12/02/2021    TRIG 87 12/02/2021    HDL 45 12/02/2021     Stress test  10/17/19   Stress Type: Exercise      Peak HR: 131 bpm                       HR Response: Appropriate   Peak BP: 158/80 mmHg                   BP Response: Normal resting BP -   Predicted HR: 152 bpm                  appropriate response   % of predicted HR: 86                  HR BP Product: [id number]   Exercise Duration: 04:00 min           Max Exercise: 5.8 METS   Reason for Termination: Target heart   rate                                   Exercise Effort: Fair     Echo 10/17/19   Summary   Normal left ventricle structure and systolic function with an ejection   fraction of 55-60%.  Grade I diastolic dysfunction.   Sclerotic, but non-stenotic aortic valve.   Mild aortic regurgitation with a pressure half time of 823.   Normal pulmonary artery pressure.   No evidence of pericardial effusion.     Cardiac CTA 1/31/2022  IMPRESSION:  Normal pulmonary vein angiogram. NO THUY thrombus. Normal ejection fraction of 68%  Ostial LAD and mid LAD has 60-70% eccentric lesion no other significant obstructive disease    NM Stress test 2/11/2022  Summary   Supervising physician Dr. Norma Begum .  [place] portion of stress test is negative for ischemia by diagnostic criteria. [address] noted completed 7 METS and 5 Mins of exercise   Normal EF 52 % with normal ventricular contractility.   Anterior and septal wall shows medium size moderate ischemia   Ischemia in LAD territory    Cath 2/25/2022   Procedure Summary   Access : radial Indication : abnormal stress test /coronary   calcification on Cardiac cta   1. LAD and Circ are widely patent   2. RCA is widely patent   3. LVEDP was 10 mmHG    Angiographic Findings   Diagnostic Findings    Cardiac Arteries and Lesion Findings   LMCA: Normal and No Angiographicalyl Significant Disease. widely patent   LAD: Normal (no stenosis %) and No Angiographicalyl Significant Disease. Widely  patent , 3 small diag which are patent   LCx: Normal (no stenosis %) and No Angiographicalyl Significant Disease. Circ  is patent , OM are patent   RCA: Normal (no stenosis %) and No Angiographicalyl Significant Disease. RCA is  widely patent , PDA and PLV are widely patent        ASSESSMENT/PLAN:    Abnormal Cardiac CTA/ Abnormal NM stress  SP [facility] that does not show any CAD. Palpitations/ Atrial tachycardia  SP Right sided atrial tachycardia   Continue to follow up with EPS.    On eliquis without bleeding issues.      Restless leg and tremors  Related to RLS      Hypertension  Controlled  advised low salt diet   On metoprolol. No reported adverse events or reported side effects from medication(s). She is stable on current dose:  will continue     SOB (shortness of breath)  She has lower extremity swelling and shortness of breath but echo is normal , leg venous ablation was done by Dr Tricia Fernandez, encouraged to walk as much as she can. Reviewed chair exercises, too       Dyslipidemia  All available lab work was reviewed. Patient was advised to repeat lab work before next visit. Necessary orders were placed , instructions given by myself   Counseled extensively and medication compliance urged. We discussed that for the  prevention of ASCVD our  goal is aggressive risk modification. Patient is encouraged to exercise even a brisk walk for 30 minutes  at least 3 to 4 times a week   Various goals were discussed and questions answered. Continue current medications. Appropriate prescriptions are addressed and refills ordered. Questions answered and patient verbalizes understanding. Call for any problems, questions, or concerns. Follow up with EPS as scheduled. Follow up with Cardiology as as needed      An electronic signature was used to authenticate this note.     Electronically signed by FABIEN Benitez CNP on 3/10/2022 at 10:38 AM

## 2022-03-17 ENCOUNTER — OFFICE VISIT (OUTPATIENT)
Dept: CARDIOLOGY CLINIC | Age: 72
End: 2022-03-17
Payer: MEDICARE

## 2022-03-17 VITALS
HEIGHT: 69 IN | HEART RATE: 82 BPM | DIASTOLIC BLOOD PRESSURE: 68 MMHG | BODY MASS INDEX: 32.88 KG/M2 | WEIGHT: 222 LBS | SYSTOLIC BLOOD PRESSURE: 130 MMHG

## 2022-03-17 DIAGNOSIS — I47.1 ATRIAL TACHYCARDIA (HCC): Primary | ICD-10-CM

## 2022-03-17 DIAGNOSIS — I49.3 PVC (PREMATURE VENTRICULAR CONTRACTION): ICD-10-CM

## 2022-03-17 PROCEDURE — G8417 CALC BMI ABV UP PARAM F/U: HCPCS | Performed by: NURSE PRACTITIONER

## 2022-03-17 PROCEDURE — 99214 OFFICE O/P EST MOD 30 MIN: CPT | Performed by: NURSE PRACTITIONER

## 2022-03-17 PROCEDURE — G8400 PT W/DXA NO RESULTS DOC: HCPCS | Performed by: NURSE PRACTITIONER

## 2022-03-17 PROCEDURE — 93000 ELECTROCARDIOGRAM COMPLETE: CPT | Performed by: NURSE PRACTITIONER

## 2022-03-17 PROCEDURE — 1123F ACP DISCUSS/DSCN MKR DOCD: CPT | Performed by: NURSE PRACTITIONER

## 2022-03-17 PROCEDURE — 1036F TOBACCO NON-USER: CPT | Performed by: NURSE PRACTITIONER

## 2022-03-17 PROCEDURE — G8484 FLU IMMUNIZE NO ADMIN: HCPCS | Performed by: NURSE PRACTITIONER

## 2022-03-17 PROCEDURE — G8427 DOCREV CUR MEDS BY ELIG CLIN: HCPCS | Performed by: NURSE PRACTITIONER

## 2022-03-17 PROCEDURE — 1090F PRES/ABSN URINE INCON ASSESS: CPT | Performed by: NURSE PRACTITIONER

## 2022-03-17 PROCEDURE — 3017F COLORECTAL CA SCREEN DOC REV: CPT | Performed by: NURSE PRACTITIONER

## 2022-03-17 PROCEDURE — 4040F PNEUMOC VAC/ADMIN/RCVD: CPT | Performed by: NURSE PRACTITIONER

## 2022-03-17 RX ORDER — MAGNESIUM OXIDE 400 MG/1
400 TABLET ORAL DAILY
Qty: 30 TABLET | Refills: 1
Start: 2022-03-17

## 2022-03-17 RX ORDER — METOPROLOL TARTRATE 50 MG/1
50 TABLET, FILM COATED ORAL 2 TIMES DAILY
Qty: 60 TABLET | Refills: 2 | Status: SHIPPED | OUTPATIENT
Start: 2022-03-17 | End: 2022-07-18 | Stop reason: SDUPTHER

## 2022-03-17 ASSESSMENT — ENCOUNTER SYMPTOMS
ABDOMINAL DISTENTION: 0
EYE REDNESS: 0
DIARRHEA: 0
WHEEZING: 0
SINUS PAIN: 0
CHEST TIGHTNESS: 0
SINUS PRESSURE: 0
COLOR CHANGE: 0
BACK PAIN: 0
NAUSEA: 0
CONSTIPATION: 0
SHORTNESS OF BREATH: 0
VOMITING: 0
EYE ITCHING: 0
COUGH: 0
ABDOMINAL PAIN: 0
EYE PAIN: 0
BLOOD IN STOOL: 0

## 2022-03-17 NOTE — PROGRESS NOTES
Electrophysiology follow-up note      Reason for consultation:  Atrial flutter / ? afib    Chief complaint: Here for follow-up on right-sided atrial tachycardia    Referring physician: Heri Choe      Primary care physician: Tina Dyer MD      History of Present Illness: This visit 3/17/2022  Patient is here today for 1 month follow-up status post ablation of atrial tachycardia. Patient reports that she has been under a lot of stress. She states that she does not have warning water at her house. She states that her significant other had a stroke and has been in the hospital since the last office visit. She states that she has been very anxious. She denies chest pain, palpitations, shortness of breath, lightheadedness, dizziness, edema or syncope. Previous visit 2/8/2022  Patient is here today for 1 week follow-up status post ablation of atrial tachycardia. She denies chest pain, palpitations, shortness of breath, lightheadedness, dizziness, edema or syncope. Patient is a 81-year-old female with history of colitis referred by Dr. Sallie Mcgraw for atrial flutter. Patient reports she she states she has chest pressure and feels shortness of breath most of the time. She also has dizziness upon standing. She states that her heart races at any time and this causes symptoms mentioned above. And usually the symptoms last for a few minutes. Patient has swelling in the ankles and feet after a long day but goes away in the morning when she sleeps. Patient drinks cup of coffee in the morning.   Patient drinks Diet Coke daily        Pastmedical history:   Past Medical History:   Diagnosis Date    Abnormal EKG     Bradycardia     Chronic colitis 01/20/2022    Edema leg     Family history of coronary artery disease     Former smoker     H/O percutaneous left heart catheterization 02/25/2022    Normal coronaries    History of cardiac monitoring 10/18/2019    48 hr monitor, Short runs of SVT, few PAC and PVC noted, no significant arrhythmias, no significant pauses.  Hx of cardiovascular stress test 10/17/2019    Fair Exercise Tolerance. No chest pain noted during testing.  Hx of Doppler echocardiogram 10/17/2019    Normal left ventricle structure and systolic function with an ejection fraction of 55-60%. Grade I diastolic dysfunction. Sclerotic, but non-stenotic aortic valve. Mild aortic regurgitation with a pressure half time of 823. Normal pulmonary artery pressure. No evidence of pericardial effusion.  Hyperlipidemia     Paroxysmal atrial flutter (HCC) 12/22/2021    Exercise induced 12/22/2021 stress test    Pre-op evaluation 10/2019    PVC's (premature ventricular contractions)     SOB (shortness of breath)     Ulcerative colitis with complication ([facility] [address] 94/24/2549    Ulcerative colitis, acute, without complications ([address] 65/49/3190       Surgical history :   Past Surgical History:   Procedure Laterality Date    ABLATION OF DYSRHYTHMIC FOCUS  02/02/2022    atrial tachycardia ablation    COLOSTOMY N/A 05/09/2019    BOWEL RESECTION COLOSTOMY HARTMANS PROCEDURE INCIDENTAL APPENDECTOMY MULTIPLE SMALL BOWEL SEROSAL REPAIR performed by Damian Reyes MD at [place] [address]         Family history:   Family History   Problem Relation Age of Onset    Dementia Mother     Cancer Father     Lung Cancer Sister     Colon Cancer Sister        Social history :  reports that she has quit smoking. She has never used smokeless tobacco. She reports current alcohol use. She reports that she does not use drugs.     No Known Allergies    Current Outpatient Medications on File Prior to Visit   Medication Sig Dispense Refill    apixaban (ELIQUIS) 5 MG TABS tablet Take 1 tablet by mouth 2 times daily for 14 days 28 tablet 0    magnesium 30 MG tablet Take 250 mg by mouth daily      metoprolol tartrate (LOPRESSOR) 25 MG tablet Take 1 tablet by mouth 2 times daily 180 tablet 3    apixaban (ELIQUIS) 5 MG TABS tablet Take 1 tablet by mouth 2 times daily Samples Lot# [id number] exp. 8/23 42 tablet 0    melatonin 3 MG TABS tablet Take 20 mg by mouth daily       lansoprazole (PREVACID) 30 MG delayed release capsule TAKE 1 CAPSULE BY MOUTH ONCE DAILY BEFORE A MEAL      ferrous sulfate (IRON 325) 325 (65 Fe) MG tablet Take 325 mg by mouth 3 times daily (with meals)      Cholecalciferol (VITAMIN D3) 1.25 MG (75157 UT) TABS Take by mouth      gabapentin (NEURONTIN) 300 MG capsule Take 300 mg by mouth.  Handicap Placard MISC by Does not apply route 1 each 0    atorvastatin (LIPITOR) 10 MG tablet Take 10 mg by mouth daily Pt states she's taking 3x wk       No current facility-administered medications on file prior to visit. Review of Systems:   Review of Systems   Constitutional: Negative for activity change, chills, fatigue and fever. HENT: Negative for congestion, ear pain, sinus pressure and sinus pain. Eyes: Negative for pain, redness, itching and visual disturbance. Respiratory: Negative for cough, chest tightness, shortness of breath and wheezing. Cardiovascular: Negative for chest pain, palpitations and leg swelling. Gastrointestinal: Negative for abdominal distention, abdominal pain, blood in stool, constipation, diarrhea, nausea and vomiting. Endocrine: Negative for cold intolerance and heat intolerance. Genitourinary: Negative for difficulty urinating, dysuria and hematuria. Musculoskeletal: Positive for arthralgias. Negative for back pain, myalgias and neck stiffness. Skin: Negative for color change, pallor, rash and wound. Allergic/Immunologic: Negative for food allergies. Neurological: Negative for dizziness, syncope, light-headedness, numbness and headaches. Hematological: Does not bruise/bleed easily. Psychiatric/Behavioral: Negative for agitation, behavioral problems and confusion.  The patient is nervous/anxious. Examination:      Vitals:    03/17/22 0849   BP: 130/68   Pulse: 82   Weight: 222 lb (100.7 kg)   Height: 5' 9\" (1.753 m)        Body mass index is 32.78 kg/m². Physical Exam  Constitutional:       Appearance: Normal appearance. She is not ill-appearing. HENT:      Head: Normocephalic and atraumatic. Right Ear: External ear normal.      Left Ear: External ear normal.      Nose: No congestion or rhinorrhea. Mouth/Throat:      Mouth: Mucous membranes are moist.      Pharynx: Oropharynx is clear. Eyes:      General:         Right eye: No discharge. Left eye: No discharge. Conjunctiva/sclera: Conjunctivae normal.   Cardiovascular:      Rate and Rhythm: Normal rate and regular rhythm. Heart sounds: No murmur heard. Comments: Frequent ectopic beats  Pulmonary:      Effort: Pulmonary effort is normal.      Breath sounds: Normal breath sounds. No wheezing or rhonchi. Abdominal:      General: Abdomen is flat. Palpations: Abdomen is soft. Musculoskeletal:         General: Normal range of motion. Cervical back: Normal range of motion. Right lower leg: No edema. Left lower leg: No edema. Skin:     General: Skin is warm and dry. Neurological:      General: No focal deficit present. Mental Status: She is alert and oriented to person, place, and time. Psychiatric:         Mood and Affect: Mood normal.         Thought Content:  Thought content normal.           CBC:   Lab Results   Component Value Date    WBC 13.4 02/25/2022    HGB 12.1 02/25/2022    HCT 39.5 02/25/2022     02/25/2022     Lipids:  Lab Results   Component Value Date    CHOL 131 12/02/2021    TRIG 87 12/02/2021    HDL 45 12/02/2021    LDLCALC 69 12/02/2021     PT/INR:   Lab Results   Component Value Date    INR 0.92 02/25/2022        BMP:    Lab Results   Component Value Date     02/25/2022    K 3.5 02/25/2022     02/25/2022    CO2 28 02/25/2022 BUN 22 02/25/2022     CMP:   Lab Results   Component Value Date    AST 14 12/27/2021    PROT 6.8 12/27/2021    BILITOT 0.2 12/27/2021    ALKPHOS 69 12/27/2021     TSH:    EKG Interpretation:  SINUS RHYTHM , PVC      IMPRESSION / RECOMMENDATIONS:     Status post ablation of atrial tachycardia  Frequent PVC's  Sleep apnea  History of diverticulitis status post surgery colostomy now she has colitis  Obesity BMI 32    EKG obtained patient noted to be in sinus rhythm with heart of 82 with bigeminy PVC's  No atrial tachycardia noted on EKG  Patient is asymptomatic  She denies palpitations or tachycardia    We will cincrease Lopressor 50 mg twice daily and Eliquis 5 mg twice daily    Discussed with the patient about event monitor to assess for PVC burden. Patient states that right now she is under a lot of stress and would like to wait. We will reevaluate and 2 months  Patient to call us if she becomes symptomatic      Thanks again for allowing me to participate in care of this patient. Please call me if you have any questions. With best regards. FABIEN Huerta - CNP, 3/17/2022 9:04 AM     Please note this report has been partially produced using speech recognition software and may contain errors related to that system including errors in grammar, punctuation, and spelling, as well as words and phrases that may be inappropriate. If there are any questions or concerns please feel free to contact the dictating provider for clarification.

## 2022-03-17 NOTE — PATIENT INSTRUCTIONS
Please be informed that if you contact our office outside of normal business hours the physician on call cannot help with any scheduling or rescheduling issues, procedure instruction questions or any type of medication issue. We advise you for any urgent/emergency that you go to the nearest emergency room! PLEASE CALL OUR OFFICE DURING NORMAL BUSINESS HOURS    Monday - Friday   8 am to 5 pm    [place]: [address]: [phone]    [place]:  [phone]    **It is YOUR responsibilty to bring medication bottles and/or updated medication list to [address] [place] [address].  This will allow us to better serve you and all your healthcare needs**

## 2022-03-30 ENCOUNTER — HOSPITAL ENCOUNTER (OUTPATIENT)
Dept: INFUSION THERAPY | Age: 72
Setting detail: INFUSION SERIES
Discharge: HOME | End: 2022-03-30
Payer: MEDICARE

## 2022-03-30 VITALS
HEART RATE: 66 BPM | OXYGEN SATURATION: 100 % | SYSTOLIC BLOOD PRESSURE: 119 MMHG | TEMPERATURE: 97.3 F | DIASTOLIC BLOOD PRESSURE: 52 MMHG | RESPIRATION RATE: 16 BRPM

## 2022-03-30 DIAGNOSIS — K51.90 ULCERATIVE COLITIS, ACUTE, WITHOUT COMPLICATIONS (HCC): Primary | ICD-10-CM

## 2022-03-30 DIAGNOSIS — K52.9 CHRONIC COLITIS: ICD-10-CM

## 2022-03-30 PROCEDURE — 99211 OFF/OP EST MAY X REQ PHY/QHP: CPT

## 2022-03-30 PROCEDURE — 2580000003 HC RX 258: Performed by: SPECIALIST

## 2022-03-30 PROCEDURE — 96365 THER/PROPH/DIAG IV INF INIT: CPT

## 2022-03-30 PROCEDURE — 6360000002 HC RX W HCPCS: Performed by: SPECIALIST

## 2022-03-30 RX ORDER — SODIUM CHLORIDE 0.9 % (FLUSH) 0.9 %
30 SYRINGE (ML) INJECTION ONCE
Status: CANCELLED | OUTPATIENT
Start: 2022-05-25

## 2022-03-30 RX ORDER — SODIUM CHLORIDE 9 MG/ML
INJECTION, SOLUTION INTRAVENOUS CONTINUOUS
OUTPATIENT
Start: 2022-05-25

## 2022-03-30 RX ORDER — EPINEPHRINE 1 MG/ML
0.3 INJECTION, SOLUTION, CONCENTRATE INTRAVENOUS PRN
OUTPATIENT
Start: 2022-05-25

## 2022-03-30 RX ORDER — ACETAMINOPHEN 325 MG/1
650 TABLET ORAL
OUTPATIENT
Start: 2022-05-25

## 2022-03-30 RX ORDER — HEPARIN SODIUM (PORCINE) LOCK FLUSH IV SOLN 100 UNIT/ML 100 UNIT/ML
500 SOLUTION INTRAVENOUS PRN
OUTPATIENT
Start: 2022-05-25

## 2022-03-30 RX ORDER — SODIUM CHLORIDE 0.9 % (FLUSH) 0.9 %
30 SYRINGE (ML) INJECTION ONCE
OUTPATIENT
Start: 2022-05-25

## 2022-03-30 RX ORDER — ALBUTEROL SULFATE 90 UG/1
4 AEROSOL, METERED RESPIRATORY (INHALATION) PRN
OUTPATIENT
Start: 2022-05-25

## 2022-03-30 RX ORDER — SODIUM CHLORIDE 0.9 % (FLUSH) 0.9 %
5-40 SYRINGE (ML) INJECTION PRN
Status: DISCONTINUED | OUTPATIENT
Start: 2022-03-30 | End: 2022-03-31 | Stop reason: HOSPADM

## 2022-03-30 RX ORDER — SODIUM CHLORIDE 0.9 % (FLUSH) 0.9 %
30 SYRINGE (ML) INJECTION ONCE
Status: COMPLETED | OUTPATIENT
Start: 2022-03-30 | End: 2022-03-30

## 2022-03-30 RX ORDER — HEPARIN SODIUM (PORCINE) LOCK FLUSH IV SOLN 100 UNIT/ML 100 UNIT/ML
500 SOLUTION INTRAVENOUS PRN
Status: DISCONTINUED | OUTPATIENT
Start: 2022-03-30 | End: 2022-03-31 | Stop reason: HOSPADM

## 2022-03-30 RX ORDER — ONDANSETRON 2 MG/ML
8 INJECTION INTRAMUSCULAR; INTRAVENOUS
OUTPATIENT
Start: 2022-05-25

## 2022-03-30 RX ORDER — SODIUM CHLORIDE 0.9 % (FLUSH) 0.9 %
5-40 SYRINGE (ML) INJECTION PRN
OUTPATIENT
Start: 2022-05-25

## 2022-03-30 RX ORDER — DIPHENHYDRAMINE HYDROCHLORIDE 50 MG/ML
50 INJECTION INTRAMUSCULAR; INTRAVENOUS
OUTPATIENT
Start: 2022-05-25

## 2022-03-30 RX ADMIN — SODIUM CHLORIDE, PRESERVATIVE FREE 30 ML: 5 INJECTION INTRAVENOUS at 10:37

## 2022-03-30 RX ADMIN — VEDOLIZUMAB 300 MG: 300 INJECTION, POWDER, LYOPHILIZED, FOR SOLUTION INTRAVENOUS at 10:03

## 2022-03-30 NOTE — PROGRESS NOTES
Pt arrived on unit. Oriented to room, call light, and chair/bed controls with understanding voiced. Pt is aware of and agreeable to POC. Pt complains of bleeding rectally. She states that she talked to Dr. Maycol Andersen yesterday and he advised her to go to ER, but pt states she did feel up to it. I spoke with Dr. Maycol Andersen and confirmed that he did want pt to receive Entyvio infusion. He again advises pt to go to Er after infusion. I informed pt of the conversation with Emerald Martin and she voiced understanding.

## 2022-03-30 NOTE — PROGRESS NOTES
Tolerated INFUSION well. Reviewed discharge instruction, voiced understanding. Copies of AVS given. Pt discharged to the ER, however pt refuses to go to ER at this time d/t SO is in ICU and she wishes to visit with him first. Caroline Mckeon Gold her to be seen in ER, she states she will go after she goes to ICU.     Orders Placed This Encounter   Medications    vedolizumab (ENTYVIO) 300 mg in sodium chloride 0.9 % 250 mL infusion    sodium chloride flush 0.9 % injection 30 mL    sodium chloride flush 0.9 % injection 5-40 mL    heparin flush 100 UNIT/ML injection 500 Units

## 2022-04-03 ENCOUNTER — HOSPITAL ENCOUNTER (EMERGENCY)
Age: 72
Discharge: HOME | End: 2022-04-03
Payer: MEDICARE

## 2022-04-03 ENCOUNTER — APPOINTMENT (OUTPATIENT)
Dept: CT IMAGING | Age: 72
End: 2022-04-03
Payer: MEDICARE

## 2022-04-03 VITALS
DIASTOLIC BLOOD PRESSURE: 64 MMHG | BODY MASS INDEX: 31.99 KG/M2 | TEMPERATURE: 98.6 F | OXYGEN SATURATION: 92 % | HEIGHT: 69 IN | HEART RATE: 66 BPM | WEIGHT: 216 LBS | SYSTOLIC BLOOD PRESSURE: 112 MMHG | RESPIRATION RATE: 14 BRPM

## 2022-04-03 DIAGNOSIS — Z79.01 ANTICOAGULATED: ICD-10-CM

## 2022-04-03 DIAGNOSIS — K51.911 ULCERATIVE COLITIS WITH RECTAL BLEEDING, UNSPECIFIED LOCATION (HCC): Primary | ICD-10-CM

## 2022-04-03 LAB
ABO/RH: NORMAL
ALBUMIN SERPL-MCNC: 4 GM/DL (ref 3.4–5)
ALP BLD-CCNC: 82 IU/L (ref 40–129)
ALT SERPL-CCNC: 15 U/L (ref 10–40)
ANION GAP SERPL CALCULATED.3IONS-SCNC: 11 MMOL/L (ref 4–16)
ANTIBODY SCREEN: NEGATIVE
APTT: 36.5 SECONDS (ref 25.1–37.1)
AST SERPL-CCNC: 20 IU/L (ref 15–37)
BASOPHILS ABSOLUTE: 0.1 K/CU MM
BASOPHILS RELATIVE PERCENT: 0.8 % (ref 0–1)
BILIRUB SERPL-MCNC: 0.3 MG/DL (ref 0–1)
BUN BLDV-MCNC: 7 MG/DL (ref 6–23)
CALCIUM SERPL-MCNC: 9.2 MG/DL (ref 8.3–10.6)
CHLORIDE BLD-SCNC: 104 MMOL/L (ref 99–110)
CO2: 24 MMOL/L (ref 21–32)
CREAT SERPL-MCNC: 1 MG/DL (ref 0.6–1.1)
DIFFERENTIAL TYPE: ABNORMAL
EOSINOPHILS ABSOLUTE: 0.7 K/CU MM
EOSINOPHILS RELATIVE PERCENT: 7 % (ref 0–3)
GFR AFRICAN AMERICAN: >60 ML/MIN/1.73M2
GFR NON-AFRICAN AMERICAN: 55 ML/MIN/1.73M2
GLUCOSE BLD-MCNC: 103 MG/DL (ref 70–99)
HCT VFR BLD CALC: 41.1 % (ref 37–47)
HEMOGLOBIN: 12.9 GM/DL (ref 12.5–16)
IMMATURE NEUTROPHIL %: 1.7 % (ref 0–0.43)
INR BLD: 1.46 INDEX
LACTATE: 1.4 MMOL/L (ref 0.4–2)
LIPASE: 22 IU/L (ref 13–60)
LYMPHOCYTES ABSOLUTE: 1.3 K/CU MM
LYMPHOCYTES RELATIVE PERCENT: 12.8 % (ref 24–44)
MCH RBC QN AUTO: 29 PG (ref 27–31)
MCHC RBC AUTO-ENTMCNC: 31.4 % (ref 32–36)
MCV RBC AUTO: 92.4 FL (ref 78–100)
MONOCYTES ABSOLUTE: 0.7 K/CU MM
MONOCYTES RELATIVE PERCENT: 7.2 % (ref 0–4)
NUCLEATED RBC %: 0 %
PDW BLD-RTO: 13 % (ref 11.7–14.9)
PLATELET # BLD: 424 K/CU MM (ref 140–440)
PMV BLD AUTO: 9.2 FL (ref 7.5–11.1)
POTASSIUM SERPL-SCNC: 4.1 MMOL/L (ref 3.5–5.1)
PROTHROMBIN TIME: 18.9 SECONDS (ref 11.7–14.5)
RBC # BLD: 4.45 M/CU MM (ref 4.2–5.4)
SEGMENTED NEUTROPHILS ABSOLUTE COUNT: 6.9 K/CU MM
SEGMENTED NEUTROPHILS RELATIVE PERCENT: 70.5 % (ref 36–66)
SODIUM BLD-SCNC: 139 MMOL/L (ref 135–145)
TOTAL IMMATURE NEUTOROPHIL: 0.17 K/CU MM
TOTAL NUCLEATED RBC: 0 K/CU MM
TOTAL PROTEIN: 7.3 GM/DL (ref 6.4–8.2)
WBC # BLD: 9.8 K/CU MM (ref 4–10.5)

## 2022-04-03 PROCEDURE — 80053 COMPREHEN METABOLIC PANEL: CPT

## 2022-04-03 PROCEDURE — 96374 THER/PROPH/DIAG INJ IV PUSH: CPT

## 2022-04-03 PROCEDURE — 74177 CT ABD & PELVIS W/CONTRAST: CPT

## 2022-04-03 PROCEDURE — 85730 THROMBOPLASTIN TIME PARTIAL: CPT

## 2022-04-03 PROCEDURE — 6360000004 HC RX CONTRAST MEDICATION: Performed by: PHYSICIAN ASSISTANT

## 2022-04-03 PROCEDURE — 86850 RBC ANTIBODY SCREEN: CPT

## 2022-04-03 PROCEDURE — 86901 BLOOD TYPING SEROLOGIC RH(D): CPT

## 2022-04-03 PROCEDURE — 83690 ASSAY OF LIPASE: CPT

## 2022-04-03 PROCEDURE — 85025 COMPLETE CBC W/AUTO DIFF WBC: CPT

## 2022-04-03 PROCEDURE — 99284 EMERGENCY DEPT VISIT MOD MDM: CPT

## 2022-04-03 PROCEDURE — 6360000002 HC RX W HCPCS: Performed by: PHYSICIAN ASSISTANT

## 2022-04-03 PROCEDURE — 85610 PROTHROMBIN TIME: CPT

## 2022-04-03 PROCEDURE — 83605 ASSAY OF LACTIC ACID: CPT

## 2022-04-03 PROCEDURE — 86900 BLOOD TYPING SEROLOGIC ABO: CPT

## 2022-04-03 PROCEDURE — 93005 ELECTROCARDIOGRAM TRACING: CPT | Performed by: PHYSICIAN ASSISTANT

## 2022-04-03 PROCEDURE — 2580000003 HC RX 258: Performed by: PHYSICIAN ASSISTANT

## 2022-04-03 PROCEDURE — 96375 TX/PRO/DX INJ NEW DRUG ADDON: CPT

## 2022-04-03 RX ORDER — 0.9 % SODIUM CHLORIDE 0.9 %
1000 INTRAVENOUS SOLUTION INTRAVENOUS ONCE
Status: COMPLETED | OUTPATIENT
Start: 2022-04-03 | End: 2022-04-03

## 2022-04-03 RX ORDER — ONDANSETRON 2 MG/ML
4 INJECTION INTRAMUSCULAR; INTRAVENOUS EVERY 30 MIN PRN
Status: DISCONTINUED | OUTPATIENT
Start: 2022-04-03 | End: 2022-04-03 | Stop reason: HOSPADM

## 2022-04-03 RX ORDER — METHYLPREDNISOLONE SODIUM SUCCINATE 125 MG/2ML
60 INJECTION, POWDER, LYOPHILIZED, FOR SOLUTION INTRAMUSCULAR; INTRAVENOUS ONCE
Status: COMPLETED | OUTPATIENT
Start: 2022-04-03 | End: 2022-04-03

## 2022-04-03 RX ORDER — PREDNISONE 10 MG/1
40 TABLET ORAL DAILY
Qty: 28 TABLET | Refills: 0 | Status: SHIPPED | OUTPATIENT
Start: 2022-04-03 | End: 2022-04-10

## 2022-04-03 RX ADMIN — SODIUM CHLORIDE 1000 ML: 9 INJECTION, SOLUTION INTRAVENOUS at 12:22

## 2022-04-03 RX ADMIN — METHYLPREDNISOLONE SODIUM SUCCINATE 60 MG: 125 INJECTION, POWDER, FOR SOLUTION INTRAMUSCULAR; INTRAVENOUS at 12:25

## 2022-04-03 RX ADMIN — ONDANSETRON 4 MG: 2 INJECTION INTRAMUSCULAR; INTRAVENOUS at 12:23

## 2022-04-03 RX ADMIN — IOPAMIDOL 75 ML: 755 INJECTION, SOLUTION INTRAVENOUS at 13:49

## 2022-04-03 ASSESSMENT — PAIN DESCRIPTION - DESCRIPTORS: DESCRIPTORS: CRAMPING

## 2022-04-03 ASSESSMENT — PAIN - FUNCTIONAL ASSESSMENT: PAIN_FUNCTIONAL_ASSESSMENT: 0-10

## 2022-04-03 ASSESSMENT — PAIN SCALES - GENERAL
PAINLEVEL_OUTOF10: 9
PAINLEVEL_OUTOF10: 3

## 2022-04-03 ASSESSMENT — PAIN DESCRIPTION - LOCATION: LOCATION: ABDOMEN

## 2022-04-03 ASSESSMENT — PAIN DESCRIPTION - FREQUENCY: FREQUENCY: INTERMITTENT

## 2022-04-03 ASSESSMENT — PAIN DESCRIPTION - PAIN TYPE: TYPE: CHRONIC PAIN

## 2022-04-03 NOTE — ED PROVIDER NOTES
Patient Identification  Addy Ku is a 80 y.o. female    Chief Complaint  Rectal Bleeding (rectal bleeding x 1 month )      HPI  (History provided by patient)  This is a 80 y.o. female who was brought in by self for chief complaint of rectal bleeding. Onset was a month ago. Patient reports she has been having increasing amounts of bloody diarrhea multiple times a day. She is having difficulty even leaving the house secondary to the amount of diarrhea she is having. Has a history of ulcerative colitis and thinks it is flared currently. She is currently on Eliquis for approximately atrial flutter. She notes intermittent abdominal cramping that is 3 out of 10 and located throughout the lower abdomen. Denies any fevers or vomiting.       REVIEW OF SYSTEMS    Constitutional:  Denies fever, chills  HENT:  Denies sore throat or ear pain   Eyes: Denies vision changes, eye pain  Cardiovascular:  Denies chest pain, syncope  Respiratory:  Denies shortness of breath, cough   GI:  Denies nausea, vomiting.  + abdominal pain  :  Denies dysuria, discharge  Musculoskeletal:  Denies back pain, joint pain  Skin:  Denies rash, pruritis  Neurologic:  Denies headache, focal weakness, or sensory changes     See HPI and nursing notes for additional information     I have reviewed the following nursing documentation:  Allergies: No Known Allergies    Past medical history:  has a past medical history of Abnormal EKG, Bradycardia, Chronic colitis (01/20/2022), Edema leg, Family history of coronary artery disease, Former smoker, H/O percutaneous left heart catheterization (02/25/2022), History of cardiac monitoring (10/18/2019), cardiovascular stress test (10/17/2019), Doppler echocardiogram (10/17/2019), Hyperlipidemia, Paroxysmal atrial flutter ([address] (12/22/2021), Pre-op evaluation (10/2019), PVC's (premature ventricular contractions), SOB (shortness of breath), Ulcerative colitis with complication ([address] (84/57/9872), and Ulcerative colitis, acute, without complications ([facility] (60/03/6604). Past surgical history:  has a past surgical history that includes colostomy (N/A, 05/09/2019); laparotomy; Hemorrhoidectomy; and ablation of dysrhythmic focus (02/02/2022). Home medications:   Prior to Admission medications    Medication Sig Start Date End Date Taking? Authorizing Provider   predniSONE (DELTASONE) 10 MG tablet Take 4 tablets by mouth daily for 7 days 4/3/22 4/10/22 Yes Tanner Jorge PA-C   magnesium oxide (MAG-OX) 400 MG tablet Take 1 tablet by mouth daily 3/17/22   FABIEN Puri CNP   metoprolol tartrate (LOPRESSOR) 50 MG tablet Take 1 tablet by mouth 2 times daily 3/17/22   FABIEN Puri CNP   apixaban (ELIQUIS) 5 MG TABS tablet Take 1 tablet by mouth 2 times daily for 14 days 3/10/22 3/24/22  FABIEN Dumont CNP   apixaban (ELIQUIS) 5 MG TABS tablet Take 1 tablet by mouth 2 times daily Samples Lot# [id number] exp. 8/23 1/25/22   Leonela Gutierrez MD   melatonin 3 MG TABS tablet Take 20 mg by mouth daily     Historical Provider, MD   lansoprazole (PREVACID) 30 MG delayed release capsule TAKE 1 CAPSULE BY MOUTH ONCE DAILY BEFORE A MEAL 2/15/21   Historical Provider, MD   ferrous sulfate (IRON 325) 325 (65 Fe) MG tablet Take 325 mg by mouth 3 times daily (with meals)    Historical Provider, MD   Cholecalciferol (VITAMIN D3) 1.25 MG (97413 UT) TABS Take by mouth    Historical Provider, MD   gabapentin (NEURONTIN) 300 MG capsule Take 300 mg by mouth. 7/17/20   Historical Provider, MD   Handicap Placard MISC by Does not apply route 6/5/19   Rodney Garcia MD   atorvastatin (LIPITOR) 10 MG tablet Take 10 mg by mouth daily Pt states she's taking 3x wk    Historical Provider, MD       Social history:  reports that she has quit smoking. She has never used smokeless tobacco. She reports current alcohol use. She reports that she does not use drugs.     Family history:    Family History   Problem Relation Age of Onset    Dementia Mother     Cancer Father     Lung Cancer Sister     Colon Cancer Sister          Exam  /64   Pulse 66   Temp 98.6 °F (37 °C) (Oral)   Resp 14   Ht 5' 9\" (1.753 m)   Wt 216 lb (98 kg)   SpO2 92%   BMI 31.90 kg/m²   Nursing note and vitals reviewed. Constitutional: Well developed, well nourished. No acute distress. HENT:      Head: Normocephalic and atraumatic. Ears: External ears normal.      Nose: Nose normal.     Mouth: Membrane mucosa moist and pink. No posterior oropharynx erythema or tonsillar edema  Eyes: Anicteric sclera. No discharge, PERRL  Neck: Supple. Trachea midline. Cardiovascular: RRR, no murmurs, rubs, or gallops, radial pulses 2+ bilaterally. Pulmonary/Chest: Effort normal. No respiratory distress. CTAB. No stridor. No wheezes. No rales. Abdominal: Soft. Nontender to palpation. No distension. No guarding, rebound tenderness, or evidence of ascites. : No CVA tenderness. Rectal:  exam chaperoned by Darío Echevarria RN. No external lesions or rashes. No gross bleeding noted. Good rectal tone. No stool in vault. No gross blood. Mucus is Hemoccult negative. Musculoskeletal: Moves all extremities. No gross deformity. Neurological: Alert and oriented to person, place, and time. Normal muscle tone. Skin: Warm and dry. No rash. Psychiatric: Normal mood and affect. Behavior is normal.      Radiographs (if obtained):  [] The following radiograph was interpreted by myself in the absence of a radiologist:   [x] Radiologist's Report Reviewed:  CT ABDOMEN PELVIS W IV CONTRAST Additional Contrast? None   Final Result   Mild circumferential wall thickening and adjacent stranding of the descending   colon, sigmoid colon, and rectum compatible with colitis. No perforation or   free fluid. Status post partial sigmoidectomy and reanastomosis.                 Labs  Results for orders placed or performed during the hospital encounter of 04/03/22   CBC with Auto Differential   Result Value Ref Range    WBC 9.8 4.0 - 10.5 K/CU MM    RBC 4.45 4.2 - 5.4 M/CU MM    Hemoglobin 12.9 12.5 - 16.0 GM/DL    Hematocrit 41.1 37 - 47 %    MCV 92.4 78 - 100 FL    MCH 29.0 27 - 31 PG    MCHC 31.4 (L) 32.0 - 36.0 %    RDW 13.0 11.7 - 14.9 %    Platelets 337 769 - 011 K/CU MM    MPV 9.2 7.5 - 11.1 FL    Differential Type AUTOMATED DIFFERENTIAL     Segs Relative 70.5 (H) 36 - 66 %    Lymphocytes % 12.8 (L) 24 - 44 %    Monocytes % 7.2 (H) 0 - 4 %    Eosinophils % 7.0 (H) 0 - 3 %    Basophils % 0.8 0 - 1 %    Segs Absolute 6.9 K/CU MM    Lymphocytes Absolute 1.3 K/CU MM    Monocytes Absolute 0.7 K/CU MM    Eosinophils Absolute 0.7 K/CU MM    Basophils Absolute 0.1 K/CU MM    Nucleated RBC % 0.0 %    Total Nucleated RBC 0.0 K/CU MM    Total Immature Neutrophil 0.17 K/CU MM    Immature Neutrophil % 1.7 (H) 0 - 0.43 %   Comprehensive Metabolic Panel w/ Reflex to MG   Result Value Ref Range    Sodium 139 135 - 145 MMOL/L    Potassium 4.1 3.5 - 5.1 MMOL/L    Chloride 104 99 - 110 mMol/L    CO2 24 21 - 32 MMOL/L    BUN 7 6 - 23 MG/DL    CREATININE 1.0 0.6 - 1.1 MG/DL    Glucose 103 (H) 70 - 99 MG/DL    Calcium 9.2 8.3 - 10.6 MG/DL    Albumin 4.0 3.4 - 5.0 GM/DL    Total Protein 7.3 6.4 - 8.2 GM/DL    Total Bilirubin 0.3 0.0 - 1.0 MG/DL    ALT 15 10 - 40 U/L    AST 20 15 - 37 IU/L    Alkaline Phosphatase 82 40 - 129 IU/L    GFR Non- 55 (L) >60 mL/min/1.73m2    GFR African American >60 >60 mL/min/1.73m2    Anion Gap 11 4 - 16   Lactic Acid   Result Value Ref Range    Lactate 1.4 0.4 - 2.0 mMOL/L   APTT   Result Value Ref Range    aPTT 36.5 25.1 - 37.1 SECONDS   Protime-INR   Result Value Ref Range    Protime 18.9 (H) 11.7 - 14.5 SECONDS    INR 1.46 INDEX   Lipase   Result Value Ref Range    Lipase 22 13 - 60 IU/L   EKG 12 Lead   Result Value Ref Range    Ventricular Rate 74 BPM    Atrial Rate 74 BPM    P-R Interval 192 ms    QRS Duration 80 ms    Q-T Interval 410 ms    QTc Calculation (Bazett) 455 ms    P Axis 56 degrees    R Axis 20 degrees    T Axis 54 degrees    Diagnosis       Normal sinus rhythm  Possible Left atrial enlargement  Septal infarct , age undetermined  Abnormal ECG  When compared with ECG of 02-FEB-2022 11:43,  No significant change was found     TYPE AND SCREEN   Result Value Ref Range    ABO/Rh O POSITIVE     Antibody Screen NEGATIVE          MDM  Patient presents here for recurrent worsening rectal bleeding with a history of ulcerative colitis. She is currently on Entyvio, last dose was on Wednesday. She is also anticoagulated secondary to paroxysmal atrial flutter. She does note ablation in the past.  She reports feeling better here. Rectal exam shows no blood. Hemoglobin is actually improving from previous. CT abdomen and pelvis reveals mild circumferential wall thickening through the descending colon, sigmoid colon and rectum compatible with colitis. Spoke with Dr. Arlyn Emanuel with GI recommends speaking with cardiology regarding stopping Eliquis, also recommends offering patient the choice of being admitted for urgent sigmoidoscopy versus outpatient follow-up. Patient declines admission. She has a dog at home that she needs to care for. Dr. Arlyn Emanuel recommended 40 mg daily prednisone and follow-up in his office. I also communicated with Dr. Eufemia Jorge with cardiology who is in agreement with stopping Eliquis and outpatient follow-up. Discussed all the above with patient. Recommended returning to ED for any new or worsening symptoms including worsening pain, increased bleeding, intractable vomiting, fevers. Assessment and plan discussed with patient understands and agrees    I have independently evaluated this patient. Final Impression  1. Ulcerative colitis with rectal bleeding, unspecified location ([facility]    2. Anticoagulated        Blood pressure 112/64, pulse 66, temperature 98.6 °F (37 °C), temperature source Oral, resp.  rate 14, height 5' 9\" (1.753 m), weight 216 lb (98 kg), SpO2 92 %. Disposition:  Discharge to home in stable condition. Patient was given scripts for the following medications. I counseled patient how to take these medications. Discharge Medication List as of 4/3/2022  4:27 PM      START taking these medications    Details   predniSONE (DELTASONE) 10 MG tablet Take 4 tablets by mouth daily for 7 days, Disp-28 tablet, R-0Normal             This chart was generated using the [address] [place] [address] [employer] system. I created this record but it may contain dictation errors given the limitations of this technology.        Jennifer Thacker PA-C  04/03/22 4812

## 2022-04-03 NOTE — ED PROVIDER NOTES
EKG  Normal sinus rhythm with rate of 74 bpm, normal intervals. No ST elevation. No previous to compare.       Romaine Mcmahon MD  04/03/22 8140 English

## 2022-04-03 NOTE — ED TRIAGE NOTES
Pt arrives for complaints of rectal bleeding ongoing for the last 20-30 days. History of colitis and just had an IV infusion on Wednesday.  States more weakness than normal. no

## 2022-04-03 NOTE — ED NOTES
Patient discharged by another RN. Discharge papers provided to patient.       Dereck Tyson RN  04/03/22 4978

## 2022-04-04 ENCOUNTER — TELEPHONE (OUTPATIENT)
Dept: CARDIOLOGY CLINIC | Age: 72
End: 2022-04-04

## 2022-04-04 NOTE — TELEPHONE ENCOUNTER
Hold anticogulation  S/p ablation needs follow up w ith dr Davi Rodriguez to decide about long term anticoagulation

## 2022-04-04 NOTE — TELEPHONE ENCOUNTER
Patient called she was in Ed for rectal belled and was taken off of her Eliquis , placed on prednisone , she was advised by ed to have Dr Zachary Camargo call Dr Tyesha Mejia and see what needs done for her care

## 2022-04-04 NOTE — TELEPHONE ENCOUNTER
Again tried to return phone call to patient, please see next telephone encounter. No answer, again left message asking patient to return phone  call when available.

## 2022-04-05 LAB
EKG ATRIAL RATE: 74 BPM
EKG DIAGNOSIS: NORMAL
EKG P AXIS: 56 DEGREES
EKG P-R INTERVAL: 192 MS
EKG Q-T INTERVAL: 410 MS
EKG QRS DURATION: 80 MS
EKG QTC CALCULATION (BAZETT): 455 MS
EKG R AXIS: 20 DEGREES
EKG T AXIS: 54 DEGREES
EKG VENTRICULAR RATE: 74 BPM

## 2022-04-05 PROCEDURE — 93010 ELECTROCARDIOGRAM REPORT: CPT | Performed by: INTERNAL MEDICINE

## 2022-04-05 NOTE — TELEPHONE ENCOUNTER
Spoke with patient who advised is following with Dr Karolina Blackburn regarding rectal bleeding and ulcerative colitis. States Dr Karolina Blackburn just started patient on prednisone. Patient states she is to take this for 4 weeks and will then follow back up with Dr Karolina Blackburn. Patient continues to hold eliquis as directed. Patient reports still having episodes of bloody stools, describes it as bright red with clots at times and dark colored other times. Patient is s/p atrial tachycardia ablation 2/2/22. Explained to patient will forward to Dr Jose Luis Roberson to review and will call patient with any further recommendations once he reviews. Did ask that patient call this office with any further questions or concerns. Patient voiced understanding.      Message sent to Dr Jose Luis Roberson via perfect serve

## 2022-04-05 NOTE — TELEPHONE ENCOUNTER
Messaged received via perfect serve from Dr Jorge Jung advising patient will need to continue to hold anticoagulation if still bleeding and is recommending patient discuss current bleeding with Dr Sita Kenney. Also requesting patient schedule office visit to discuss. Did try to contact patient to discuss scheduled office visit with Dr Jorge Jung. No answer. Did leave message asking patient to return phone call when available.

## 2022-04-06 ENCOUNTER — HOSPITAL ENCOUNTER (OUTPATIENT)
Age: 72
Discharge: HOME | End: 2022-04-06
Payer: MEDICARE

## 2022-04-06 LAB
ALBUMIN SERPL-MCNC: 4.1 GM/DL (ref 3.4–5)
ALP BLD-CCNC: 82 IU/L (ref 40–128)
ALT SERPL-CCNC: 23 U/L (ref 10–40)
ANION GAP SERPL CALCULATED.3IONS-SCNC: 14 MMOL/L (ref 4–16)
AST SERPL-CCNC: 26 IU/L (ref 15–37)
BASOPHILS ABSOLUTE: 0.1 K/CU MM
BASOPHILS RELATIVE PERCENT: 0.4 % (ref 0–1)
BILIRUB SERPL-MCNC: 0.3 MG/DL (ref 0–1)
BUN BLDV-MCNC: 9 MG/DL (ref 6–23)
CALCIUM SERPL-MCNC: 9.2 MG/DL (ref 8.3–10.6)
CHLORIDE BLD-SCNC: 104 MMOL/L (ref 99–110)
CO2: 23 MMOL/L (ref 21–32)
CREAT SERPL-MCNC: 0.9 MG/DL (ref 0.6–1.1)
DIFFERENTIAL TYPE: ABNORMAL
EOSINOPHILS ABSOLUTE: 0 K/CU MM
EOSINOPHILS RELATIVE PERCENT: 0.4 % (ref 0–3)
ERYTHROCYTE SEDIMENTATION RATE: 18 MM/HR (ref 0–30)
GFR AFRICAN AMERICAN: >60 ML/MIN/1.73M2
GFR NON-AFRICAN AMERICAN: >60 ML/MIN/1.73M2
GLUCOSE BLD-MCNC: 122 MG/DL (ref 70–99)
HCT VFR BLD CALC: 40.1 % (ref 37–47)
HEMOGLOBIN: 12.5 GM/DL (ref 12.5–16)
HIGH SENSITIVE C-REACTIVE PROTEIN: 9.6 MG/L
IMMATURE NEUTROPHIL %: 0.9 % (ref 0–0.43)
LYMPHOCYTES ABSOLUTE: 0.6 K/CU MM
LYMPHOCYTES RELATIVE PERCENT: 5.2 % (ref 24–44)
MCH RBC QN AUTO: 29.3 PG (ref 27–31)
MCHC RBC AUTO-ENTMCNC: 31.2 % (ref 32–36)
MCV RBC AUTO: 93.9 FL (ref 78–100)
MONOCYTES ABSOLUTE: 0.2 K/CU MM
MONOCYTES RELATIVE PERCENT: 1.4 % (ref 0–4)
NUCLEATED RBC %: 0 %
PDW BLD-RTO: 13.1 % (ref 11.7–14.9)
PLATELET # BLD: 466 K/CU MM (ref 140–440)
PMV BLD AUTO: 9.5 FL (ref 7.5–11.1)
POTASSIUM SERPL-SCNC: 4.6 MMOL/L (ref 3.5–5.1)
RBC # BLD: 4.27 M/CU MM (ref 4.2–5.4)
SEGMENTED NEUTROPHILS ABSOLUTE COUNT: 10.4 K/CU MM
SEGMENTED NEUTROPHILS RELATIVE PERCENT: 91.7 % (ref 36–66)
SODIUM BLD-SCNC: 141 MMOL/L (ref 135–145)
TOTAL IMMATURE NEUTOROPHIL: 0.1 K/CU MM
TOTAL NUCLEATED RBC: 0 K/CU MM
TOTAL PROTEIN: 6.6 GM/DL (ref 6.4–8.2)
WBC # BLD: 11.4 K/CU MM (ref 4–10.5)

## 2022-04-06 PROCEDURE — 86141 C-REACTIVE PROTEIN HS: CPT

## 2022-04-06 PROCEDURE — 85025 COMPLETE CBC W/AUTO DIFF WBC: CPT

## 2022-04-06 PROCEDURE — 36415 COLL VENOUS BLD VENIPUNCTURE: CPT

## 2022-04-06 PROCEDURE — 80053 COMPREHEN METABOLIC PANEL: CPT

## 2022-04-06 PROCEDURE — 85652 RBC SED RATE AUTOMATED: CPT

## 2022-04-06 NOTE — TELEPHONE ENCOUNTER
Again tried to reach patient. No answer. Again left message asking that patient return phone call when available.

## 2022-04-08 ENCOUNTER — TELEPHONE (OUTPATIENT)
Dept: CARDIOLOGY CLINIC | Age: 72
End: 2022-04-08

## 2022-04-08 ENCOUNTER — OFFICE VISIT (OUTPATIENT)
Dept: CARDIOLOGY CLINIC | Age: 72
End: 2022-04-08
Payer: MEDICARE

## 2022-04-08 ENCOUNTER — HOSPITAL ENCOUNTER (OUTPATIENT)
Age: 72
Setting detail: SPECIMEN
Discharge: HOME | End: 2022-04-08
Payer: MEDICARE

## 2022-04-08 VITALS
SYSTOLIC BLOOD PRESSURE: 118 MMHG | WEIGHT: 219 LBS | HEIGHT: 69 IN | DIASTOLIC BLOOD PRESSURE: 70 MMHG | HEART RATE: 72 BPM | BODY MASS INDEX: 32.44 KG/M2

## 2022-04-08 DIAGNOSIS — K51.919 ULCERATIVE COLITIS WITH COMPLICATION, UNSPECIFIED LOCATION (HCC): ICD-10-CM

## 2022-04-08 DIAGNOSIS — I47.1 ATRIAL TACHYCARDIA (HCC): Primary | ICD-10-CM

## 2022-04-08 PROCEDURE — 1090F PRES/ABSN URINE INCON ASSESS: CPT | Performed by: NURSE PRACTITIONER

## 2022-04-08 PROCEDURE — 99213 OFFICE O/P EST LOW 20 MIN: CPT | Performed by: NURSE PRACTITIONER

## 2022-04-08 PROCEDURE — G8427 DOCREV CUR MEDS BY ELIG CLIN: HCPCS | Performed by: NURSE PRACTITIONER

## 2022-04-08 PROCEDURE — 3017F COLORECTAL CA SCREEN DOC REV: CPT | Performed by: NURSE PRACTITIONER

## 2022-04-08 PROCEDURE — 4040F PNEUMOC VAC/ADMIN/RCVD: CPT | Performed by: NURSE PRACTITIONER

## 2022-04-08 PROCEDURE — 1036F TOBACCO NON-USER: CPT | Performed by: NURSE PRACTITIONER

## 2022-04-08 PROCEDURE — 1123F ACP DISCUSS/DSCN MKR DOCD: CPT | Performed by: NURSE PRACTITIONER

## 2022-04-08 PROCEDURE — G8417 CALC BMI ABV UP PARAM F/U: HCPCS | Performed by: NURSE PRACTITIONER

## 2022-04-08 PROCEDURE — G8400 PT W/DXA NO RESULTS DOC: HCPCS | Performed by: NURSE PRACTITIONER

## 2022-04-08 PROCEDURE — 83993 ASSAY FOR CALPROTECTIN FECAL: CPT

## 2022-04-08 RX ORDER — ASPIRIN 81 MG/1
81 TABLET ORAL DAILY
Qty: 90 TABLET | Refills: 1 | Status: SHIPPED | OUTPATIENT
Start: 2022-04-08

## 2022-04-08 ASSESSMENT — ENCOUNTER SYMPTOMS
COLOR CHANGE: 0
ABDOMINAL PAIN: 0
CONSTIPATION: 0
SINUS PRESSURE: 0
COUGH: 0
DIARRHEA: 0
EYE PAIN: 0
BLOOD IN STOOL: 0
WHEEZING: 0
SINUS PAIN: 0
CHEST TIGHTNESS: 0
ABDOMINAL DISTENTION: 0
BACK PAIN: 0
EYE ITCHING: 0
NAUSEA: 0
SHORTNESS OF BREATH: 0
VOMITING: 0
EYE REDNESS: 0

## 2022-04-08 NOTE — PROGRESS NOTES
Electrophysiology follow-up note      Reason for consultation:  Atrial flutter / ? afib    Chief complaint: Here for follow-up on right-sided atrial tachycardia and to discuss anticoagulation    Referring physician: David Iverson      Primary care physician: Vince Spicer MD      History of Present Illness: This visit 4/8/2022  Patient here today to discuss stopping anticoagulation. Patient reports her anticoagulation was stopped by Dr. William Thomson due to blood in stool. Patient has history of colitis. Patient denies chest pain, palpitations, light headedness, dizziness,  or syncope. Patient does complain of shortness of breath with exertion that will resolve with rest. She does have swelling in her bilateral lower legs. This is unchanged from previous office visit. She does drink alcohol occasionally. Pt does not smoke. Pt does drink 2 diet cokes a day. Pt does not exercise. Patient remains very anxious about home life and her significant others health as he is in the hospital.     Previous visit 3/17/2022  Patient is here today for 1 month follow-up status post ablation of atrial tachycardia. Patient reports that she has been under a lot of stress. She states that she does not have warning water at her house. She states that her significant other had a stroke and has been in the hospital since the last office visit. She states that she has been very anxious. She denies chest pain, palpitations, shortness of breath, lightheadedness, dizziness, edema or syncope. Previous visit 2/8/2022  Patient is here today for 1 week follow-up status post ablation of atrial tachycardia. She denies chest pain, palpitations, shortness of breath, lightheadedness, dizziness, edema or syncope. Patient is a 80-year-old female with history of colitis referred by Dr. Wesley Galvez for atrial flutter.   Patient reports she she states she has chest pressure and feels shortness of breath most of the time. She also has dizziness upon standing. She states that her heart races at any time and this causes symptoms mentioned above. And usually the symptoms last for a few minutes. Patient has swelling in the ankles and feet after a long day but goes away in the morning when she sleeps. Patient drinks cup of coffee in the morning. Patient drinks Diet Coke daily        Pastmedical history:   Past Medical History:   Diagnosis Date    Abnormal EKG     Bradycardia     Chronic colitis 01/20/2022    Edema leg     Family history of coronary artery disease     Former smoker     H/O percutaneous left heart catheterization 02/25/2022    Normal coronaries    History of cardiac monitoring 10/18/2019    48 hr monitor, Short runs of SVT, few PAC and PVC noted, no significant arrhythmias, no significant pauses.  Hx of cardiovascular stress test 10/17/2019    Fair Exercise Tolerance. No chest pain noted during testing.  Hx of Doppler echocardiogram 10/17/2019    Normal left ventricle structure and systolic function with an ejection fraction of 55-60%. Grade I diastolic dysfunction. Sclerotic, but non-stenotic aortic valve. Mild aortic regurgitation with a pressure half time of 823. Normal pulmonary artery pressure. No evidence of pericardial effusion.     Hyperlipidemia     Paroxysmal atrial flutter ([address] 12/22/2021    Exercise induced 12/22/2021 stress test    Pre-op evaluation 10/2019    PVC's (premature ventricular contractions)     SOB (shortness of breath)     Ulcerative colitis with complication ([address] 26/62/1753    Ulcerative colitis, acute, without complications ([address] 97/62/5077       Surgical history :   Past Surgical History:   Procedure Laterality Date    ABLATION OF DYSRHYTHMIC FOCUS  02/02/2022    atrial tachycardia ablation    COLOSTOMY N/A 05/09/2019    BOWEL RESECTION COLOSTOMY HARTMANS PROCEDURE INCIDENTAL APPENDECTOMY MULTIPLE SMALL BOWEL SEROSAL REPAIR performed by Ender Henson MD at [facility]         Family history:   Family History   Problem Relation Age of Onset    Dementia Mother     Cancer Father     Lung Cancer Sister     Colon Cancer Sister        Social history :  reports that she has quit smoking. She has never used smokeless tobacco. She reports current alcohol use. She reports that she does not use drugs. No Known Allergies    Current Outpatient Medications on File Prior to Visit   Medication Sig Dispense Refill    predniSONE (DELTASONE) 10 MG tablet Take 4 tablets by mouth daily for 7 days 28 tablet 0    magnesium oxide (MAG-OX) 400 MG tablet Take 1 tablet by mouth daily 30 tablet 1    metoprolol tartrate (LOPRESSOR) 50 MG tablet Take 1 tablet by mouth 2 times daily 60 tablet 2    melatonin 3 MG TABS tablet Take 20 mg by mouth daily       lansoprazole (PREVACID) 30 MG delayed release capsule TAKE 1 CAPSULE BY MOUTH ONCE DAILY BEFORE A MEAL      ferrous sulfate (IRON 325) 325 (65 Fe) MG tablet Take 325 mg by mouth 3 times daily (with meals)      Cholecalciferol (VITAMIN D3) 1.25 MG (80341 UT) TABS Take by mouth      gabapentin (NEURONTIN) 300 MG capsule Take 300 mg by mouth.  Handicap Placard MISC by Does not apply route 1 each 0    atorvastatin (LIPITOR) 10 MG tablet Take 10 mg by mouth daily Pt states she's taking 3x wk       No current facility-administered medications on file prior to visit. Review of Systems:   Review of Systems   Constitutional: Negative for activity change, chills, fatigue and fever. HENT: Negative for congestion, ear pain, sinus pressure and sinus pain. Eyes: Negative for pain, redness, itching and visual disturbance. Respiratory: Negative for cough, chest tightness, shortness of breath and wheezing. Cardiovascular: Negative for chest pain, palpitations and leg swelling.    Gastrointestinal: Negative for abdominal distention, abdominal pain, blood in stool, constipation, diarrhea, nausea and vomiting. Endocrine: Negative for cold intolerance and heat intolerance. Genitourinary: Negative for difficulty urinating, dysuria and hematuria. Musculoskeletal: Positive for arthralgias. Negative for back pain, myalgias and neck stiffness. Skin: Negative for color change, pallor, rash and wound. Allergic/Immunologic: Negative for food allergies. Neurological: Negative for dizziness, syncope, light-headedness, numbness and headaches. Hematological: Does not bruise/bleed easily. Psychiatric/Behavioral: Negative for agitation, behavioral problems and confusion. The patient is not nervous/anxious. Examination:      Vitals:    04/08/22 1130   BP: 118/70   Pulse: 72   Weight: 219 lb (99.3 kg)   Height: 5' 9\" (1.753 m)        Body mass index is 32.34 kg/m². Physical Exam  Constitutional:       Appearance: Normal appearance. HENT:      Head: Normocephalic and atraumatic. Right Ear: External ear normal.      Left Ear: External ear normal.      Nose: No congestion or rhinorrhea. Mouth/Throat:      Mouth: Mucous membranes are moist.      Pharynx: Oropharynx is clear. Eyes:      General:         Right eye: No discharge. Left eye: No discharge. Conjunctiva/sclera: Conjunctivae normal.   Cardiovascular:      Rate and Rhythm: Normal rate and regular rhythm. Pulses: Normal pulses. Heart sounds: Normal heart sounds. No murmur heard. Pulmonary:      Effort: Pulmonary effort is normal.      Breath sounds: Normal breath sounds. No wheezing or rhonchi. Abdominal:      General: Abdomen is flat. Palpations: Abdomen is soft. Musculoskeletal:         General: Normal range of motion. Cervical back: Normal range of motion. Right lower leg: No edema. Left lower leg: No edema. Skin:     General: Skin is warm and dry. Neurological:      General: No focal deficit present.       Mental Status: She is alert and oriented to person, place, and time. Psychiatric:         Mood and Affect: Mood normal.         Thought Content: Thought content normal.       CBC:   Lab Results   Component Value Date    WBC 11.4 04/06/2022    HGB 12.5 04/06/2022    HCT 40.1 04/06/2022     04/06/2022     Lipids:  Lab Results   Component Value Date    CHOL 131 12/02/2021    TRIG 87 12/02/2021    HDL 45 12/02/2021    LDLCALC 69 12/02/2021     PT/INR:   Lab Results   Component Value Date    INR 1.46 04/03/2022        BMP:    Lab Results   Component Value Date     04/06/2022    K 4.6 04/06/2022     04/06/2022    CO2 23 04/06/2022    BUN 9 04/06/2022     CMP:   Lab Results   Component Value Date    AST 26 04/06/2022    PROT 6.6 04/06/2022    BILITOT 0.3 04/06/2022    ALKPHOS 82 04/06/2022     TSH:    EKG Interpretation:  SINUS RHYTHM       IMPRESSION / RECOMMENDATIONS:     Status post ablation of atrial tachycardia  History of diverticulitis status post surgery colostomy now she has colitis  History of GI bleed   Obesity BMI 32    Patient denies palpitations or tachycardia. EKG reviewed from previous recent visit. Noted to be in sinus rhythm. She reports that she has blood in her stool. GI stopped anticoagulation. Patient is at risk of further arrythmias as she had right sided atrial tachycardia  Would recommend ASA 81 mg daily if ok with GI  We will call Dr Filipe Pineda to see if ok for patient to be on ASA 81 mg   We will continue Lopressor 50 mg twice daily     Continue magnesium 400 mg daily- no PVC on EKG    Patient to follow up as scheduled    *Received follow-up call GI. Patient is okay to start 81 mg of aspirin daily  We will call patient and let her know that prescription was sent to pharmacy    Thanks again for allowing me to participate in care of this patient. Please call me if you have any questions. With best regards.       FABIEN Carbone CNP, 4/8/2022 12:08 PM     Please note this report has been partially produced using speech recognition software and may contain errors related to that system including errors in grammar, punctuation, and spelling, as well as words and phrases that may be inappropriate. If there are any questions or concerns please feel free to contact the dictating provider for clarification.

## 2022-04-08 NOTE — TELEPHONE ENCOUNTER
Pt answered, called to let know that  states pt is fine to take 81mg of aspirin. Ashleigh Meyer sent medication to pharmacy.   [address] [facility]

## 2022-04-11 LAB — CALPROTECTIN, FECAL: >3000 UG/G

## 2022-04-13 ENCOUNTER — TELEPHONE (OUTPATIENT)
Dept: NEUROLOGY | Age: 72
End: 2022-04-13

## 2022-04-13 NOTE — TELEPHONE ENCOUNTER
Patient called office and left a vm that she need to make an apt to get her vit D. Increased. TTC patient back to discuss, no vm box set up.

## 2022-04-20 ENCOUNTER — TELEPHONE (OUTPATIENT)
Dept: CARDIOLOGY CLINIC | Age: 72
End: 2022-04-20

## 2022-04-20 NOTE — TELEPHONE ENCOUNTER
Received document from [address] [place] [address] that patient is needing to follow up with Ayo Avendaño. Contacted patient whom states she told [facility] that she will not be coming back. She stated the only thing she needs is her vitamin D.  I instructed her that she can obtain this from her PCP if she will not be returning to our location for care.

## 2022-04-20 NOTE — TELEPHONE ENCOUNTER
Patient called she has finished her prednisone and is asking when  She should resume her Eliquis asking if you can call this morning she will be in court this afternoon

## 2022-04-20 NOTE — TELEPHONE ENCOUNTER
Tried to return phone call to patient. No answer and unable to leave a message as mailbox not set up. Did discuss with Rosa Feliciano CNP, and patient will need to reach out to gastroenterology physician, Dr Bee Olmos, to be advised when able to resume Eliquis. Will try patient again at a later time.

## 2022-04-21 NOTE — TELEPHONE ENCOUNTER
Spoke with patient and patient is aware of previous and states she will reach out to Dr William Thomson regarding restarting eliquis. Did ask that patient call this office with any further questions or concerns. Patient voiced understanding.

## 2022-04-24 PROBLEM — K51.511 LEFT SIDED COLITIS WITH RECTAL BLEEDING (HCC): Status: ACTIVE | Noted: 2022-04-24

## 2022-04-29 PROBLEM — K51.211 ULCERATIVE PROCTITIS WITH RECTAL BLEEDING (HCC): Status: ACTIVE | Noted: 2022-04-29

## 2022-05-09 NOTE — ASSESSMENT & PLAN NOTE
Status post bilateral venous ablation. I asked her to stop taking diuretic as it is not helping her swelling and check her electrolytes and magnesium as she had a lot of diarrhea in the recent days. 103

## 2022-05-13 ENCOUNTER — OFFICE VISIT (OUTPATIENT)
Dept: CARDIOLOGY CLINIC | Age: 72
End: 2022-05-13
Payer: MEDICARE

## 2022-05-13 VITALS
DIASTOLIC BLOOD PRESSURE: 78 MMHG | SYSTOLIC BLOOD PRESSURE: 132 MMHG | HEIGHT: 69 IN | BODY MASS INDEX: 30.36 KG/M2 | WEIGHT: 205 LBS

## 2022-05-13 DIAGNOSIS — I47.1 ATRIAL TACHYCARDIA (HCC): Primary | ICD-10-CM

## 2022-05-13 PROCEDURE — 1090F PRES/ABSN URINE INCON ASSESS: CPT | Performed by: NURSE PRACTITIONER

## 2022-05-13 PROCEDURE — G8400 PT W/DXA NO RESULTS DOC: HCPCS | Performed by: NURSE PRACTITIONER

## 2022-05-13 PROCEDURE — G8427 DOCREV CUR MEDS BY ELIG CLIN: HCPCS | Performed by: NURSE PRACTITIONER

## 2022-05-13 PROCEDURE — 1123F ACP DISCUSS/DSCN MKR DOCD: CPT | Performed by: NURSE PRACTITIONER

## 2022-05-13 PROCEDURE — 4040F PNEUMOC VAC/ADMIN/RCVD: CPT | Performed by: NURSE PRACTITIONER

## 2022-05-13 PROCEDURE — 99213 OFFICE O/P EST LOW 20 MIN: CPT | Performed by: NURSE PRACTITIONER

## 2022-05-13 PROCEDURE — 3017F COLORECTAL CA SCREEN DOC REV: CPT | Performed by: NURSE PRACTITIONER

## 2022-05-13 PROCEDURE — G8417 CALC BMI ABV UP PARAM F/U: HCPCS | Performed by: NURSE PRACTITIONER

## 2022-05-13 PROCEDURE — 1036F TOBACCO NON-USER: CPT | Performed by: NURSE PRACTITIONER

## 2022-05-13 ASSESSMENT — ENCOUNTER SYMPTOMS
EYE PAIN: 0
SINUS PRESSURE: 0
ABDOMINAL DISTENTION: 0
EYE REDNESS: 0
EYE ITCHING: 0
CHEST TIGHTNESS: 0
BACK PAIN: 0
BLOOD IN STOOL: 0
WHEEZING: 0
CONSTIPATION: 0
ABDOMINAL PAIN: 0
COUGH: 0
NAUSEA: 0
COLOR CHANGE: 0
SINUS PAIN: 0
SHORTNESS OF BREATH: 0
VOMITING: 0
DIARRHEA: 0

## 2022-05-13 NOTE — PROGRESS NOTES
Electrophysiology follow-up note      Reason for consultation:  Atrial flutter / ? afib    Chief complaint: Here for follow-up on right-sided atrial tachycardia and to discuss anticoagulation    Referring physician: Shell Lopez      Primary care physician: Abram Connor MD      History of Present Illness: This visit 5/13/2022  Patient here today for follow-up on atrial tachycardia. Patient reports she is feeling well. She denies chest pain, palpitations, shortness of breath, lightheadedness, dizziness, edema or syncope. Patient is taking aspirin 81 mg daily. She denies any issues with bleeding. She does report that her colitis is not controlled and she has a follow-up at [facility]. She recently lost her significant other and may be moving closer to family either in [place] or [place]. Previous visit 4/8/2022  Patient here today to discuss stopping anticoagulation. Patient reports her anticoagulation was stopped by Dr. Vijay Rowland due to blood in stool. Patient has history of colitis. Patient denies chest pain, palpitations, light headedness, dizziness,  or syncope. Patient does complain of shortness of breath with exertion that will resolve with rest. She does have swelling in her bilateral lower legs. This is unchanged from previous office visit. She does drink alcohol occasionally. Pt does not smoke. Pt does drink 2 diet cokes a day. Pt does not exercise. Patient remains very anxious about home life and her significant others health as he is in the hospital.     Previous visit 3/17/2022  Patient is here today for 1 month follow-up status post ablation of atrial tachycardia. Patient reports that she has been under a lot of stress. She states that she does not have warning water at her house. She states that her significant other had a stroke and has been in the hospital since the last office visit. She states that she has been very anxious.   She denies chest pain, palpitations, shortness of breath, lightheadedness, dizziness, edema or syncope. Previous visit 2/8/2022  Patient is here today for 1 week follow-up status post ablation of atrial tachycardia. She denies chest pain, palpitations, shortness of breath, lightheadedness, dizziness, edema or syncope. Patient is a 71-year-old female with history of colitis referred by Dr. Shalini Lazaro for atrial flutter. Patient reports she she states she has chest pressure and feels shortness of breath most of the time. She also has dizziness upon standing. She states that her heart races at any time and this causes symptoms mentioned above. And usually the symptoms last for a few minutes. Patient has swelling in the ankles and feet after a long day but goes away in the morning when she sleeps. Patient drinks cup of coffee in the morning. Patient drinks Diet Coke daily        Pastmedical history:   Past Medical History:   Diagnosis Date    Abnormal EKG     Bradycardia     Chronic colitis 01/20/2022    Edema leg     Family history of coronary artery disease     Former smoker     H/O percutaneous left heart catheterization 02/25/2022    Normal coronaries    History of cardiac monitoring 10/18/2019    48 hr monitor, Short runs of SVT, few PAC and PVC noted, no significant arrhythmias, no significant pauses.  Hx of cardiovascular stress test 10/17/2019    Fair Exercise Tolerance. No chest pain noted during testing.  Hx of Doppler echocardiogram 10/17/2019    Normal left ventricle structure and systolic function with an ejection fraction of 55-60%. Grade I diastolic dysfunction. Sclerotic, but non-stenotic aortic valve. Mild aortic regurgitation with a pressure half time of 823. Normal pulmonary artery pressure. No evidence of pericardial effusion.     Hyperlipidemia     Paroxysmal atrial flutter ([address] 12/22/2021    Exercise induced 12/22/2021 stress test    Pre-op evaluation 10/2019    PVC's (premature ventricular contractions)     SOB (shortness of breath)     [facility] 88/57/5537    Ulcerative colitis, acute, without complications ([facility] 59/36/9004       Surgical history :   Past Surgical History:   Procedure Laterality Date    ABLATION OF DYSRHYTHMIC FOCUS  02/02/2022    atrial tachycardia ablation    COLOSTOMY N/A 05/09/2019    BOWEL RESECTION COLOSTOMY HARTMANS PROCEDURE INCIDENTAL APPENDECTOMY MULTIPLE SMALL BOWEL SEROSAL REPAIR performed by Nasir Manning MD at [facility]         Family history:   Family History   Problem Relation Age of Onset    Dementia Mother     Cancer Father     Lung Cancer Sister     Colon Cancer Sister        Social history :  reports that she has quit smoking. She has a 0.75 pack-year smoking history. She has never used smokeless tobacco. She reports current alcohol use of about 1.0 standard drink of alcohol per week. She reports that she does not use drugs. No Known Allergies    Current Outpatient Medications on File Prior to Visit   Medication Sig Dispense Refill    dicyclomine (BENTYL) 20 MG tablet TAKE 1 TABLET BY MOUTH 4 TIMES DAILY AS NEEDED      aspirin EC 81 MG EC tablet Take 1 tablet by mouth daily 90 tablet 1    magnesium oxide (MAG-OX) 400 MG tablet Take 1 tablet by mouth daily 30 tablet 1    metoprolol tartrate (LOPRESSOR) 50 MG tablet Take 1 tablet by mouth 2 times daily 60 tablet 2    melatonin 3 MG TABS tablet Take 20 mg by mouth daily       lansoprazole (PREVACID) 30 MG delayed release capsule TAKE 1 CAPSULE BY MOUTH ONCE DAILY BEFORE A MEAL      ferrous sulfate (IRON 325) 325 (65 Fe) MG tablet Take 325 mg by mouth 3 times daily (with meals)      Cholecalciferol (VITAMIN D3) 1.25 MG (38869 UT) TABS Take by mouth      gabapentin (NEURONTIN) 300 MG capsule Take 300 mg by mouth.       Handicap Placard MISC by Does not apply route 1 each 0    atorvastatin (LIPITOR) 10 MG tablet Take 10 mg by mouth daily Pt states she's taking 3x wk       No current facility-administered medications on file prior to visit. Review of Systems:   Review of Systems   Constitutional: Negative for activity change, chills, fatigue and fever. HENT: Negative for congestion, ear pain, sinus pressure and sinus pain. Eyes: Negative for pain, redness, itching and visual disturbance. Respiratory: Negative for cough, chest tightness, shortness of breath and wheezing. Cardiovascular: Negative for chest pain, palpitations and leg swelling. Gastrointestinal: Negative for abdominal distention, abdominal pain, blood in stool, constipation, diarrhea, nausea and vomiting. Endocrine: Negative for cold intolerance and heat intolerance. Genitourinary: Negative for difficulty urinating, dysuria and hematuria. Musculoskeletal: Positive for arthralgias. Negative for back pain, myalgias and neck stiffness. Skin: Negative for color change, pallor, rash and wound. Allergic/Immunologic: Negative for food allergies. Neurological: Negative for dizziness, syncope, light-headedness, numbness and headaches. Hematological: Does not bruise/bleed easily. Psychiatric/Behavioral: Negative for agitation, behavioral problems and confusion. The patient is not nervous/anxious. Examination:      Vitals:    05/13/22 1032   BP: 132/78   Site: Left Upper Arm   Position: Sitting   Cuff Size: Large Adult   Weight: 205 lb (93 kg)   Height: 5' 9\" (1.753 m)        Body mass index is 30.27 kg/m². Physical Exam  Constitutional:       Appearance: Normal appearance. HENT:      Head: Normocephalic and atraumatic. Right Ear: External ear normal.      Left Ear: External ear normal.      Nose: No congestion or rhinorrhea. Mouth/Throat:      Mouth: Mucous membranes are moist.      Pharynx: Oropharynx is clear. Eyes:      General:         Right eye: No discharge. Left eye: No discharge.       Conjunctiva/sclera: Conjunctivae normal.   Cardiovascular:      Rate and Rhythm: Normal rate and regular rhythm. Pulses: Normal pulses. Heart sounds: Normal heart sounds. No murmur heard. Pulmonary:      Effort: Pulmonary effort is normal.      Breath sounds: Normal breath sounds. No wheezing or rhonchi. Abdominal:      General: Abdomen is flat. Palpations: Abdomen is soft. Musculoskeletal:         General: Normal range of motion. Cervical back: Normal range of motion. Right lower leg: No edema. Left lower leg: No edema. Skin:     General: Skin is warm and dry. Neurological:      General: No focal deficit present. Mental Status: She is alert and oriented to person, place, and time. Psychiatric:         Mood and Affect: Mood normal.         Thought Content: Thought content normal.       CBC:   Lab Results   Component Value Date    WBC 11.4 04/06/2022    HGB 12.5 04/06/2022    HCT 40.1 04/06/2022     04/06/2022     Lipids:  Lab Results   Component Value Date    CHOL 131 12/02/2021    TRIG 87 12/02/2021    HDL 45 12/02/2021    LDLCALC 69 12/02/2021     PT/INR:   Lab Results   Component Value Date    INR 1.46 04/03/2022        BMP:    Lab Results   Component Value Date     04/06/2022    K 4.6 04/06/2022     04/06/2022    CO2 23 04/06/2022    BUN 9 04/06/2022     CMP:   Lab Results   Component Value Date    AST 26 04/06/2022    PROT 6.6 04/06/2022    BILITOT 0.3 04/06/2022    ALKPHOS 82 04/06/2022     TSH:    EKG Interpretation:  SINUS RHYTHM       IMPRESSION / RECOMMENDATIONS:     Status post ablation of atrial tachycardia  History of diverticulitis status post surgery colostomy now she has colitis  History of GI bleed   Obesity BMI 32    Patient denies palpitations or tachycardia.    Patient appears to be tolerating aspirin 81 mg daily  We will continue Lopressor 50 mg twice daily     Continue magnesium 400 mg daily- no PVC on EKG    Patient to follow-up in 3 months      Thanks again for allowing me to participate in care of this patient. Please call me if you have any questions. With best regards. FABIEN Hendrix CNP, 5/13/2022 10:56 AM     Please note this report has been partially produced using speech recognition software and may contain errors related to that system including errors in grammar, punctuation, and spelling, as well as words and phrases that may be inappropriate. If there are any questions or concerns please feel free to contact the dictating provider for clarification.

## 2022-05-25 ENCOUNTER — HOSPITAL ENCOUNTER (OUTPATIENT)
Dept: INFUSION THERAPY | Age: 72
Setting detail: INFUSION SERIES
End: 2022-05-25

## 2022-06-10 ENCOUNTER — OFFICE VISIT (OUTPATIENT)
Dept: CARDIOLOGY CLINIC | Age: 72
End: 2022-06-10
Payer: MEDICARE

## 2022-06-10 VITALS
DIASTOLIC BLOOD PRESSURE: 76 MMHG | SYSTOLIC BLOOD PRESSURE: 122 MMHG | WEIGHT: 205 LBS | HEIGHT: 69 IN | HEART RATE: 68 BPM | BODY MASS INDEX: 30.36 KG/M2

## 2022-06-10 DIAGNOSIS — I48.92 PAROXYSMAL ATRIAL FLUTTER (HCC): ICD-10-CM

## 2022-06-10 DIAGNOSIS — R06.02 SOB (SHORTNESS OF BREATH): ICD-10-CM

## 2022-06-10 DIAGNOSIS — K51.919 ULCERATIVE COLITIS WITH COMPLICATION, UNSPECIFIED LOCATION (HCC): ICD-10-CM

## 2022-06-10 DIAGNOSIS — K52.9 CHRONIC COLITIS: ICD-10-CM

## 2022-06-10 DIAGNOSIS — R07.2 PRECORDIAL PAIN: ICD-10-CM

## 2022-06-10 DIAGNOSIS — I49.3 PVC (PREMATURE VENTRICULAR CONTRACTION): ICD-10-CM

## 2022-06-10 DIAGNOSIS — K51.90 ULCERATIVE COLITIS, ACUTE, WITHOUT COMPLICATIONS (HCC): ICD-10-CM

## 2022-06-10 DIAGNOSIS — K51.211 ULCERATIVE PROCTITIS WITH RECTAL BLEEDING (HCC): ICD-10-CM

## 2022-06-10 DIAGNOSIS — I47.1 ATRIAL TACHYCARDIA (HCC): Primary | ICD-10-CM

## 2022-06-10 DIAGNOSIS — R00.1 BRADYCARDIA: ICD-10-CM

## 2022-06-10 PROCEDURE — 1123F ACP DISCUSS/DSCN MKR DOCD: CPT | Performed by: INTERNAL MEDICINE

## 2022-06-10 PROCEDURE — G8400 PT W/DXA NO RESULTS DOC: HCPCS | Performed by: INTERNAL MEDICINE

## 2022-06-10 PROCEDURE — 3017F COLORECTAL CA SCREEN DOC REV: CPT | Performed by: INTERNAL MEDICINE

## 2022-06-10 PROCEDURE — 99214 OFFICE O/P EST MOD 30 MIN: CPT | Performed by: INTERNAL MEDICINE

## 2022-06-10 PROCEDURE — G8417 CALC BMI ABV UP PARAM F/U: HCPCS | Performed by: INTERNAL MEDICINE

## 2022-06-10 PROCEDURE — 1036F TOBACCO NON-USER: CPT | Performed by: INTERNAL MEDICINE

## 2022-06-10 PROCEDURE — 1090F PRES/ABSN URINE INCON ASSESS: CPT | Performed by: INTERNAL MEDICINE

## 2022-06-10 PROCEDURE — G8428 CUR MEDS NOT DOCUMENT: HCPCS | Performed by: INTERNAL MEDICINE

## 2022-06-10 NOTE — PROGRESS NOTES
CARDIOLOGY NOTE    Chief Complaint: SOB      HPI:   Po Jose is a 71y.o. year old who has Past medical history as noted below. She has colitis and having a lot of diarrhea, She is struggling with RLS and Colitis making her life very difficult. She has had colitis for almost 1 year now ,Sees Dr Tomer Cortes . She failed  prednisone , she has gained weight , she has been having palpiations heart thumping but metoprolol is helping , symptoms are  mostly at night and sometimes at rest,  \"feels like electrical shock\",   She had cardiac cath in Feb 202 showing no significant CAD  She had atrial tachycardia ablation in Feb by [facility] , she was taken off anticoagulation due to colitis but she is on aspirin     She gets dizzy and feels unbalanced when walking . Dr Jarrod Brown did colostomy reveresed and also did leg venous ablation . She has put on 25 lbs since her last visit   She is having a lot of tremors and jerking of legs , she saw  neurology . She is exhausted by just doing laundry and walking    She does report shortness of breath she does not walk much due to shortness of breath she is a strong family history of early coronary artery disease. As such she denies any chest pain but she does not do much.       Current Outpatient Medications   Medication Sig Dispense Refill    dicyclomine (BENTYL) 20 MG tablet TAKE 1 TABLET BY MOUTH 4 TIMES DAILY AS NEEDED      aspirin EC 81 MG EC tablet Take 1 tablet by mouth daily 90 tablet 1    magnesium oxide (MAG-OX) 400 MG tablet Take 1 tablet by mouth daily 30 tablet 1    metoprolol tartrate (LOPRESSOR) 50 MG tablet Take 1 tablet by mouth 2 times daily 60 tablet 2    melatonin 3 MG TABS tablet Take 20 mg by mouth daily       lansoprazole (PREVACID) 30 MG delayed release capsule TAKE 1 CAPSULE BY MOUTH ONCE DAILY BEFORE A MEAL      ferrous sulfate (IRON 325) 325 (65 Fe) MG tablet Take 325 mg by mouth 3 times daily (with meals)      Cholecalciferol (VITAMIN D3) 1.25 MG (56593 UT) TABS Take by mouth      gabapentin (NEURONTIN) 300 MG capsule Take 300 mg by mouth.  Handicap Placard MISC by Does not apply route 1 each 0    atorvastatin (LIPITOR) 10 MG tablet Take 10 mg by mouth daily Pt states she's taking 3x wk       No current facility-administered medications for this visit. Allergies:   Patient has no known allergies. Patient History:  Past Medical History:   Diagnosis Date    Abnormal EKG     Bradycardia     Chronic colitis 01/20/2022    Edema leg     Family history of coronary artery disease     Former smoker     H/O percutaneous left heart catheterization 02/25/2022    Normal coronaries    History of cardiac monitoring 10/18/2019    48 hr monitor, Short runs of SVT, few PAC and PVC noted, no significant arrhythmias, no significant pauses.  Hx of cardiovascular stress test 10/17/2019    Fair Exercise Tolerance. No chest pain noted during testing.  Hx of Doppler echocardiogram 10/17/2019    Normal left ventricle structure and systolic function with an ejection fraction of 55-60%. Grade I diastolic dysfunction. Sclerotic, but non-stenotic aortic valve. Mild aortic regurgitation with a pressure half time of 823. Normal pulmonary artery pressure. No evidence of pericardial effusion.     Hyperlipidemia     Paroxysmal atrial flutter ([address] 12/22/2021    Exercise induced 12/22/2021 stress test    Pre-op evaluation 10/2019    PVC's (premature ventricular contractions)     SOB (shortness of breath)     Ulcerative colitis with complication ([address] 70/44/0394    Ulcerative colitis, acute, without complications ([address] 09/99/4384     Past Surgical History:   Procedure Laterality Date    ABLATION OF DYSRHYTHMIC FOCUS  02/02/2022    atrial tachycardia ablation    COLOSTOMY N/A 05/09/2019    BOWEL RESECTION COLOSTOMY HARTMANS PROCEDURE INCIDENTAL APPENDECTOMY MULTIPLE SMALL BOWEL SEROSAL REPAIR performed by Raphael Andujar MD at [facility]  HEMORRHOIDECTOMY      LAPAROTOMY       Family History   Problem Relation Age of Onset    Dementia Mother     Cancer Father     Lung Cancer Sister     Colon Cancer Sister      Social History     Tobacco Use    Smoking status: Former Smoker     Packs/day: 0.25     Years: 3.00     Pack years: 0.75    Smokeless tobacco: Never Used   Substance Use Topics    Alcohol use: Yes     Alcohol/week: 1.0 standard drink     Types: 1 Cans of beer per week     Comment: once a week        Review of Systems:   · Constitutional: No Fever or Weight Loss   · Eyes: No Decreased Vision  · ENT: No Headaches, Hearing Loss or Vertigo  · Cardiovascular: as per note above   · Respiratory: No cough or wheezing and as per note above. · Gastrointestinal: No abdominal pain, appetite loss, blood in stools, constipation, diarrhea or heartburn  · Genitourinary: No dysuria, trouble voiding, or hematuria  · Musculoskeletal:  denies any new  joint aches , swelling  or pain   · Integumentary: No rash or pruritis  · Neurological: No TIA or stroke symptoms  · Psychiatric: No anxiety or depression  · Endocrine: No malaise, fatigue or temperature intolerance  · Hematologic/Lymphatic: No bleeding problems, blood clots or swollen lymph nodes  · Allergic/Immunologic: No nasal congestion or hives    Objective:      Physical Exam:  /76   Pulse 68   Ht 5' 9\" (1.753 m)   Wt 205 lb (93 kg)   BMI 30.27 kg/m²   Wt Readings from Last 3 Encounters:   06/10/22 205 lb (93 kg)   05/13/22 205 lb (93 kg)   04/27/22 210 lb (95.3 kg)     Body mass index is 30.27 kg/m². Vitals:    06/10/22 1126   BP: 122/76   Pulse: 68        General Appearance:  No distress, conversant  Constitutional:  Well developed, Well nourished, No acute distress, Non-toxic appearance.    HENT:  Normocephalic, Atraumatic, Bilateral external ears normal, Oropharynx moist, No oral exudates, Nose normal. Neck- Normal range of motion, No tenderness, Supple, No stridor,no apical-carotid delay  Eyes:  PERRL, EOMI, Conjunctiva normal, No discharge. Respiratory:  Normal breath sounds, No respiratory distress, No wheezing, No chest tenderness. ,no use of accessory muscles, NO crackles  Cardiovascular: (PMI) apex non displaced,no lifts no thrills,S1 and S2 audible, No added heart sounds, 1+ ankle edema, or volume overload, No evidence of JVD, No crackles  GI: There is post colostomy bowel sounds normal, Soft, No tenderness, No masses, No gross visceromegaly   :  No costovertebral angle tenderness   Musculoskeletal: 1+ edema, no tenderness, no deformities. Back- no tenderness  Integument:  Well hydrated, no rash   Lymphatic:  No lymphadenopathy noted   Neurologic:  Alert & oriented x 3, CN 2-12 normal, normal motor function, normal sensory function, no focal deficits noted   Psychiatric:  Speech and behavior appropriate       Medical decision making and Data review:  DATA:  No results found for: TROPONINT  BNP:  No results found for: PROBNP  PT/INR:  No results found for: PTINR  No results found for: LABA1C  Lab Results   Component Value Date    CHOL 131 12/02/2021    TRIG 87 12/02/2021    HDL 45 12/02/2021    LDLCALC 69 12/02/2021     Lab Results   Component Value Date    ALT 23 04/06/2022    AST 26 04/06/2022     No results for input(s): WBC, HGB, HCT, MCV, PLT in the last 72 hours.   TSH: No results found for: TSH  Lab Results   Component Value Date    AST 26 04/06/2022    ALT 23 04/06/2022    BILITOT 0.3 04/06/2022    ALKPHOS 82 04/06/2022     No results found for: PROBNP  No results found for: LABA1C  Lab Results   Component Value Date    WBC 11.4 (H) 04/06/2022    HGB 12.5 04/06/2022    HCT 40.1 04/06/2022     (H) 04/06/2022     Stress test  10/17/19   Stress Type: Exercise      Peak HR: 131 bpm                       HR Response: Appropriate   Peak BP: 158/80 mmHg                   BP Response: Normal resting BP -   Predicted HR: 152 bpm                  appropriate response   % of predicted HR: 86                  HR BP Product: 35004   Exercise Duration: 04:00 min           Max Exercise: 5.8 METS   Reason for Termination: Target heart   rate                                   Exercise Effort: Fair     Echo 10/17/19   Summary   Normal left ventricle structure and systolic function with an ejection   fraction of 55-60%. Grade I diastolic dysfunction. Sclerotic, but non-stenotic aortic valve. Mild aortic regurgitation with a pressure half time of 823. Normal pulmonary artery pressure. No evidence of pericardial effusion. Cath Feb 2022      Procedure Summary   Access : radial Indication : abnormal stress test /coronary   calcification on Cardiac cta   1. LAD and Circ are widely patent   2. RCA is widely patent   3. LVEDP was 10 mmHG   LMCA: Normal and No Angiographicalyl Significant Disease. widely patent     LAD: Normal (no stenosis %) and No Angiographicalyl Significant Disease. Widely  patent , 3 small diag which are patent     LCx: Normal (no stenosis %) and No Angiographicalyl Significant Disease. Circ  is patent , OM are patent     RCA: Normal (no stenosis %) and No Angiographicalyl Significant Disease. RCA is  widely patent , PDA and PLV are widely patent      Event monitor Jan 2021  30-day monitor worn from 12/17/2021 to 1/15/2021 with 14 patient triggers and 17 triggers.  Lowest heart rate was 58 at 4:46 AM average heart rate was 83 highest heart rate was 163.  Patient noted to have runs of atrial flutter and wide-complex tachycardia of six beats possible V. tach rapid heart rate 146 which is possible aberrant conduction episode of atrial flutter with a heart rate of 143 and episodes of SVT with a heart rate of 160 abnormal 30-day monitor consider EPS evaluation          All labs, medications and tests reviewed by myself including data and history from outside source , patient and available family . Assessment & Plan:      1. Atrial tachycardia ([address]    2. Bradycardia    3. Chronic colitis    4. Paroxysmal atrial flutter (HCC)    5. Precordial pain    6. PVC (premature ventricular contraction)    7. SOB (shortness of breath)    8. Ulcerative colitis with complication, unspecified location ([facility]    9. Ulcerative colitis, acute, without complications ([facility]    10. Ulcerative proctitis with rectal bleeding (HCC)         Chest pain    Improved. Cath showed no significant cad , I think most of her pain symptoms are non cardiac       Palpitations  Improved with metoprolol 50 mg bid , SVT and wide complex runs ? S/p ablation for atrial tachycardia   No anticoagulation due to colitis , seen by EPS continue aspirin 81   eliquis was stopped       Restless leg and tremors  Related to RLS     Hypertension  If bp is low stop or lower metoprolol , she is asking for handicap placard , will give her due to dizziness     SOB (shortness of breath)  She has lower extremity swelling and shortness of breath but echo is normal , leg venous ablation was done by Dr Martin Chilel      Dyslipidemia :  All available lab work was reviewed. Patient was advised to repeat lab work before next visit. Necessary orders were placed , instructions given by myself       Counseled extensively and medication compliance urged. We discussed that for the  prevention of ASCVD our  goal is aggressive risk modification. Patient is encouraged to exercise even a brisk walk for 30 minutes  at least 3 to 4 times a week   Various goals were discussed and questions answered. Continue current medications. Appropriate prescriptions are addressed and refills ordered. Questions answered and patient verbalizes understanding. Call for any problems, questions, or concerns. Continue all other medications of all above medical condition listed as is. Return in about 6 months (around 12/10/2022).     Please note this report has been partially produced using speech recognition software and may contain errors related to that system including errors in grammar, punctuation, and spelling, as well as words and phrases that may be inappropriate. If there are any questions or concerns please feel free to contact the dictating provider for clarification.

## 2022-06-27 ENCOUNTER — HOSPITAL ENCOUNTER (EMERGENCY)
Age: 72
Discharge: HOME | End: 2022-06-27
Payer: MEDICARE

## 2022-06-27 VITALS
TEMPERATURE: 97.6 F | HEART RATE: 80 BPM | RESPIRATION RATE: 18 BRPM | DIASTOLIC BLOOD PRESSURE: 74 MMHG | OXYGEN SATURATION: 100 % | SYSTOLIC BLOOD PRESSURE: 131 MMHG

## 2022-06-27 DIAGNOSIS — Z43.2 ILEOSTOMY CARE (HCC): Primary | ICD-10-CM

## 2022-06-27 PROCEDURE — 99282 EMERGENCY DEPT VISIT SF MDM: CPT

## 2022-06-27 NOTE — ED NOTES
Pt wants staff to measure and ensure that the illeostomy bag is not going to pop off in 1 hr and [facility] did not give her good supplies.       Kathy Hooper RN  06/27/22 3424

## 2022-06-27 NOTE — ED NOTES
Pt provided with new illeostomy bag, powder, extra wafer, and another new bag. Pt instructed if she continues to have issues with bag that she needs to contact [facility] or her home health nurse. Pt reports that she was just instructed to come to ED if she had any issues with it.       Mateus Suero RN  06/27/22 8780

## 2022-06-27 NOTE — ED NOTES
Patient arrives to ED with complaints of wafer not staying in place from her Ileostomy surgery preformed on 6/18/2022. Patients skin area stoma area is red and in flammed and she states she puts on a wafer and it just starts to \"pop\" off within an hour. Patient is alert and oriented x4 and ambulatory.       Rylan Day RN  06/27/22 5204

## 2022-06-27 NOTE — ED PROVIDER NOTES
Emergency [address] EMERGENCY DEPARTMENT    Patient: Franki Sparks  MRN: [id number]  : 1950  Date of Evaluation: 2022  ED Provider: Silvana Ledezma PA-C    Chief Complaint       Chief Complaint   Patient presents with    Post-op Problem     had surgery 2022 ileostomy placed. Angel Munoz is a 71 y.o. female who presents to the emergency department for an issue with her ileostomy site. Patient had ileostomy done at [facility] on 2022. Patient states she just received her new ostomy bags in the mail yesterday and has already gone through most of the box because they repeatedly pop off. She states she has had some redness around the site since she was in the hospital and as a result, nothing adheres to her skin. She presents hoping we have supplies that will stick. She denies HHC is scheduled to come out in 2 days. ROS     INTEGUMENT:  + redness around ileostomy site. Past History     Past Medical History:   Diagnosis Date    Abnormal EKG     Bradycardia     Chronic colitis 2022    Edema leg     Family history of coronary artery disease     Former smoker     H/O percutaneous left heart catheterization 2022    Normal coronaries    History of cardiac monitoring 10/18/2019    48 hr monitor, Short runs of SVT, few PAC and PVC noted, no significant arrhythmias, no significant pauses.  Hx of cardiovascular stress test 10/17/2019    Fair Exercise Tolerance. No chest pain noted during testing.  Hx of Doppler echocardiogram 10/17/2019    Normal left ventricle structure and systolic function with an ejection fraction of 55-60%. Grade I diastolic dysfunction. Sclerotic, but non-stenotic aortic valve. Mild aortic regurgitation with a pressure half time of 823. Normal pulmonary artery pressure. No evidence of pericardial effusion.     Hyperlipidemia     Paroxysmal atrial flutter ([address] 2021 Exercise induced 12/22/2021 stress test    Pre-op evaluation 10/2019    PVC's (premature ventricular contractions)     SOB (shortness of breath)     Ulcerative colitis with complication ([facility] 34/23/1139    Ulcerative colitis, acute, without complications ([facility] 79/28/7523     Past Surgical History:   Procedure Laterality Date    ABLATION OF DYSRHYTHMIC FOCUS  02/02/2022    atrial tachycardia ablation    COLOSTOMY N/A 05/09/2019    BOWEL RESECTION COLOSTOMY HARTMANS PROCEDURE INCIDENTAL APPENDECTOMY MULTIPLE SMALL BOWEL SEROSAL REPAIR performed by Rosalina Gomez MD at [place] [address]       Social History     Socioeconomic History    Marital status:      Spouse name: Not on file    Number of children: Not on file    Years of education: Not on file    Highest education level: Not on file   Occupational History    Not on file   Tobacco Use    Smoking status: Former Smoker     Packs/day: 0.25     Years: 3.00     Pack years: 0.75    Smokeless tobacco: Never Used   Vaping Use    Vaping Use: Never used   Substance and Sexual Activity    Alcohol use: Yes     Alcohol/week: 1.0 standard drink     Types: 1 Cans of beer per week     Comment: once a week    Drug use: Never    Sexual activity: Not on file   Other Topics Concern    Not on file   Social History Narrative    Not on file     Social Determinants of Health     Financial Resource Strain:     Difficulty of Paying Living Expenses: Not on file   Food Insecurity:     Worried About Running Out of Food in the Last Year: Not on file    Leidy of Food in the Last Year: Not on file   Transportation Needs:     Lack of Transportation (Medical): Not on file    Lack of Transportation (Non-Medical):  Not on file   Physical Activity:     Days of Exercise per Week: Not on file    Minutes of Exercise per Session: Not on file   Stress:     Feeling of Stress : Not on file   Social Connections:     Frequency of Communication with Friends and Family: Not on file    Frequency of Social Gatherings with Friends and Family: Not on file    Attends Restorationist Services: Not on file    Active Member of Clubs or Organizations: Not on file    Attends Club or Organization Meetings: Not on file    Marital Status: Not on file   Intimate Partner Violence:     Fear of Current or Ex-Partner: Not on file    Emotionally Abused: Not on file    Physically Abused: Not on file    Sexually Abused: Not on file   Housing Stability:     Unable to Pay for Housing in the Last Year: Not on file    Number of [place] in the Last Year: Not on file    Unstable Housing in the Last Year: Not on file       Medications/Allergies     Previous Medications    ASPIRIN EC 81 MG EC TABLET    Take 1 tablet by mouth daily    ATORVASTATIN (LIPITOR) 10 MG TABLET    Take 10 mg by mouth daily Pt states she's taking 3x wk    CHOLECALCIFEROL (VITAMIN D3) 1.25 MG (07488 UT) TABS    Take by mouth    DICYCLOMINE (BENTYL) 20 MG TABLET    TAKE 1 TABLET BY MOUTH 4 TIMES DAILY AS NEEDED    FERROUS SULFATE (IRON 325) 325 (65 FE) MG TABLET    Take 325 mg by mouth 3 times daily (with meals)    GABAPENTIN (NEURONTIN) 300 MG CAPSULE    Take 300 mg by mouth. HANDICAP PLACARD MISC    by Does not apply route    LANSOPRAZOLE (PREVACID) 30 MG DELAYED RELEASE CAPSULE    TAKE 1 CAPSULE BY MOUTH ONCE DAILY BEFORE A MEAL    MAGNESIUM OXIDE (MAG-OX) 400 MG TABLET    Take 1 tablet by mouth daily    MELATONIN 3 MG TABS TABLET    Take 20 mg by mouth daily     METOPROLOL TARTRATE (LOPRESSOR) 50 MG TABLET    Take 1 tablet by mouth 2 times daily     No Known Allergies     Physical Exam       ED Triage Vitals [06/27/22 0159]   BP Temp Temp Source Heart Rate Resp SpO2 Height Weight   131/74 97.6 °F (36.4 °C) Oral 80 18 100 % -- --     GENERAL APPEARANCE:  Well-developed, well-nourished, no acute distress. HEAD:  NC/AT. EYES:  Sclera anicteric.    ENT:  Ears, nose, mouth normal. NECK:  Supple. LUNGS:   Respirations unlabored. ABDOMEN:  Soft, non-distended. Ileostomy site with surrounding erythema. BS active. EXTREMITIES:  No acute deformities. SKIN:  Warm and dry. NEUROLOGICAL:  Alert and oriented. PSYCHIATRIC:  Normal mood. ED Course and MDM   -  Patient seen and evaluated in the emergency department. -  Triage and nursing notes reviewed and incorporated. -  Old chart records reviewed and incorporated. -  Supervising physician was  [facility] [place]. Patient was seen independently. Maddie Quintero RN, obtained supplies from the nursing supervisor and applied new bag to patient's site. Supplied additional supplies. [facility] will be out Tuesday. FU with surgeon at [address] [place] [address]. Return as needed. -  Disposition:  Home    In light of current events, I did utilize appropriate PPE (including N95 and surgical face mask, safety glasses, and gloves, as recommended by the health facility/national standard best practice, during my bedside interactions with the patient. Final Impression      1.  Ileostomy care [facility])            DISPOSITION        Marcy Enriquez PA-C  [address], [place]  06/27/22 1843

## 2022-07-18 RX ORDER — METOPROLOL TARTRATE 50 MG/1
50 TABLET, FILM COATED ORAL 2 TIMES DAILY
Qty: 180 TABLET | Refills: 1 | Status: SHIPPED | OUTPATIENT
Start: 2022-07-18

## 2022-07-27 NOTE — TELEPHONE ENCOUNTER
Received call from pt requesting refill of Vitamin D. Per last TM 4/13, pt had declined to come back to office and was to f/u with PCP for further refills. Pt stated she had not followed up with PCP regarding Vitamin D but did set up follow up visit with Alisson Bojorquez 8/9  but will be out of medication this week. Informed pt that we may not be able to refill due to lack of follow up visit and lack of recent blood work. Rx pending for review.

## 2022-08-09 ENCOUNTER — OFFICE VISIT (OUTPATIENT)
Dept: NEUROLOGY | Age: 72
End: 2022-08-09
Payer: MEDICARE

## 2022-08-09 VITALS
OXYGEN SATURATION: 97 % | BODY MASS INDEX: 26.66 KG/M2 | HEART RATE: 80 BPM | SYSTOLIC BLOOD PRESSURE: 118 MMHG | WEIGHT: 180 LBS | DIASTOLIC BLOOD PRESSURE: 64 MMHG | HEIGHT: 69 IN

## 2022-08-09 DIAGNOSIS — E55.9 VITAMIN D DEFICIENCY: ICD-10-CM

## 2022-08-09 DIAGNOSIS — G25.81 RESTLESS LEGS: Primary | ICD-10-CM

## 2022-08-09 DIAGNOSIS — R20.2 BILATERAL LEG PARESTHESIA: ICD-10-CM

## 2022-08-09 PROCEDURE — 36415 COLL VENOUS BLD VENIPUNCTURE: CPT | Performed by: NURSE PRACTITIONER

## 2022-08-09 PROCEDURE — G8417 CALC BMI ABV UP PARAM F/U: HCPCS | Performed by: NURSE PRACTITIONER

## 2022-08-09 PROCEDURE — G8400 PT W/DXA NO RESULTS DOC: HCPCS | Performed by: NURSE PRACTITIONER

## 2022-08-09 PROCEDURE — 99214 OFFICE O/P EST MOD 30 MIN: CPT | Performed by: NURSE PRACTITIONER

## 2022-08-09 PROCEDURE — 1123F ACP DISCUSS/DSCN MKR DOCD: CPT | Performed by: NURSE PRACTITIONER

## 2022-08-09 PROCEDURE — 3017F COLORECTAL CA SCREEN DOC REV: CPT | Performed by: NURSE PRACTITIONER

## 2022-08-09 PROCEDURE — 1090F PRES/ABSN URINE INCON ASSESS: CPT | Performed by: NURSE PRACTITIONER

## 2022-08-09 PROCEDURE — G8427 DOCREV CUR MEDS BY ELIG CLIN: HCPCS | Performed by: NURSE PRACTITIONER

## 2022-08-09 PROCEDURE — 1036F TOBACCO NON-USER: CPT | Performed by: NURSE PRACTITIONER

## 2022-08-09 RX ORDER — GABAPENTIN 100 MG/1
100 CAPSULE ORAL DAILY
Qty: 30 CAPSULE | Refills: 0 | Status: SHIPPED | OUTPATIENT
Start: 2022-08-09 | End: 2022-09-06 | Stop reason: SDUPTHER

## 2022-08-09 RX ORDER — TRAZODONE HYDROCHLORIDE 50 MG/1
TABLET ORAL
COMMUNITY
Start: 2022-08-02

## 2022-08-09 NOTE — PROGRESS NOTES
8/9/22    Cole Fagan  1950    Chief Complaint   Patient presents with    Follow-up     Pt states she is just here for a refill of vitamin D        History of Present Illness  Deya Cabrera is a 71 y.o. female presenting today for follow-up of Vitamin D deficiency. She also has new complaints of BLE parasthesias. She has a hx of Afib, RLS, CAMPBELL, colitis s/p ileostomy. She takes     She states she has been having RLS for years, she used to follow with [facility] clinic. She is no longer taking Requip. She takes Gabapentin 300 mg for RLS prescribed by her PCP. She has a history of colitis and has an ileostomy bag. Recently, she has been having \"pins and needle\" pain in her bilateral toes and bottom of feet. The RLS and pain in her feet keeps her up at night. She denies history of DM, excessive alcohol use, chemotherapy, gastric bypass however she does have an ileostomy bag due to colitis. She has been evaluated for CAMPBELL and refuses to wear a pap device. Current Outpatient Medications   Medication Sig Dispense Refill    Cholecalciferol (VITAMIN D3) 1.25 MG (11494 UT) TABS Take 50,000 Units by mouth once a week 12 tablet 2    traZODone (DESYREL) 50 MG tablet Patient states she is takingg 200 mg because she googled it was okay      gabapentin (NEURONTIN) 100 MG capsule Take 1 capsule by mouth in the morning for 30 days. 30 capsule 0    metoprolol tartrate (LOPRESSOR) 50 MG tablet Take 1 tablet by mouth in the morning and 1 tablet before bedtime.  180 tablet 1    dicyclomine (BENTYL) 20 MG tablet TAKE 1 TABLET BY MOUTH 4 TIMES DAILY AS NEEDED      aspirin EC 81 MG EC tablet Take 1 tablet by mouth daily 90 tablet 1    magnesium oxide (MAG-OX) 400 MG tablet Take 1 tablet by mouth daily 30 tablet 1    lansoprazole (PREVACID) 30 MG delayed release capsule TAKE 1 CAPSULE BY MOUTH ONCE DAILY BEFORE A MEAL      ferrous sulfate (IRON 325) 325 (65 Fe) MG tablet Take 325 mg by mouth daily (with breakfast)      gabapentin (NEURONTIN) 300 MG capsule Take 300 mg by mouth. Handicap Luis Eduardo MISC by Does not apply route 1 each 0    atorvastatin (LIPITOR) 10 MG tablet Take 10 mg by mouth daily Pt states she's taking 3x wk      melatonin 3 MG TABS tablet Take 20 mg by mouth daily        No current facility-administered medications for this visit. Physical Exam:  Also present during visit:  alone . Mental Status   Orientation: oriented to person, oriented to place, oriented to problem, and oriented to time    Mood/affectappropriate mood and appropriate affect   Memory/Other: recent memory intact, remote memory intact, fund of knowledge intact, attention span normal, and concentration normal  Language  Language: (normal) language, no dysarthria, (normal) articulation, and no dysphasia/aphasia  Cranial Nerves   Eyes: pupils normal size and reactive to light and visual fields appear full   CN III, IV, VI : extraocular muscle strength normal, normal pursuit, no nystagmus, and no ptosis   Facial Motor: normal facial motor   CN XII: tongue protrudes midline  Motor/Coordination Exam   Power: motor strength appears intact throughout, no arm drift, and normal tone   Coordination: normal finger-to-nose, forearm rotation intact, and rapid alternating movement normal  Gait and Stance   Gait/Posture: station normal, casual gait normal, ambulates independently , tiptoe normal, steady in Romberg's position with eyes open and closed, and abnormal Romberg's test mild   Sensation: Decreased pinprick bilaterally length dependent, decreased vibration BLE. Temperature intact bilaterally   Reflexes: 1/4 patellar bilaterally        /64 (Site: Left Upper Arm, Position: Sitting, Cuff Size: Medium Adult)   Pulse 80   Ht 5' 9\" (1.753 m)   Wt 180 lb (81.6 kg)   SpO2 97%   BMI 26.58 kg/m²     Assessment and Plan     Diagnosis Orders   1.  Restless legs  Cholecalciferol (VITAMIN D3) 1.25 MG (30836 UT) TABS    Vitamin B12 & Folate    Ferritin Ferritin    Vitamin B12 & Folate      2. Bilateral leg paresthesia  Vitamin B12 & Folate    TSH    Electrophoresis Protein, Serum    Vitamin D 25 Hydroxy    Nerve Conduction Test with EMG    gabapentin (NEURONTIN) 100 MG capsule    Vitamin D 25 Hydroxy    Electrophoresis Protein, Serum    TSH    Vitamin B12 & Folate      3. Vitamin D deficiency  Cholecalciferol (VITAMIN D3) 1.25 MG (28774 UT) TABS    Vitamin D 25 Hydroxy    Vitamin D 25 Hydroxy        Jimmy Blush was seen in neurological follow up in regards to vitamin d deficiency and new complaints of BLE parasthesias. On exam, Jimmy Blush has evidence of decreased sensation in her bilateral lower extremities, I would like to evaluate this further by obtaining an EMG. I also ordered labs to look for reversible causes of neuropathy secondary to colitis/ileostomy. April's PCP was prescribing Gabapentin 300 mg for RLS, I have sent in Gabapentin 100 mg in addition to that to optimize therapy for her neuropathic pain. I will be in touch with Jimmy Allen as her test results come back. Return in about 3 months (around 11/9/2022).     Mari Akers, FABIEN - CNP

## 2022-08-10 ENCOUNTER — TELEPHONE (OUTPATIENT)
Dept: NEUROLOGY | Age: 72
End: 2022-08-10

## 2022-08-10 LAB
ALBUMIN SERPL-MCNC: 3.6 G/DL (ref 3.1–4.9)
ALPHA-1-GLOBULIN: 0.3 G/DL (ref 0.2–0.4)
ALPHA-2-GLOBULIN: 0.9 G/DL (ref 0.4–1.1)
BETA GLOBULIN: 1.4 G/DL (ref 0.9–1.6)
FERRITIN: 40.5 NG/ML (ref 15–150)
FOLATE: 9.66 NG/ML (ref 4.78–24.2)
GAMMA GLOBULIN: 1.3 G/DL (ref 0.6–1.8)
SPE/IFE INTERPRETATION: NORMAL
TOTAL PROTEIN: 7.4 G/DL (ref 6.4–8.2)
TSH SERPL DL<=0.05 MIU/L-ACNC: 1.3 UIU/ML (ref 0.27–4.2)
VITAMIN B-12: 230 PG/ML (ref 211–911)
VITAMIN D 25-HYDROXY: 66.9 NG/ML

## 2022-08-15 ENCOUNTER — OFFICE VISIT (OUTPATIENT)
Dept: CARDIOLOGY CLINIC | Age: 72
End: 2022-08-15
Payer: MEDICARE

## 2022-08-15 VITALS
WEIGHT: 187 LBS | DIASTOLIC BLOOD PRESSURE: 62 MMHG | BODY MASS INDEX: 27.7 KG/M2 | HEIGHT: 69 IN | SYSTOLIC BLOOD PRESSURE: 102 MMHG

## 2022-08-15 DIAGNOSIS — Z98.890 S/P COLOSTOMY TAKEDOWN: ICD-10-CM

## 2022-08-15 DIAGNOSIS — I47.1 ATRIAL TACHYCARDIA (HCC): Primary | ICD-10-CM

## 2022-08-15 DIAGNOSIS — I49.3 PVC (PREMATURE VENTRICULAR CONTRACTION): ICD-10-CM

## 2022-08-15 PROCEDURE — 3017F COLORECTAL CA SCREEN DOC REV: CPT | Performed by: NURSE PRACTITIONER

## 2022-08-15 PROCEDURE — G8427 DOCREV CUR MEDS BY ELIG CLIN: HCPCS | Performed by: NURSE PRACTITIONER

## 2022-08-15 PROCEDURE — 93000 ELECTROCARDIOGRAM COMPLETE: CPT | Performed by: NURSE PRACTITIONER

## 2022-08-15 PROCEDURE — G8400 PT W/DXA NO RESULTS DOC: HCPCS | Performed by: NURSE PRACTITIONER

## 2022-08-15 PROCEDURE — G8417 CALC BMI ABV UP PARAM F/U: HCPCS | Performed by: NURSE PRACTITIONER

## 2022-08-15 PROCEDURE — 99214 OFFICE O/P EST MOD 30 MIN: CPT | Performed by: NURSE PRACTITIONER

## 2022-08-15 PROCEDURE — 1123F ACP DISCUSS/DSCN MKR DOCD: CPT | Performed by: NURSE PRACTITIONER

## 2022-08-15 PROCEDURE — 1036F TOBACCO NON-USER: CPT | Performed by: NURSE PRACTITIONER

## 2022-08-15 PROCEDURE — 1090F PRES/ABSN URINE INCON ASSESS: CPT | Performed by: NURSE PRACTITIONER

## 2022-08-15 ASSESSMENT — ENCOUNTER SYMPTOMS
EYE PAIN: 0
BLOOD IN STOOL: 0
BACK PAIN: 0
EYE ITCHING: 0
SHORTNESS OF BREATH: 0
EYE REDNESS: 0
ABDOMINAL PAIN: 0
NAUSEA: 0
WHEEZING: 0
COLOR CHANGE: 0
SINUS PRESSURE: 0
COUGH: 0
SINUS PAIN: 0
VOMITING: 0
DIARRHEA: 0
CONSTIPATION: 0
CHEST TIGHTNESS: 0
ABDOMINAL DISTENTION: 0

## 2022-08-15 NOTE — PROGRESS NOTES
Electrophysiology follow-up note      Reason for consultation:  Atrial flutter / ? afib    Chief complaint: Here for follow-up on right-sided atrial tachycardia   Referring physician: Miguel A Carter      Primary care physician: Viral Fernandes MD      History of Present Illness: This visit 8/15/2022  Patient here today for follow-up on atrial tachycardia. She reports she has been feeling well. She denies chest pain, palpitations, shortness of breath, lightheadedness, dizziness, edema or syncope. She recently had ileostomy at [facility]. She is having an auction at her house. She is trying to downsize since her significant other passed away. Previous visit 5/13/2022  Patient here today for follow-up on atrial tachycardia. Patient reports she is feeling well. She denies chest pain, palpitations, shortness of breath, lightheadedness, dizziness, edema or syncope. Patient is taking aspirin 81 mg daily. She denies any issues with bleeding. She does report that her colitis is not controlled and she has a follow-up at [facility]. She recently lost her significant other and may be moving closer to family either in [place] or [place]. Previous visit 4/8/2022  Patient here today to discuss stopping anticoagulation. Patient reports her anticoagulation was stopped by Dr. Aminata Fontaine due to blood in stool. Patient has history of colitis. Patient denies chest pain, palpitations, light headedness, dizziness,  or syncope. Patient does complain of shortness of breath with exertion that will resolve with rest. She does have swelling in her bilateral lower legs. This is unchanged from previous office visit. She does drink alcohol occasionally. Pt does not smoke. Pt does drink 2 diet cokes a day. Pt does not exercise.   Patient remains very anxious about home life and her significant others health as he is in the hospital.     Previous visit 3/17/2022  Patient is here today for 1 month follow-up status post ablation of atrial tachycardia. Patient reports that she has been under a lot of stress. She states that she does not have warning water at her house. She states that her significant other had a stroke and has been in the hospital since the last office visit. She states that she has been very anxious. She denies chest pain, palpitations, shortness of breath, lightheadedness, dizziness, edema or syncope. Previous visit 2/8/2022  Patient is here today for 1 week follow-up status post ablation of atrial tachycardia. She denies chest pain, palpitations, shortness of breath, lightheadedness, dizziness, edema or syncope. Patient is a 81-year-old female with history of colitis referred by Dr. Jhonathan De Los Santos for atrial flutter. Patient reports she she states she has chest pressure and feels shortness of breath most of the time. She also has dizziness upon standing. She states that her heart races at any time and this causes symptoms mentioned above. And usually the symptoms last for a few minutes. Patient has swelling in the ankles and feet after a long day but goes away in the morning when she sleeps. Patient drinks cup of coffee in the morning. Patient drinks Diet Coke daily        Pastmedical history:   Past Medical History:   Diagnosis Date    Abnormal EKG     Bradycardia     Chronic colitis 01/20/2022    Edema leg     Family history of coronary artery disease     Former smoker     H/O percutaneous left heart catheterization 02/25/2022    Normal coronaries    History of cardiac monitoring 10/18/2019    48 hr monitor, Short runs of SVT, few PAC and PVC noted, no significant arrhythmias, no significant pauses. Hx of cardiovascular stress test 10/17/2019    Fair Exercise Tolerance. No chest pain noted during testing. Hx of Doppler echocardiogram 10/17/2019    Normal left ventricle structure and systolic function with an ejection fraction of 55-60%. Grade I diastolic tablet 1    dicyclomine (BENTYL) 20 MG tablet TAKE 1 TABLET BY MOUTH 4 TIMES DAILY AS NEEDED      aspirin EC 81 MG EC tablet Take 1 tablet by mouth daily 90 tablet 1    magnesium oxide (MAG-OX) 400 MG tablet Take 1 tablet by mouth daily 30 tablet 1    melatonin 3 MG TABS tablet Take 20 mg by mouth daily       lansoprazole (PREVACID) 30 MG delayed release capsule TAKE 1 CAPSULE BY MOUTH ONCE DAILY BEFORE A MEAL      ferrous sulfate (IRON 325) 325 (65 Fe) MG tablet Take 325 mg by mouth daily (with breakfast)      gabapentin (NEURONTIN) 300 MG capsule Take 300 mg by mouth. Handicap Placard MISC by Does not apply route 1 each 0    atorvastatin (LIPITOR) 10 MG tablet Take 10 mg by mouth daily Pt states she's taking 3x wk       No current facility-administered medications on file prior to visit. Review of Systems:   Review of Systems   Constitutional:  Negative for activity change, chills, fatigue and fever. HENT:  Negative for congestion, sinus pressure and sinus pain. Eyes:  Negative for pain, redness, itching and visual disturbance. Respiratory:  Negative for cough, chest tightness, shortness of breath and wheezing. Cardiovascular:  Negative for chest pain, palpitations and leg swelling. Gastrointestinal:  Negative for abdominal distention, abdominal pain, blood in stool, constipation, diarrhea, nausea and vomiting. Endocrine: Negative for cold intolerance and heat intolerance. Genitourinary:  Negative for difficulty urinating, dysuria and hematuria. Musculoskeletal:  Positive for arthralgias. Negative for back pain, myalgias and neck stiffness. Skin:  Negative for color change, pallor, rash and wound. Allergic/Immunologic: Negative for food allergies. Neurological:  Negative for dizziness, syncope, light-headedness, numbness and headaches. Hematological:  Does not bruise/bleed easily. Psychiatric/Behavioral:  Negative for agitation, behavioral problems and confusion.  The patient is not nervous/anxious. Examination:      Vitals:    08/15/22 1044   BP: 102/62   Site: Right Upper Arm   Position: Sitting   Cuff Size: Medium Adult   Weight: 187 lb (84.8 kg)   Height: 5' 9\" (1.753 m)        Body mass index is 27.62 kg/m². Physical Exam  Constitutional:       Appearance: Normal appearance. HENT:      Head: Normocephalic and atraumatic. Right Ear: External ear normal.      Left Ear: External ear normal.      Nose: No congestion or rhinorrhea. Mouth/Throat:      Mouth: Mucous membranes are moist.      Pharynx: Oropharynx is clear. Eyes:      General:         Right eye: No discharge. Left eye: No discharge. Conjunctiva/sclera: Conjunctivae normal.   Cardiovascular:      Rate and Rhythm: Normal rate and regular rhythm. Pulses: Normal pulses. Heart sounds: Normal heart sounds. No murmur heard. Pulmonary:      Effort: Pulmonary effort is normal.      Breath sounds: Normal breath sounds. No wheezing or rhonchi. Abdominal:      General: Abdomen is flat. Palpations: Abdomen is soft. Genitourinary:     Comments: Ileostomy right lower quadrant  Musculoskeletal:         General: Normal range of motion. Cervical back: Normal range of motion. Right lower leg: No edema. Left lower leg: No edema. Skin:     General: Skin is warm and dry. Neurological:      General: No focal deficit present. Mental Status: She is alert and oriented to person, place, and time. Psychiatric:         Mood and Affect: Mood normal.         Thought Content:  Thought content normal.     CBC:   Lab Results   Component Value Date/Time    WBC 11.4 04/06/2022 01:36 PM    HGB 12.5 04/06/2022 01:36 PM    HCT 40.1 04/06/2022 01:36 PM     04/06/2022 01:36 PM     Lipids:  Lab Results   Component Value Date    CHOL 131 12/02/2021    TRIG 87 12/02/2021    HDL 45 12/02/2021    LDLCALC 69 12/02/2021     PT/INR:   Lab Results   Component Value Date/Time    INR 1.46 04/03/2022 12:04 PM        BMP:    Lab Results   Component Value Date     04/06/2022    K 4.6 04/06/2022     04/06/2022    CO2 23 04/06/2022    BUN 9 04/06/2022     CMP:   Lab Results   Component Value Date    AST 26 04/06/2022    PROT 7.4 08/09/2022    BILITOT 0.3 04/06/2022    ALKPHOS 82 04/06/2022     TSH:    EKG Interpretation:  SINUS RHYTHM with PVC      IMPRESSION / RECOMMENDATIONS:     Status post ablation of atrial tachycardia  History of diverticulitis status post surgery colostomy now she has colitis  History of GI bleed   Obesity BMI 32    Patient denies palpitations or tachycardia. She reports that she has been feeling well. EKG reveals sinus rhythm with few PVCs  Patient recently had colostomy  Patient appears to be tolerating aspirin 81 mg daily  We will continue Lopressor 50 mg twice daily     Continue magnesium 400 mg daily-you PVCs noted on EKG    Patient to follow-up with cardiology as scheduled  Patient to follow-up with EP as needed      Thanks again for allowing me to participate in care of this patient. Please call me if you have any questions. With best regards. Luis Mendoza, FABIEN - CNP, 8/15/2022 11:02 AM     Please note this report has been partially produced using speech recognition software and may contain errors related to that system including errors in grammar, punctuation, and spelling, as well as words and phrases that may be inappropriate. If there are any questions or concerns please feel free to contact the dictating provider for clarification.

## 2022-09-02 DIAGNOSIS — R20.2 BILATERAL LEG PARESTHESIA: ICD-10-CM

## 2022-09-02 RX ORDER — GABAPENTIN 300 MG/1
300 CAPSULE ORAL
Qty: 90 CAPSULE | Status: CANCELLED | OUTPATIENT
Start: 2022-09-02

## 2022-09-02 NOTE — TELEPHONE ENCOUNTER
Pt is in need of a refill of gabapentin 400 mg sent to [facility].

## 2022-09-06 RX ORDER — GABAPENTIN 100 MG/1
100 CAPSULE ORAL DAILY
Qty: 90 CAPSULE | Refills: 3 | Status: SHIPPED | OUTPATIENT
Start: 2022-09-06 | End: 2022-10-06

## 2022-09-08 RX ORDER — GABAPENTIN 300 MG/1
300 CAPSULE ORAL DAILY
Qty: 90 CAPSULE | Refills: 1 | Status: SHIPPED | OUTPATIENT
Start: 2022-09-08 | End: 2022-12-07

## 2022-10-03 ENCOUNTER — OFFICE VISIT (OUTPATIENT)
Dept: NEUROLOGY | Age: 72
End: 2022-10-03
Payer: MEDICARE

## 2022-10-03 DIAGNOSIS — R20.2 BILATERAL LEG PARESTHESIA: ICD-10-CM

## 2022-10-03 DIAGNOSIS — M54.16 LUMBAR RADICULOPATHY, CHRONIC: Primary | ICD-10-CM

## 2022-10-03 PROCEDURE — 95886 MUSC TEST DONE W/N TEST COMP: CPT | Performed by: PHYSICAL MEDICINE & REHABILITATION

## 2022-10-03 PROCEDURE — 95910 NRV CNDJ TEST 7-8 STUDIES: CPT | Performed by: PHYSICAL MEDICINE & REHABILITATION

## 2022-10-03 NOTE — PROGRESS NOTES
EMG    Risks and benefits of study discussed. Specific and common risks of pain and bleeding as well as uncommon side effects of infection, hematoma and vasovagal episodes    Patient agreeable to testing and consents to such. Clinical: Many years of restless legs, foot and leg paresthesias which have worsened, leg weakness and occasional sciatica but none in the recent years. Motor NCS:  Tibial amplitudes moderately depressed with normal latencies  Peroneal amplitudes moderately depressed with normal latencies and borderline conduction velocities. Sensory NCS:  Absent sural and peroneal responses bilateral  Leg edema compromises sensory nerve conductions substantially. Needle EMG: Enlargement of motor units mild to moderate in S1 areas mild in L5 muscles of both legs. Impression:  #1 mild to moderate, S1 greater than L5 chronic neurogenic changes in the lower limbs most likely a result of chronic lumbosacral radiculopathies. No signs of severe or recent axon loss indicated. #2 leg edema presents as a moderate technical limitation, and limits the utility of sensory and motor conduction parameters. #3 no definitive evidence of generalized peripheral neuropathy, but this cannot be exclusively ruled out due to diagnoses #1 and 2 listed above.

## 2022-11-04 ENCOUNTER — TELEPHONE (OUTPATIENT)
Dept: NEUROLOGY | Age: 72
End: 2022-11-04

## 2022-11-04 NOTE — TELEPHONE ENCOUNTER
Ardayneaudrey Yuetiffany called and left a voicemail stating her hands are becoming tingly,numb & cold at times. She is wondering if this is related to the neuropathy and if there is anything else she should do about it? Please advise.

## 2022-11-09 ENCOUNTER — OFFICE VISIT (OUTPATIENT)
Dept: NEUROLOGY | Age: 72
End: 2022-11-09
Payer: MEDICARE

## 2022-11-09 VITALS
OXYGEN SATURATION: 99 % | BODY MASS INDEX: 27.25 KG/M2 | HEART RATE: 64 BPM | HEIGHT: 69 IN | SYSTOLIC BLOOD PRESSURE: 118 MMHG | DIASTOLIC BLOOD PRESSURE: 74 MMHG | WEIGHT: 184 LBS

## 2022-11-09 DIAGNOSIS — R20.2 PARESTHESIA OF HAND, BILATERAL: ICD-10-CM

## 2022-11-09 DIAGNOSIS — M54.17 LUMBOSACRAL RADICULOPATHY: Primary | ICD-10-CM

## 2022-11-09 PROCEDURE — 1123F ACP DISCUSS/DSCN MKR DOCD: CPT | Performed by: NURSE PRACTITIONER

## 2022-11-09 PROCEDURE — G8400 PT W/DXA NO RESULTS DOC: HCPCS | Performed by: NURSE PRACTITIONER

## 2022-11-09 PROCEDURE — G8484 FLU IMMUNIZE NO ADMIN: HCPCS | Performed by: NURSE PRACTITIONER

## 2022-11-09 PROCEDURE — 99214 OFFICE O/P EST MOD 30 MIN: CPT | Performed by: NURSE PRACTITIONER

## 2022-11-09 PROCEDURE — 3017F COLORECTAL CA SCREEN DOC REV: CPT | Performed by: NURSE PRACTITIONER

## 2022-11-09 PROCEDURE — G8417 CALC BMI ABV UP PARAM F/U: HCPCS | Performed by: NURSE PRACTITIONER

## 2022-11-09 PROCEDURE — 1090F PRES/ABSN URINE INCON ASSESS: CPT | Performed by: NURSE PRACTITIONER

## 2022-11-09 PROCEDURE — 1036F TOBACCO NON-USER: CPT | Performed by: NURSE PRACTITIONER

## 2022-11-09 PROCEDURE — G8427 DOCREV CUR MEDS BY ELIG CLIN: HCPCS | Performed by: NURSE PRACTITIONER

## 2022-11-09 RX ORDER — GABAPENTIN 400 MG/1
400 CAPSULE ORAL NIGHTLY
Qty: 90 CAPSULE | Refills: 0 | Status: SHIPPED | OUTPATIENT
Start: 2022-11-09 | End: 2023-02-09

## 2022-11-09 NOTE — PROGRESS NOTES
11/9/22    Yasmin Maker  1950    Chief Complaint   Patient presents with    Follow-up     Restless leg syndrome  & hand numbness worsening       History of Present Illness  Eric Malik is a 71 y.o. female presenting today for follow-up of: bilateral lower extremity parasthesias, EMG on 10/3/22 showed chronic lumbosacral radiculopathies, no definitive evidence of generalized neuropathy. Labs for reversible causes of neuropathy were normal.  She takes 400 mg of gabapentin at bedtime for neuropathic pain and vitamin D supplement. At today's visit, Eric Malik was upset that this office did not respond to her complaints of bilateral finger tingling via MyChart. She tells me that she has tingling in all 10 of her fingertips. Her left hand is worse than her right. She also gets shooting pain from her left wrist to her left thumb with wrist abduction. She tells me her fourth finger on her left hand got so numb and cold it felt like if she looked at it it would be \"black and dead. \"    I offered Eric Malik physical therapy for her lumbosacral radiculopathy found on EMG as she was complaining of increased left lateral lower leg pain. I also offered an EMG to evaluate her upper extremities for carpal tunnel versus radiculopathy. She declined all of this. She tells me she is moving to [place] soon as she can get out of here because nobody is helping her. She tells me she does not need physical therapy because she is moving boxes up the stairs and that is a same as physical therapy. She does have wrist splints at home that she can wear. Current Outpatient Medications   Medication Sig Dispense Refill    gabapentin (NEURONTIN) 400 MG capsule Take 1 capsule by mouth at bedtime for 92 days.  90 capsule 0    Cholecalciferol (VITAMIN D3) 1.25 MG (37409 UT) TABS Take 50,000 Units by mouth once a week 12 tablet 2    traZODone (DESYREL) 50 MG tablet Patient states she is takingg 200 mg because she googled it was okay metoprolol tartrate (LOPRESSOR) 50 MG tablet Take 1 tablet by mouth in the morning and 1 tablet before bedtime. 180 tablet 1    aspirin EC 81 MG EC tablet Take 1 tablet by mouth daily 90 tablet 1    magnesium oxide (MAG-OX) 400 MG tablet Take 1 tablet by mouth daily 30 tablet 1    melatonin 3 MG TABS tablet Take 20 mg by mouth daily       lansoprazole (PREVACID) 30 MG delayed release capsule TAKE 1 CAPSULE BY MOUTH ONCE DAILY BEFORE A MEAL      ferrous sulfate (IRON 325) 325 (65 Fe) MG tablet Take 325 mg by mouth daily (with breakfast)      Handicap Placard MISC by Does not apply route 1 each 0    atorvastatin (LIPITOR) 10 MG tablet Take 10 mg by mouth daily Pt states she's taking 3x wk      dicyclomine (BENTYL) 20 MG tablet TAKE 1 TABLET BY MOUTH 4 TIMES DAILY AS NEEDED (Patient not taking: Reported on 11/9/2022)       No current facility-administered medications for this visit.        Physical Exam:  Mental Status              Orientation: oriented to person, oriented to place, oriented to problem, and oriented to time                        Mood/affectappropriate mood and appropriate affect              Memory/Other: recent memory intact, remote memory intact, fund of knowledge intact, attention span normal, and concentration normal  Language  Language: (normal) language, no dysarthria, (normal) articulation, and no dysphasia/aphasia  Cranial Nerves              Eyes: pupils normal size and reactive to light and visual fields appear full              CN III, IV, VI : extraocular muscle strength normal, normal pursuit, no nystagmus, and no ptosis              Facial Motor: normal facial motor              CN XII: tongue protrudes midline  Motor/Coordination Exam              Power: motor strength appears intact throughout, no arm drift, and normal tone              Coordination: normal finger-to-nose, forearm rotation intact, and rapid alternating movement normal  Gait and Stance              Gait/Posture: station normal, casual gait normal, ambulates independently , tiptoe normal, steady in Romberg's position with eyes open and closed, and abnormal Romberg's test mild              Sensation: Decreased pinprick bilaterally length dependent, decreased vibration BLE. Temperature intact bilaterally              Reflexes: 1/4 patellar bilaterally           /74 (Site: Right Upper Arm, Position: Sitting, Cuff Size: Medium Adult)   Pulse 64   Ht 5' 9\" (1.753 m)   Wt 184 lb (83.5 kg)   SpO2 99%   BMI 27.17 kg/m²     Assessment and Plan     Diagnosis Orders   1. Lumbosacral radiculopathy        2. Paresthesia of hand, bilateral          Gemma Davis was seen in neurological follow up in regards to chronic lumbosacral radiculopathy, new complaints of bilateral hand tingling. Gemma Davis was very upset at the appointment today. She states nobody is helping her. She complains of bilateral tingling in her fingertips. Started 4 days ago. She states she has been playing with her dog more lately. I offered an EMG of her upper extremities to evaluate for carpal tunnel versus radiculopathy however she tells me she would not have surgery to correct this and she is moving to [place] as soon as she can get out of here. Therefore testing was recommended but was not ordered per her request.  In regards to her lumbosacral radiculopathy, physical therapy was recommended and she declined. I did refill her gabapentin 400 mg at bedtime for neuropathic pain for 3 months without any refills as she is moving to [place]. Return if symptoms worsen or fail to improve.     Jameel Pérez, APRN - CNP

## 2022-12-19 ENCOUNTER — OFFICE VISIT (OUTPATIENT)
Dept: CARDIOLOGY CLINIC | Age: 72
End: 2022-12-19
Payer: MEDICARE

## 2022-12-19 VITALS
OXYGEN SATURATION: 96 % | SYSTOLIC BLOOD PRESSURE: 100 MMHG | WEIGHT: 186 LBS | HEART RATE: 67 BPM | DIASTOLIC BLOOD PRESSURE: 60 MMHG | BODY MASS INDEX: 27.55 KG/M2 | HEIGHT: 69 IN

## 2022-12-19 DIAGNOSIS — I49.3 PVC (PREMATURE VENTRICULAR CONTRACTION): ICD-10-CM

## 2022-12-19 DIAGNOSIS — Z98.890 S/P LEFT HEART CATHETERIZATION BY PERCUTANEOUS APPROACH: ICD-10-CM

## 2022-12-19 DIAGNOSIS — I87.2 CHRONIC VENOUS INSUFFICIENCY: ICD-10-CM

## 2022-12-19 DIAGNOSIS — I83.893 VARICOSE VEINS OF LOWER EXTREMITIES WITH COMPLICATIONS, BILATERAL: ICD-10-CM

## 2022-12-19 DIAGNOSIS — I47.1 ATRIAL TACHYCARDIA (HCC): ICD-10-CM

## 2022-12-19 DIAGNOSIS — Z98.890 STATUS POST ENDOVENOUS RADIOFREQUENCY ABLATION (RFA) OF SAPHENOUS VEIN: ICD-10-CM

## 2022-12-19 DIAGNOSIS — I87.2 VENOUS (PERIPHERAL) INSUFFICIENCY: Primary | ICD-10-CM

## 2022-12-19 DIAGNOSIS — I83.892 VARICOSE VEINS OF LEFT LOWER EXTREMITY WITH OTHER COMPLICATIONS: ICD-10-CM

## 2022-12-19 DIAGNOSIS — I48.92 PAROXYSMAL ATRIAL FLUTTER (HCC): ICD-10-CM

## 2022-12-19 DIAGNOSIS — I10 PRIMARY HYPERTENSION: ICD-10-CM

## 2022-12-19 PROCEDURE — 3017F COLORECTAL CA SCREEN DOC REV: CPT | Performed by: NURSE PRACTITIONER

## 2022-12-19 PROCEDURE — 1123F ACP DISCUSS/DSCN MKR DOCD: CPT | Performed by: NURSE PRACTITIONER

## 2022-12-19 PROCEDURE — G8417 CALC BMI ABV UP PARAM F/U: HCPCS | Performed by: NURSE PRACTITIONER

## 2022-12-19 PROCEDURE — 99214 OFFICE O/P EST MOD 30 MIN: CPT | Performed by: NURSE PRACTITIONER

## 2022-12-19 PROCEDURE — 1036F TOBACCO NON-USER: CPT | Performed by: NURSE PRACTITIONER

## 2022-12-19 PROCEDURE — G8400 PT W/DXA NO RESULTS DOC: HCPCS | Performed by: NURSE PRACTITIONER

## 2022-12-19 PROCEDURE — G8484 FLU IMMUNIZE NO ADMIN: HCPCS | Performed by: NURSE PRACTITIONER

## 2022-12-19 PROCEDURE — G8427 DOCREV CUR MEDS BY ELIG CLIN: HCPCS | Performed by: NURSE PRACTITIONER

## 2022-12-19 PROCEDURE — 3078F DIAST BP <80 MM HG: CPT | Performed by: NURSE PRACTITIONER

## 2022-12-19 PROCEDURE — 3074F SYST BP LT 130 MM HG: CPT | Performed by: NURSE PRACTITIONER

## 2022-12-19 PROCEDURE — 1090F PRES/ABSN URINE INCON ASSESS: CPT | Performed by: NURSE PRACTITIONER

## 2022-12-19 ASSESSMENT — ENCOUNTER SYMPTOMS
COUGH: 0
SHORTNESS OF BREATH: 1

## 2023-01-10 ENCOUNTER — PROCEDURE VISIT (OUTPATIENT)
Dept: CARDIOLOGY CLINIC | Age: 73
End: 2023-01-10
Payer: MEDICARE

## 2023-01-10 DIAGNOSIS — I83.892 VARICOSE VEINS OF LEFT LOWER EXTREMITY WITH OTHER COMPLICATIONS: ICD-10-CM

## 2023-01-10 DIAGNOSIS — I87.2 VENOUS (PERIPHERAL) INSUFFICIENCY: ICD-10-CM

## 2023-01-10 PROCEDURE — 93970 EXTREMITY STUDY: CPT | Performed by: INTERNAL MEDICINE

## 2023-01-11 ENCOUNTER — TELEPHONE (OUTPATIENT)
Dept: CARDIOLOGY CLINIC | Age: 73
End: 2023-01-11

## 2023-01-11 NOTE — TELEPHONE ENCOUNTER
----- Message from FABIEN John CNP sent at 1/11/2023 11:06 AM EST -----    ----- Message -----  From: Rupert Krueger Incoming Cardiovascular Results From [facility]  Sent: 1/11/2023  10:15 AM EST  To: FABIEN John CNP    Summary        Bilateral GSV previously ablated from groin to knee without evidence of    flow. No evidence of significant venous insufficiency noted in the bilateral lower    extremities . No evidence of DVT or SVT in the bilateral common femoral vein, femoral    vein, popliteal vein, greater saphenous vein or small saphenous vein. Signature   Spoke with patient regarding results of lower extremity doppler patient voiced understanding.

## 2023-01-19 RX ORDER — CHOLECALCIFEROL (VITAMIN D3) 1250 MCG
CAPSULE ORAL
Qty: 12 CAPSULE | Refills: 0 | OUTPATIENT
Start: 2023-01-19

## 2023-01-19 NOTE — TELEPHONE ENCOUNTER
Patient was last seen in November 2022, patient is in need of a refill of her vitamin D.     Requested Prescriptions     Pending Prescriptions Disp Refills    Cholecalciferol (VITAMIN D3) 1.25 MG (87192 UT) CAPS [Pharmacy Med Name: Vitamin D3 1.25 MG (32437 UT) Oral Capsule] 12 capsule 0     Sig: Take 1 capsule by mouth once a week

## 2023-01-23 RX ORDER — METOPROLOL TARTRATE 50 MG/1
50 TABLET, FILM COATED ORAL 2 TIMES DAILY
Qty: 180 TABLET | Refills: 1 | Status: SHIPPED | OUTPATIENT
Start: 2023-01-23

## 2023-05-08 ENCOUNTER — TELEPHONE (OUTPATIENT)
Dept: CARDIOLOGY CLINIC | Age: 73
End: 2023-05-08

## 2023-05-08 NOTE — TELEPHONE ENCOUNTER
Faxed records release request to [facility] for [facility]. Faxed to [phone]. Call [phone]  Option 2 to check on progress. Chart to large to send from here.
